# Patient Record
Sex: FEMALE | Race: WHITE | Employment: UNEMPLOYED | URBAN - METROPOLITAN AREA
[De-identification: names, ages, dates, MRNs, and addresses within clinical notes are randomized per-mention and may not be internally consistent; named-entity substitution may affect disease eponyms.]

---

## 2018-05-24 ENCOUNTER — APPOINTMENT (OUTPATIENT)
Dept: ULTRASOUND IMAGING | Age: 48
End: 2018-05-24
Payer: COMMERCIAL

## 2018-05-24 ENCOUNTER — HOSPITAL ENCOUNTER (EMERGENCY)
Age: 48
Discharge: HOME | End: 2018-05-24
Attending: FAMILY MEDICINE
Payer: COMMERCIAL

## 2018-05-24 VITALS
OXYGEN SATURATION: 96 % | TEMPERATURE: 98.4 F | RESPIRATION RATE: 16 BRPM | DIASTOLIC BLOOD PRESSURE: 78 MMHG | SYSTOLIC BLOOD PRESSURE: 124 MMHG | WEIGHT: 144 LBS | BODY MASS INDEX: 28.27 KG/M2 | HEIGHT: 60 IN | HEART RATE: 94 BPM

## 2018-05-24 DIAGNOSIS — M71.21 BAKER'S CYST OF KNEE, RIGHT: Primary | ICD-10-CM

## 2018-05-24 PROCEDURE — 99283 EMERGENCY DEPT VISIT LOW MDM: CPT

## 2018-05-24 PROCEDURE — 76882 US LMTD JT/FCL EVL NVASC XTR: CPT

## 2018-08-07 ENCOUNTER — HOSPITAL ENCOUNTER (OUTPATIENT)
Age: 48
Discharge: HOME | End: 2018-08-09
Payer: COMMERCIAL

## 2018-08-07 ENCOUNTER — HOSPITAL ENCOUNTER (OUTPATIENT)
Dept: MRI IMAGING | Age: 48
Discharge: HOME | End: 2018-08-09
Payer: COMMERCIAL

## 2018-08-07 DIAGNOSIS — M25.561 PAIN AND SWELLING OF RIGHT KNEE: ICD-10-CM

## 2018-08-07 DIAGNOSIS — M25.461 PAIN AND SWELLING OF RIGHT KNEE: ICD-10-CM

## 2018-08-07 PROCEDURE — 73721 MRI JNT OF LWR EXTRE W/O DYE: CPT

## 2019-04-09 ENCOUNTER — HOSPITAL ENCOUNTER (OUTPATIENT)
Dept: MRI IMAGING | Age: 49
Discharge: HOME | End: 2019-04-11
Payer: COMMERCIAL

## 2019-04-09 DIAGNOSIS — M54.40 CHRONIC LOW BACK PAIN WITH SCIATICA, SCIATICA LATERALITY UNSPECIFIED, UNSPECIFIED BACK PAIN LATERALITY: ICD-10-CM

## 2019-04-09 DIAGNOSIS — G89.29 CHRONIC LOW BACK PAIN WITH SCIATICA, SCIATICA LATERALITY UNSPECIFIED, UNSPECIFIED BACK PAIN LATERALITY: ICD-10-CM

## 2019-04-09 PROCEDURE — 72148 MRI LUMBAR SPINE W/O DYE: CPT

## 2019-05-04 ENCOUNTER — APPOINTMENT (OUTPATIENT)
Dept: GENERAL RADIOLOGY | Age: 49
End: 2019-05-04
Payer: COMMERCIAL

## 2019-05-04 ENCOUNTER — HOSPITAL ENCOUNTER (EMERGENCY)
Age: 49
Discharge: HOME | End: 2019-05-04
Attending: FAMILY MEDICINE
Payer: COMMERCIAL

## 2019-05-04 VITALS
SYSTOLIC BLOOD PRESSURE: 137 MMHG | DIASTOLIC BLOOD PRESSURE: 80 MMHG | RESPIRATION RATE: 20 BRPM | WEIGHT: 142 LBS | OXYGEN SATURATION: 94 % | HEIGHT: 60 IN | HEART RATE: 93 BPM | BODY MASS INDEX: 27.88 KG/M2 | TEMPERATURE: 98.6 F

## 2019-05-04 DIAGNOSIS — J44.1 COPD EXACERBATION (HCC): Primary | ICD-10-CM

## 2019-05-04 PROCEDURE — 71045 X-RAY EXAM CHEST 1 VIEW: CPT

## 2019-05-04 PROCEDURE — 6370000000 HC RX 637 (ALT 250 FOR IP): Performed by: FAMILY MEDICINE

## 2019-05-04 PROCEDURE — 99284 EMERGENCY DEPT VISIT MOD MDM: CPT

## 2019-05-04 RX ORDER — PREDNISONE 20 MG/1
TABLET ORAL
Qty: 10 TABLET | Refills: 0 | Status: SHIPPED | OUTPATIENT
Start: 2019-05-04 | End: 2019-05-14

## 2019-05-04 RX ORDER — PREDNISONE 20 MG/1
60 TABLET ORAL ONCE
Status: COMPLETED | OUTPATIENT
Start: 2019-05-04 | End: 2019-05-04

## 2019-05-04 RX ORDER — IPRATROPIUM BROMIDE AND ALBUTEROL SULFATE 2.5; .5 MG/3ML; MG/3ML
1 SOLUTION RESPIRATORY (INHALATION) ONCE
Status: COMPLETED | OUTPATIENT
Start: 2019-05-04 | End: 2019-05-04

## 2019-05-04 RX ORDER — AZITHROMYCIN 250 MG/1
TABLET, FILM COATED ORAL
Qty: 1 PACKET | Refills: 0 | Status: SHIPPED | OUTPATIENT
Start: 2019-05-04 | End: 2019-12-26

## 2019-05-04 RX ADMIN — IPRATROPIUM BROMIDE AND ALBUTEROL SULFATE 1 AMPULE: .5; 3 SOLUTION RESPIRATORY (INHALATION) at 09:22

## 2019-05-04 RX ADMIN — PREDNISONE 60 MG: 20 TABLET ORAL at 09:22

## 2019-05-04 RX ADMIN — IPRATROPIUM BROMIDE AND ALBUTEROL SULFATE 1 AMPULE: .5; 3 SOLUTION RESPIRATORY (INHALATION) at 09:45

## 2019-05-04 NOTE — ED NOTES
Patient states \"I already took 10 mg prednisone this morning, is it okay if I take 60 mg now? \" Dr Lonnie Horowitz notified and ok for patient to take 60 mg     Jasper Lozano RN  05/04/19 1015

## 2019-05-04 NOTE — ED NOTES
Patient's pulse ox 94%- ok with Dr Dina Steiner for patient to be discharged      Dk Gresham RN  05/04/19 1929

## 2019-05-04 NOTE — ED NOTES
Patient given discharge paperwork at this time. Patient also given scripts. Patient has no further questions at this time. Nurse provided education to patient. Patient is alert and oriented and VS are stable at this time.         Link TESSIE Dorantes  05/04/19 4450

## 2019-05-04 NOTE — ED PROVIDER NOTES
Department of Emergency Medicine   ED  Provider Note  Admit Date/RoomTime: 5/4/2019  9:10 AM  ED Room: 05/05  Chief Complaint:   Shortness of Breath (pt presents with SOB for the past week- patient has a history of COPD- has been taking breathing treatments and inhalers at home, also started on prednisone 6 days ago- patient states \"this morning i coughed up a lot of green phlegm\")    History of Present Illness   Source of history provided by:  patient. History/Exam Limitations: none. Clifford Delgadillo is a 51 y.o. old female who  has a past medical history of Anxiety, Asthma, Bipolar disorder ([facility] [address] Chronic back pain, COPD, Fibrocystic breast changes, GERD (gastroesophageal reflux disease), Herpes zoster ophthalmicus, Irritable bowel syndrome, Obesity, Psoriasis, Psoriatic arthropathy ([address] and Tinea versicolor. presents to the emergency department by private vehicle and ambulatory, with complaints of gradual onset, still present shortness of breath which began 1 week(s) prior to arrival.  She has PMH significant for COPD. The symptoms are associated with productive green sputum today and denies any symptoms of: fever, chills, night sweats, nasal congestion, chest pain or hemoptysis. Since onset the symptoms have been worsening. The symptoms are aggravated by exertion and relieved by rest and sitting up. ROS    Pertinent positives and negatives are stated within HPI, all other systems reviewed and are negative. Past Surgical History:   Procedure Laterality Date    CHOLECYSTECTOMY  11/2003    HERNIA REPAIR  11/2003    LAPAROSCOPY      LEEP      LYMPH NODE BIOPSY     Social History:  reports that she has been smoking cigarettes. She has a 13.00 pack-year smoking history. She has never used smokeless tobacco. She reports that she does not drink alcohol or use drugs.   Family History: family history includes Arthritis in her father and sister; Cancer (age of onset: 40) in her mother; Diabetes in ipratropium-albuterol (DUONEB) nebulizer solution 1 ampule (1 ampule Inhalation Given 5/4/19 0945)        Re-examination:  5/4/19       Time: 10:15   Improving feels better     Consult(s):   None    Procedure(s):   none    MDM:   Patient has significant COPD however she is not hypoxic at this point she is not tachypneic no retractions feels better after treatment does have nebulizer at home will start on Zithromax increase prednisone advise follow-up with PMD return when necessary or to the hospital if symptoms worsen. Counseling: The emergency provider has spoken with the patient and discussed todays results, in addition to providing specific details for the plan of care and counseling regarding the diagnosis and prognosis. Questions are answered at this time and they are agreeable with the plan. Assessment      1. COPD exacerbation ([address]      Plan   Discharge to home  Patient condition is good    New Medications     New Prescriptions    AZITHROMYCIN (ZITHROMAX Z-RIRI) 250 MG TABLET    TAKE 500MG PO DAY ONE. .. 250MG PO DAY TWO THROUGH FIVE   DISPENSE 6 TABS  NO REFILLS    PREDNISONE (DELTASONE) 20 MG TABLET    Start Sunday AM Two tablets qd     Electronically signed by Bill Mott MD   DD: 5/4/19  **This report was transcribed using voice recognition software. Every effort was made to ensure accuracy; however, inadvertent computerized transcription errors may be present.   END OF ED PROVIDER NOTE        Bill Mott MD  05/04/19 0337

## 2019-08-22 ENCOUNTER — HOSPITAL ENCOUNTER (OUTPATIENT)
Dept: GENERAL RADIOLOGY | Age: 49
Discharge: HOME | End: 2019-08-24
Payer: COMMERCIAL

## 2019-08-22 ENCOUNTER — HOSPITAL ENCOUNTER (OUTPATIENT)
Age: 49
Discharge: HOME | End: 2019-08-24
Payer: COMMERCIAL

## 2019-08-22 DIAGNOSIS — R52 PAIN: ICD-10-CM

## 2019-08-22 PROCEDURE — 73564 X-RAY EXAM KNEE 4 OR MORE: CPT

## 2019-12-26 ENCOUNTER — APPOINTMENT (OUTPATIENT)
Dept: GENERAL RADIOLOGY | Age: 49
DRG: 190 | End: 2019-12-26
Attending: INTERNAL MEDICINE
Payer: COMMERCIAL

## 2019-12-26 ENCOUNTER — HOSPITAL ENCOUNTER (EMERGENCY)
Age: 49
Discharge: ANOTHER HOSPITAL | End: 2019-12-26
Attending: FAMILY MEDICINE
Payer: COMMERCIAL

## 2019-12-26 ENCOUNTER — APPOINTMENT (OUTPATIENT)
Dept: GENERAL RADIOLOGY | Age: 49
End: 2019-12-26
Payer: COMMERCIAL

## 2019-12-26 ENCOUNTER — HOSPITAL ENCOUNTER (INPATIENT)
Age: 49
LOS: 4 days | Discharge: HOME | DRG: 190 | End: 2019-12-31
Attending: INTERNAL MEDICINE | Admitting: INTERNAL MEDICINE
Payer: COMMERCIAL

## 2019-12-26 VITALS
DIASTOLIC BLOOD PRESSURE: 76 MMHG | WEIGHT: 140 LBS | OXYGEN SATURATION: 96 % | TEMPERATURE: 98.6 F | RESPIRATION RATE: 26 BRPM | SYSTOLIC BLOOD PRESSURE: 115 MMHG | HEIGHT: 60 IN | HEART RATE: 118 BPM | BODY MASS INDEX: 27.48 KG/M2

## 2019-12-26 DIAGNOSIS — J96.01 ACUTE RESPIRATORY FAILURE WITH HYPOXIA (HCC): Primary | ICD-10-CM

## 2019-12-26 PROBLEM — J44.1 COPD EXACERBATION (HCC): Status: ACTIVE | Noted: 2019-12-26

## 2019-12-26 LAB
ALBUMIN SERPL-MCNC: 4.1 G/DL (ref 3.5–5.2)
ALP BLD-CCNC: 64 U/L (ref 35–104)
ALT SERPL-CCNC: 17 U/L (ref 0–32)
ANION GAP SERPL CALCULATED.3IONS-SCNC: 16 MMOL/L (ref 7–16)
ARTERIAL PH AT TEMPERATURE: 7.42 (ref 7.35–7.45)
AST SERPL-CCNC: 18 U/L (ref 0–31)
BASE EXCESS: -0.7 MMOL/L (ref -3–3)
BASOPHILS ABSOLUTE: 0.03 E9/L (ref 0–0.2)
BASOPHILS RELATIVE PERCENT: 0.2 % (ref 0–2)
BILIRUB SERPL-MCNC: 0.4 MG/DL (ref 0–1.2)
BUN BLDV-MCNC: 11 MG/DL (ref 6–20)
CALCIUM SERPL-MCNC: 9.4 MG/DL (ref 8.6–10.2)
CHLORIDE BLD-SCNC: 94 MMOL/L (ref 98–107)
CO2: 25 MMOL/L (ref 22–29)
CREAT SERPL-MCNC: 0.6 MG/DL (ref 0.5–1)
D DIMER: 322 NG/ML DDU
DRAWN BY: NORMAL
EOSINOPHILS ABSOLUTE: 0 E9/L (ref 0.05–0.5)
EOSINOPHILS RELATIVE PERCENT: 0 % (ref 0–6)
GFR AFRICAN AMERICAN: >60
GFR NON-AFRICAN AMERICAN: >60 ML/MIN/1.73
GLUCOSE BLD-MCNC: 138 MG/DL (ref 74–99)
HCO3: 23.5 MMOL/L (ref 22–26)
HCT VFR BLD CALC: 41.4 % (ref 34–48)
HEMOGLOBIN: 13.6 G/DL (ref 11.5–15.5)
IMMATURE GRANULOCYTES #: 0.04 E9/L
IMMATURE GRANULOCYTES %: 0.3 % (ref 0–5)
INFLUENZA A BY PCR: NOT DETECTED
INFLUENZA B BY PCR: NOT DETECTED
INSPIRED O2: 2 %
LYMPHOCYTES ABSOLUTE: 1.05 E9/L (ref 1.5–4)
LYMPHOCYTES RELATIVE PERCENT: 8.4 % (ref 20–42)
MCH RBC QN AUTO: 31.7 PG (ref 26–35)
MCHC RBC AUTO-ENTMCNC: 32.9 % (ref 32–34.5)
MCV RBC AUTO: 96.5 FL (ref 80–99.9)
MODE: NORMAL
MONOCYTES ABSOLUTE: 0.78 E9/L (ref 0.1–0.95)
MONOCYTES RELATIVE PERCENT: 6.2 % (ref 2–12)
NEUTROPHILS ABSOLUTE: 10.66 E9/L (ref 1.8–7.3)
NEUTROPHILS RELATIVE PERCENT: 84.9 % (ref 43–80)
O2 SATURATION: 95.7 % (ref 95–100)
PCO2: 36.5 MMHG (ref 35–45)
PDW BLD-RTO: 13.1 FL (ref 11.5–15)
PLATELET # BLD: 215 E9/L (ref 130–450)
PMV BLD AUTO: 9.3 FL (ref 7–12)
PO2: 78.1 MMHG (ref 60–80)
POTASSIUM SERPL-SCNC: 3.9 MMOL/L (ref 3.5–5)
RBC # BLD: 4.29 E12/L (ref 3.5–5.5)
SAMPLE SITE: NORMAL
SODIUM BLD-SCNC: 135 MMOL/L (ref 132–146)
SPECIMEN SOURCE: NORMAL
TOTAL PROTEIN: 7.7 G/DL (ref 6.4–8.3)
WBC # BLD: 12.6 E9/L (ref 4.5–11.5)

## 2019-12-26 PROCEDURE — 99285 EMERGENCY DEPT VISIT HI MDM: CPT

## 2019-12-26 PROCEDURE — 0100U HC RESPIRPTHGN MULT REV TRANS & AMP PRB TECH 21 TRGT: CPT

## 2019-12-26 PROCEDURE — 2700000000 HC OXYGEN THERAPY PER DAY

## 2019-12-26 PROCEDURE — 93005 ELECTROCARDIOGRAM TRACING: CPT | Performed by: FAMILY MEDICINE

## 2019-12-26 PROCEDURE — G0378 HOSPITAL OBSERVATION PER HR: HCPCS

## 2019-12-26 PROCEDURE — 2580000003 HC RX 258: Performed by: INTERNAL MEDICINE

## 2019-12-26 PROCEDURE — 6370000000 HC RX 637 (ALT 250 FOR IP)

## 2019-12-26 PROCEDURE — 71045 X-RAY EXAM CHEST 1 VIEW: CPT

## 2019-12-26 PROCEDURE — 84145 PROCALCITONIN (PCT): CPT

## 2019-12-26 PROCEDURE — 85025 COMPLETE CBC W/AUTO DIFF WBC: CPT

## 2019-12-26 PROCEDURE — 87040 BLOOD CULTURE FOR BACTERIA: CPT

## 2019-12-26 PROCEDURE — 96374 THER/PROPH/DIAG INJ IV PUSH: CPT

## 2019-12-26 PROCEDURE — 99221 1ST HOSP IP/OBS SF/LOW 40: CPT | Performed by: INTERNAL MEDICINE

## 2019-12-26 PROCEDURE — 86140 C-REACTIVE PROTEIN: CPT

## 2019-12-26 PROCEDURE — 6360000002 HC RX W HCPCS: Performed by: FAMILY MEDICINE

## 2019-12-26 PROCEDURE — 87502 INFLUENZA DNA AMP PROBE: CPT

## 2019-12-26 PROCEDURE — G0379 DIRECT REFER HOSPITAL OBSERV: HCPCS

## 2019-12-26 PROCEDURE — 36415 COLL VENOUS BLD VENIPUNCTURE: CPT

## 2019-12-26 PROCEDURE — 6360000002 HC RX W HCPCS

## 2019-12-26 PROCEDURE — 6360000002 HC RX W HCPCS: Performed by: INTERNAL MEDICINE

## 2019-12-26 PROCEDURE — 80053 COMPREHEN METABOLIC PANEL: CPT

## 2019-12-26 PROCEDURE — 6370000000 HC RX 637 (ALT 250 FOR IP): Performed by: INTERNAL MEDICINE

## 2019-12-26 PROCEDURE — 94640 AIRWAY INHALATION TREATMENT: CPT

## 2019-12-26 PROCEDURE — 84484 ASSAY OF TROPONIN QUANT: CPT

## 2019-12-26 PROCEDURE — 2580000003 HC RX 258: Performed by: FAMILY MEDICINE

## 2019-12-26 PROCEDURE — 83605 ASSAY OF LACTIC ACID: CPT

## 2019-12-26 PROCEDURE — 82803 BLOOD GASES ANY COMBINATION: CPT

## 2019-12-26 PROCEDURE — 85378 FIBRIN DEGRADE SEMIQUANT: CPT

## 2019-12-26 RX ORDER — IPRATROPIUM BROMIDE AND ALBUTEROL SULFATE 2.5; .5 MG/3ML; MG/3ML
1 SOLUTION RESPIRATORY (INHALATION)
Status: DISCONTINUED | OUTPATIENT
Start: 2019-12-27 | End: 2020-01-01 | Stop reason: HOSPADM

## 2019-12-26 RX ORDER — MAGNESIUM SULFATE IN WATER 40 MG/ML
INJECTION, SOLUTION INTRAVENOUS
Status: COMPLETED
Start: 2019-12-26 | End: 2019-12-26

## 2019-12-26 RX ORDER — ONDANSETRON 2 MG/ML
4 INJECTION INTRAMUSCULAR; INTRAVENOUS EVERY 6 HOURS PRN
Status: DISCONTINUED | OUTPATIENT
Start: 2019-12-26 | End: 2020-01-01 | Stop reason: HOSPADM

## 2019-12-26 RX ORDER — METHYLPREDNISOLONE SODIUM SUCCINATE 40 MG/ML
40 INJECTION, POWDER, LYOPHILIZED, FOR SOLUTION INTRAMUSCULAR; INTRAVENOUS EVERY 8 HOURS
Status: DISCONTINUED | OUTPATIENT
Start: 2019-12-26 | End: 2019-12-30

## 2019-12-26 RX ORDER — POTASSIUM CHLORIDE 20 MEQ/1
40 TABLET, EXTENDED RELEASE ORAL PRN
Status: DISCONTINUED | OUTPATIENT
Start: 2019-12-26 | End: 2020-01-01 | Stop reason: HOSPADM

## 2019-12-26 RX ORDER — SODIUM CHLORIDE 0.9 % (FLUSH) 0.9 %
10 SYRINGE (ML) INJECTION EVERY 12 HOURS SCHEDULED
Status: DISCONTINUED | OUTPATIENT
Start: 2019-12-26 | End: 2020-01-01 | Stop reason: HOSPADM

## 2019-12-26 RX ORDER — MAGNESIUM SULFATE 1 G/100ML
1 INJECTION INTRAVENOUS PRN
Status: DISCONTINUED | OUTPATIENT
Start: 2019-12-26 | End: 2020-01-01 | Stop reason: HOSPADM

## 2019-12-26 RX ORDER — AZITHROMYCIN 250 MG/1
250 TABLET, FILM COATED ORAL DAILY
Status: COMPLETED | OUTPATIENT
Start: 2019-12-28 | End: 2019-12-31

## 2019-12-26 RX ORDER — METHYLPREDNISOLONE SODIUM SUCCINATE 125 MG/2ML
125 INJECTION, POWDER, LYOPHILIZED, FOR SOLUTION INTRAMUSCULAR; INTRAVENOUS ONCE
Status: COMPLETED | OUTPATIENT
Start: 2019-12-26 | End: 2019-12-26

## 2019-12-26 RX ORDER — GUAIFENESIN AND DEXTROMETHORPHAN HYDROBROMIDE 1200; 60 MG/1; MG/1
TABLET, EXTENDED RELEASE ORAL
COMMUNITY
End: 2021-04-21

## 2019-12-26 RX ORDER — POTASSIUM CHLORIDE 7.45 MG/ML
10 INJECTION INTRAVENOUS PRN
Status: DISCONTINUED | OUTPATIENT
Start: 2019-12-26 | End: 2020-01-01 | Stop reason: HOSPADM

## 2019-12-26 RX ORDER — SODIUM CHLORIDE 0.9 % (FLUSH) 0.9 %
10 SYRINGE (ML) INJECTION PRN
Status: DISCONTINUED | OUTPATIENT
Start: 2019-12-26 | End: 2020-01-01 | Stop reason: HOSPADM

## 2019-12-26 RX ORDER — IPRATROPIUM BROMIDE AND ALBUTEROL SULFATE 2.5; .5 MG/3ML; MG/3ML
SOLUTION RESPIRATORY (INHALATION)
Status: COMPLETED
Start: 2019-12-26 | End: 2019-12-26

## 2019-12-26 RX ORDER — AZITHROMYCIN 250 MG/1
500 TABLET, FILM COATED ORAL DAILY
Status: COMPLETED | OUTPATIENT
Start: 2019-12-27 | End: 2019-12-27

## 2019-12-26 RX ORDER — 0.9 % SODIUM CHLORIDE 0.9 %
1000 INTRAVENOUS SOLUTION INTRAVENOUS ONCE
Status: COMPLETED | OUTPATIENT
Start: 2019-12-26 | End: 2019-12-26

## 2019-12-26 RX ORDER — IPRATROPIUM BROMIDE AND ALBUTEROL SULFATE 2.5; .5 MG/3ML; MG/3ML
1 SOLUTION RESPIRATORY (INHALATION) ONCE
Status: DISCONTINUED | OUTPATIENT
Start: 2019-12-26 | End: 2019-12-26

## 2019-12-26 RX ORDER — ACETAMINOPHEN 325 MG/1
650 TABLET ORAL EVERY 4 HOURS PRN
Status: DISCONTINUED | OUTPATIENT
Start: 2019-12-26 | End: 2020-01-01 | Stop reason: HOSPADM

## 2019-12-26 RX ORDER — MAGNESIUM SULFATE HEPTAHYDRATE 500 MG/ML
2 INJECTION, SOLUTION INTRAMUSCULAR; INTRAVENOUS ONCE
Status: DISCONTINUED | OUTPATIENT
Start: 2019-12-26 | End: 2019-12-26 | Stop reason: HOSPADM

## 2019-12-26 RX ADMIN — SODIUM CHLORIDE, PRESERVATIVE FREE 10 ML: 5 INJECTION INTRAVENOUS at 23:07

## 2019-12-26 RX ADMIN — IPRATROPIUM BROMIDE AND ALBUTEROL SULFATE 3 ML: 2.5; .5 SOLUTION RESPIRATORY (INHALATION) at 16:10

## 2019-12-26 RX ADMIN — SODIUM CHLORIDE 1000 ML: 9 INJECTION, SOLUTION INTRAVENOUS at 16:58

## 2019-12-26 RX ADMIN — MOMETASONE FUROATE AND FORMOTEROL FUMARATE DIHYDRATE 2 PUFF: 200; 5 AEROSOL RESPIRATORY (INHALATION) at 22:44

## 2019-12-26 RX ADMIN — METHYLPREDNISOLONE SODIUM SUCCINATE 125 MG: 125 INJECTION, POWDER, FOR SOLUTION INTRAMUSCULAR; INTRAVENOUS at 16:55

## 2019-12-26 RX ADMIN — MAGNESIUM SULFATE HEPTAHYDRATE 2 G: 40 INJECTION, SOLUTION INTRAVENOUS at 16:58

## 2019-12-26 RX ADMIN — METHYLPREDNISOLONE SODIUM SUCCINATE 40 MG: 40 INJECTION, POWDER, LYOPHILIZED, FOR SOLUTION INTRAMUSCULAR; INTRAVENOUS at 23:07

## 2019-12-26 ASSESSMENT — PAIN SCALES - GENERAL: PAINLEVEL_OUTOF10: 4

## 2019-12-26 ASSESSMENT — PAIN DESCRIPTION - ORIENTATION: ORIENTATION: LOWER

## 2019-12-26 ASSESSMENT — PAIN DESCRIPTION - FREQUENCY: FREQUENCY: INTERMITTENT

## 2019-12-26 ASSESSMENT — PAIN DESCRIPTION - LOCATION: LOCATION: BACK

## 2019-12-26 ASSESSMENT — PAIN DESCRIPTION - PROGRESSION: CLINICAL_PROGRESSION: NOT CHANGED

## 2019-12-26 ASSESSMENT — PAIN DESCRIPTION - ONSET: ONSET: ON-GOING

## 2019-12-26 ASSESSMENT — PAIN - FUNCTIONAL ASSESSMENT: PAIN_FUNCTIONAL_ASSESSMENT: ACTIVITIES ARE NOT PREVENTED

## 2019-12-26 ASSESSMENT — PAIN DESCRIPTION - PAIN TYPE: TYPE: CHRONIC PAIN

## 2019-12-26 ASSESSMENT — PAIN DESCRIPTION - DESCRIPTORS: DESCRIPTORS: ACHING

## 2019-12-27 LAB
ADENOVIRUS BY PCR: NOT DETECTED
ALBUMIN SERPL-MCNC: 4.1 G/DL (ref 3.5–5.2)
ALBUMIN SERPL-MCNC: 4.2 G/DL (ref 3.5–5.2)
ALP BLD-CCNC: 61 U/L (ref 35–104)
ALP BLD-CCNC: 61 U/L (ref 35–104)
ALT SERPL-CCNC: 18 U/L (ref 0–32)
ALT SERPL-CCNC: 18 U/L (ref 0–32)
ANION GAP SERPL CALCULATED.3IONS-SCNC: 11 MMOL/L (ref 7–16)
ANION GAP SERPL CALCULATED.3IONS-SCNC: 14 MMOL/L (ref 7–16)
AST SERPL-CCNC: 19 U/L (ref 0–31)
AST SERPL-CCNC: 22 U/L (ref 0–31)
BASOPHILS ABSOLUTE: 0.01 E9/L (ref 0–0.2)
BASOPHILS ABSOLUTE: 0.02 E9/L (ref 0–0.2)
BASOPHILS RELATIVE PERCENT: 0.1 % (ref 0–2)
BASOPHILS RELATIVE PERCENT: 0.2 % (ref 0–2)
BILIRUB SERPL-MCNC: 0.3 MG/DL (ref 0–1.2)
BILIRUB SERPL-MCNC: 0.3 MG/DL (ref 0–1.2)
BILIRUBIN DIRECT: <0.2 MG/DL (ref 0–0.3)
BILIRUBIN, INDIRECT: NORMAL MG/DL (ref 0–1)
BORDETELLA PARAPERTUSSIS BY PCR: NOT DETECTED
BORDETELLA PERTUSSIS BY PCR: NOT DETECTED
BUN BLDV-MCNC: 11 MG/DL (ref 6–20)
BUN BLDV-MCNC: 9 MG/DL (ref 6–20)
C-REACTIVE PROTEIN: 12.1 MG/DL (ref 0–0.4)
CALCIUM SERPL-MCNC: 8.9 MG/DL (ref 8.6–10.2)
CALCIUM SERPL-MCNC: 9.5 MG/DL (ref 8.6–10.2)
CHLAMYDOPHILIA PNEUMONIAE BY PCR: NOT DETECTED
CHLORIDE BLD-SCNC: 101 MMOL/L (ref 98–107)
CHLORIDE BLD-SCNC: 98 MMOL/L (ref 98–107)
CO2: 23 MMOL/L (ref 22–29)
CO2: 26 MMOL/L (ref 22–29)
CORONAVIRUS 229E BY PCR: NOT DETECTED
CORONAVIRUS HKU1 BY PCR: NOT DETECTED
CORONAVIRUS NL63 BY PCR: NOT DETECTED
CORONAVIRUS OC43 BY PCR: NOT DETECTED
CREAT SERPL-MCNC: 0.4 MG/DL (ref 0.5–1)
CREAT SERPL-MCNC: 0.5 MG/DL (ref 0.5–1)
EKG ATRIAL RATE: 139 BPM
EKG P AXIS: 77 DEGREES
EKG P-R INTERVAL: 126 MS
EKG Q-T INTERVAL: 288 MS
EKG QRS DURATION: 66 MS
EKG QTC CALCULATION (BAZETT): 438 MS
EKG R AXIS: 26 DEGREES
EKG T AXIS: 62 DEGREES
EKG VENTRICULAR RATE: 139 BPM
EOSINOPHILS ABSOLUTE: 0 E9/L (ref 0.05–0.5)
EOSINOPHILS ABSOLUTE: 0 E9/L (ref 0.05–0.5)
EOSINOPHILS RELATIVE PERCENT: 0 % (ref 0–6)
EOSINOPHILS RELATIVE PERCENT: 0 % (ref 0–6)
GFR AFRICAN AMERICAN: >60
GFR AFRICAN AMERICAN: >60
GFR NON-AFRICAN AMERICAN: >60 ML/MIN/1.73
GFR NON-AFRICAN AMERICAN: >60 ML/MIN/1.73
GLUCOSE BLD-MCNC: 137 MG/DL (ref 74–99)
GLUCOSE BLD-MCNC: 161 MG/DL (ref 74–99)
HCT VFR BLD CALC: 40 % (ref 34–48)
HCT VFR BLD CALC: 40.3 % (ref 34–48)
HEMOGLOBIN: 13.2 G/DL (ref 11.5–15.5)
HEMOGLOBIN: 13.4 G/DL (ref 11.5–15.5)
HUMAN METAPNEUMOVIRUS BY PCR: NOT DETECTED
HUMAN RHINOVIRUS/ENTEROVIRUS BY PCR: NOT DETECTED
IMMATURE GRANULOCYTES #: 0.03 E9/L
IMMATURE GRANULOCYTES #: 0.05 E9/L
IMMATURE GRANULOCYTES %: 0.3 % (ref 0–5)
IMMATURE GRANULOCYTES %: 0.5 % (ref 0–5)
INFLUENZA A BY PCR: NOT DETECTED
INFLUENZA B BY PCR: NOT DETECTED
LACTIC ACID: 1 MMOL/L (ref 0.5–2.2)
LYMPHOCYTES ABSOLUTE: 0.32 E9/L (ref 1.5–4)
LYMPHOCYTES ABSOLUTE: 0.42 E9/L (ref 1.5–4)
LYMPHOCYTES RELATIVE PERCENT: 3 % (ref 20–42)
LYMPHOCYTES RELATIVE PERCENT: 4.6 % (ref 20–42)
MAGNESIUM: 2.3 MG/DL (ref 1.6–2.6)
MCH RBC QN AUTO: 31.2 PG (ref 26–35)
MCH RBC QN AUTO: 31.6 PG (ref 26–35)
MCHC RBC AUTO-ENTMCNC: 32.8 % (ref 32–34.5)
MCHC RBC AUTO-ENTMCNC: 33.5 % (ref 32–34.5)
MCV RBC AUTO: 94.3 FL (ref 80–99.9)
MCV RBC AUTO: 95.3 FL (ref 80–99.9)
MONOCYTES ABSOLUTE: 0.08 E9/L (ref 0.1–0.95)
MONOCYTES ABSOLUTE: 0.12 E9/L (ref 0.1–0.95)
MONOCYTES RELATIVE PERCENT: 0.8 % (ref 2–12)
MONOCYTES RELATIVE PERCENT: 1.3 % (ref 2–12)
MYCOPLASMA PNEUMONIAE BY PCR: NOT DETECTED
NEUTROPHILS ABSOLUTE: 10.13 E9/L (ref 1.8–7.3)
NEUTROPHILS ABSOLUTE: 8.59 E9/L (ref 1.8–7.3)
NEUTROPHILS RELATIVE PERCENT: 93.5 % (ref 43–80)
NEUTROPHILS RELATIVE PERCENT: 95.7 % (ref 43–80)
PARAINFLUENZA VIRUS 1 BY PCR: NOT DETECTED
PARAINFLUENZA VIRUS 2 BY PCR: NOT DETECTED
PARAINFLUENZA VIRUS 3 BY PCR: NOT DETECTED
PARAINFLUENZA VIRUS 4 BY PCR: NOT DETECTED
PDW BLD-RTO: 13.2 FL (ref 11.5–15)
PDW BLD-RTO: 13.2 FL (ref 11.5–15)
PLATELET # BLD: 218 E9/L (ref 130–450)
PLATELET # BLD: 229 E9/L (ref 130–450)
PMV BLD AUTO: 9.4 FL (ref 7–12)
PMV BLD AUTO: 9.7 FL (ref 7–12)
POIKILOCYTES: ABNORMAL
POLYCHROMASIA: ABNORMAL
POTASSIUM REFLEX MAGNESIUM: 4.2 MMOL/L (ref 3.5–5)
POTASSIUM SERPL-SCNC: 4 MMOL/L (ref 3.5–5)
PROCALCITONIN: 0.07 NG/ML (ref 0–0.08)
RBC # BLD: 4.23 E12/L (ref 3.5–5.5)
RBC # BLD: 4.24 E12/L (ref 3.5–5.5)
RESPIRATORY SYNCYTIAL VIRUS BY PCR: DETECTED
SODIUM BLD-SCNC: 135 MMOL/L (ref 132–146)
SODIUM BLD-SCNC: 138 MMOL/L (ref 132–146)
TEAR DROP CELLS: ABNORMAL
TOTAL PROTEIN: 7.6 G/DL (ref 6.4–8.3)
TOTAL PROTEIN: 7.6 G/DL (ref 6.4–8.3)
TROPONIN: <0.01 NG/ML (ref 0–0.03)
WBC # BLD: 10.6 E9/L (ref 4.5–11.5)
WBC # BLD: 9.2 E9/L (ref 4.5–11.5)

## 2019-12-27 PROCEDURE — 96372 THER/PROPH/DIAG INJ SC/IM: CPT

## 2019-12-27 PROCEDURE — 2700000000 HC OXYGEN THERAPY PER DAY

## 2019-12-27 PROCEDURE — 85025 COMPLETE CBC W/AUTO DIFF WBC: CPT

## 2019-12-27 PROCEDURE — 1200000000 HC SEMI PRIVATE

## 2019-12-27 PROCEDURE — 2580000003 HC RX 258: Performed by: INTERNAL MEDICINE

## 2019-12-27 PROCEDURE — 82103 ALPHA-1-ANTITRYPSIN TOTAL: CPT

## 2019-12-27 PROCEDURE — 80076 HEPATIC FUNCTION PANEL: CPT

## 2019-12-27 PROCEDURE — 36415 COLL VENOUS BLD VENIPUNCTURE: CPT

## 2019-12-27 PROCEDURE — 6370000000 HC RX 637 (ALT 250 FOR IP): Performed by: INTERNAL MEDICINE

## 2019-12-27 PROCEDURE — 83735 ASSAY OF MAGNESIUM: CPT

## 2019-12-27 PROCEDURE — 87206 SMEAR FLUORESCENT/ACID STAI: CPT

## 2019-12-27 PROCEDURE — 94640 AIRWAY INHALATION TREATMENT: CPT

## 2019-12-27 PROCEDURE — 82785 ASSAY OF IGE: CPT

## 2019-12-27 PROCEDURE — 96376 TX/PRO/DX INJ SAME DRUG ADON: CPT

## 2019-12-27 PROCEDURE — 87070 CULTURE OTHR SPECIMN AEROBIC: CPT

## 2019-12-27 PROCEDURE — 93010 ELECTROCARDIOGRAM REPORT: CPT | Performed by: INTERNAL MEDICINE

## 2019-12-27 PROCEDURE — 82104 ALPHA-1-ANTITRYPSIN PHENO: CPT

## 2019-12-27 PROCEDURE — 6360000002 HC RX W HCPCS: Performed by: INTERNAL MEDICINE

## 2019-12-27 PROCEDURE — 80048 BASIC METABOLIC PNL TOTAL CA: CPT

## 2019-12-27 RX ORDER — METOPROLOL SUCCINATE 25 MG/1
12.5 TABLET, EXTENDED RELEASE ORAL 2 TIMES DAILY
Status: DISCONTINUED | OUTPATIENT
Start: 2019-12-27 | End: 2020-01-01 | Stop reason: HOSPADM

## 2019-12-27 RX ORDER — PANTOPRAZOLE SODIUM 40 MG/1
40 TABLET, DELAYED RELEASE ORAL
Status: DISCONTINUED | OUTPATIENT
Start: 2019-12-27 | End: 2020-01-01 | Stop reason: HOSPADM

## 2019-12-27 RX ORDER — GUAIFENESIN/DEXTROMETHORPHAN 100-10MG/5
10 SYRUP ORAL EVERY 4 HOURS PRN
Status: DISCONTINUED | OUTPATIENT
Start: 2019-12-27 | End: 2020-01-01 | Stop reason: HOSPADM

## 2019-12-27 RX ADMIN — METHYLPREDNISOLONE SODIUM SUCCINATE 40 MG: 40 INJECTION, POWDER, LYOPHILIZED, FOR SOLUTION INTRAMUSCULAR; INTRAVENOUS at 14:16

## 2019-12-27 RX ADMIN — MOMETASONE FUROATE AND FORMOTEROL FUMARATE DIHYDRATE 2 PUFF: 200; 5 AEROSOL RESPIRATORY (INHALATION) at 07:52

## 2019-12-27 RX ADMIN — METOPROLOL SUCCINATE 12.5 MG: 25 TABLET, EXTENDED RELEASE ORAL at 08:29

## 2019-12-27 RX ADMIN — MOMETASONE FUROATE AND FORMOTEROL FUMARATE DIHYDRATE 2 PUFF: 200; 5 AEROSOL RESPIRATORY (INHALATION) at 20:40

## 2019-12-27 RX ADMIN — GUAIFENESIN AND DEXTROMETHORPHAN 10 ML: 100; 10 SYRUP ORAL at 08:28

## 2019-12-27 RX ADMIN — METOPROLOL SUCCINATE 12.5 MG: 25 TABLET, EXTENDED RELEASE ORAL at 01:38

## 2019-12-27 RX ADMIN — METOPROLOL SUCCINATE 12.5 MG: 25 TABLET, EXTENDED RELEASE ORAL at 22:46

## 2019-12-27 RX ADMIN — METHYLPREDNISOLONE SODIUM SUCCINATE 40 MG: 40 INJECTION, POWDER, LYOPHILIZED, FOR SOLUTION INTRAMUSCULAR; INTRAVENOUS at 22:45

## 2019-12-27 RX ADMIN — IPRATROPIUM BROMIDE AND ALBUTEROL SULFATE 1 AMPULE: .5; 3 SOLUTION RESPIRATORY (INHALATION) at 17:54

## 2019-12-27 RX ADMIN — PANTOPRAZOLE SODIUM 40 MG: 40 TABLET, DELAYED RELEASE ORAL at 06:35

## 2019-12-27 RX ADMIN — METHYLPREDNISOLONE SODIUM SUCCINATE 40 MG: 40 INJECTION, POWDER, LYOPHILIZED, FOR SOLUTION INTRAMUSCULAR; INTRAVENOUS at 06:35

## 2019-12-27 RX ADMIN — SODIUM CHLORIDE, PRESERVATIVE FREE 10 ML: 5 INJECTION INTRAVENOUS at 08:29

## 2019-12-27 RX ADMIN — AZITHROMYCIN 500 MG: 250 TABLET, FILM COATED ORAL at 08:29

## 2019-12-27 RX ADMIN — IPRATROPIUM BROMIDE AND ALBUTEROL SULFATE 1 AMPULE: .5; 3 SOLUTION RESPIRATORY (INHALATION) at 07:50

## 2019-12-27 RX ADMIN — IPRATROPIUM BROMIDE AND ALBUTEROL SULFATE 1 AMPULE: .5; 3 SOLUTION RESPIRATORY (INHALATION) at 20:40

## 2019-12-27 RX ADMIN — SODIUM CHLORIDE, PRESERVATIVE FREE 10 ML: 5 INJECTION INTRAVENOUS at 22:46

## 2019-12-27 RX ADMIN — IPRATROPIUM BROMIDE AND ALBUTEROL SULFATE 1 AMPULE: .5; 3 SOLUTION RESPIRATORY (INHALATION) at 13:44

## 2019-12-27 RX ADMIN — ENOXAPARIN SODIUM 40 MG: 40 INJECTION SUBCUTANEOUS at 08:28

## 2019-12-27 ASSESSMENT — PAIN SCALES - GENERAL
PAINLEVEL_OUTOF10: 0

## 2019-12-27 NOTE — CARE COORDINATION
Met with pt about diagnosis and discharge plan of care. Pt lives in [address] [place] [address] with , 2 sons, and grandson. She has a hx of COPD and has nebulizer at home. Has no other equipment and utilized no home care. Pt is currently on 6l/nc but we will try to wean before she would be discharged. She has no preference to [facility] but she does have [facility]. Will continue to follow-O     The Plan for Transition of Care is related to the following treatment goals: home     The Patient and/or patient representative pt was provided with a choice of provider and agrees   with the discharge plan. [x] Yes [] No    Freedom of choice list was provided with basic dialogue that supports the patient's individualized plan of care/goals, treatment preferences and shares the quality data associated with the providers.  [x] Yes [] No

## 2019-12-27 NOTE — CONSULTS
[address] NOTE    Patient: Germain Quinonez  MRN: [id number]  : 1970    Encounter Date: 2019  Encounter Time: 1:51 PM     Date of Admission: .2019  9:59 PM    Consulting Physician:  Primary Care Physician:      Claribel Dupont [address] [place] [address]     [phone]     [phone]    PROBLEM LIST:  Patient Active Problem List   Diagnosis    Inflammation and stiffening of spine ([facility] [address]    Chronic obstructive pulmonary disease ([address]    Anxiety    Chronic back pain    Psoriasis    GERD (gastroesophageal reflux disease)    Asthma    Bipolar disorder ([facility] [address]    Obesity    COPD (chronic obstructive pulmonary disease) ([facility] [address]    Hyperlipidemia    Impaired fasting glucose    Plantar wart    Plantar fasciitis    COPD exacerbation ([facility] [address]     Reason for Consultation: Acute Exacerbation of COPD    HPI:   Ms. John Steele is a 46 y/o female with past medical history noted that is known to Dr. Melissa Ramirez and [facility] that presented to [facility] with increased work of breathing found to have COPD exacerabation in context of RSV tracheobronchitis. Patient on supplemental oxygen and has active wheezes and decreased breath sounds. The cough was gradual onset from about 2 days prior to admission aggravated by smoking and were about 7/10 in severity. She admits to sick contacts from few relatives at home with similar issue.      PAST MEDICAL HISTORY:   Past Medical History:   Diagnosis Date    Anxiety     Asthma     Bipolar disorder ([facility] [address]     Chronic back pain     COPD     Fibrocystic breast changes     GERD (gastroesophageal reflux disease)     Hiatal hernia    Herpes zoster ophthalmicus     Irritable bowel syndrome     Obesity     Psoriasis     Psoriatic arthropathy ([facility] [address]     Tinea versicolor        PAST SURGICAL HISTORY:   Past Surgical History:   Procedure Laterality Date    CHOLECYSTECTOMY  2003    HERNIA REPAIR  2003    LAPAROSCOPY      LEEP      LYMPH NODE BIOPSY         FAMILY HISTORY:   Family History   Problem Relation Age of Onset   Bedelia Fruits Cancer Mother 40        Non-Hodgkin Lymphoma    Diabetes Sister     High Blood Pressure Sister     Arthritis Sister         Gout    Arthritis Father     Psoriasis Father     Diabetes Father     High Blood Pressure Father     Kidney Disease Father     Heart Disease Father        SOCIAL HISTORY:   Social History     Socioeconomic History    Marital status:      Spouse name: Not on file    Number of children: Not on file    Years of education: Not on file    Highest education level: Not on file   Occupational History    Not on file   Social Needs    Financial resource strain: Not on file    Food insecurity:     Worry: Not on file     Inability: Not on file    Transportation needs:     Medical: Not on file     Non-medical: Not on file   Tobacco Use    Smoking status: Current Every Day Smoker     Packs/day: 1.00     Years: 26.00     Pack years: 26.00     Types: Cigarettes    Smokeless tobacco: Never Used    Tobacco comment: down from 2 packs   Substance and Sexual Activity    Alcohol use: No    Drug use: No    Sexual activity: Yes   Lifestyle    Physical activity:     Days per week: Not on file     Minutes per session: Not on file    Stress: Not on file   Relationships    Social connections:     Talks on phone: Not on file     Gets together: Not on file     Attends [employer] service: Not on file     Active member of club or organization: Not on file     Attends meetings of clubs or organizations: Not on file     Relationship status: Not on file    Intimate partner violence:     Fear of current or ex partner: Not on file     Emotionally abused: Not on file     Physically abused: Not on file     Forced sexual activity: Not on file   Other Topics Concern    Not on file   Social History Narrative    Not on file     Social History     Tobacco Use   Smoking Status Current Every Day Smoker    Packs/day: 1.00    Years: 26.00    Pack years: 26.00    Types: Cigarettes   Smokeless Tobacco Never Used   Tobacco Comment    down from 2 packs     Social History     Substance and Sexual Activity   Alcohol Use No     Social History     Substance and Sexual Activity   Drug Use No     HOME MEDICATIONS:  Prior to Admission medications    Medication Sig Start Date End Date Taking? Authorizing Provider   Dextromethorphan-guaiFENesin ([address] DM MAXIMUM STRENGTH)  MG TB12 Take by mouth   Yes Historical Provider, MD   Fluticasone-Umeclidin-Vilant (TRELEGY ELLIPTA IN) Inhale 1 puff into the lungs daily   Yes Historical Provider, MD   HYDROcodone-acetaminophen (NORCO)  MG per tablet Take 1 tablet by mouth 3 times daily  . Yes Historical Provider, MD   metoprolol succinate (TOPROL XL) 25 MG extended release tablet Take 12.5 mg by mouth 2 times daily    Yes Historical Provider, MD   omeprazole (PRILOSEC) 20 MG delayed release capsule Take 40 mg by mouth daily   Yes Historical Provider, MD   albuterol (PROVENTIL HFA;VENTOLIN HFA) 108 (90 BASE) MCG/ACT inhaler Inhale 2 puffs into the lungs every 6 hours as needed. Yes Historical Provider, MD   acetaminophen (TYLENOL) 500 MG tablet Take 1,000 mg by mouth every 6 hours as needed. Indications: Do not exceed 4000 mg in one day.    Yes Historical Provider, MD       CURRENT MEDICATIONS:  Current Facility-Administered Medications: guaiFENesin-dextromethorphan (ROBITUSSIN DM) 100-10 MG/5ML syrup 10 mL, 10 mL, Oral, Q4H PRN  metoprolol succinate (TOPROL XL) extended release tablet 12.5 mg, 12.5 mg, Oral, BID  pantoprazole (PROTONIX) tablet 40 mg, 40 mg, Oral, QAM AC  sodium chloride flush 0.9 % injection 10 mL, 10 mL, Intravenous, 2 times per day  sodium chloride flush 0.9 % injection 10 mL, 10 mL, Intravenous, PRN  potassium chloride (KLOR-CON M) extended release tablet 40 mEq, 40 mEq, Oral, PRN **OR** potassium bicarb-citric acid (EFFER-K) effervescent tablet 40 mEq, 40 mEq, Oral, PRN **OR** potassium chloride 10 mEq/100 mL IVPB (Peripheral Line), 10 mEq, Intravenous, PRN  magnesium sulfate 1 g in dextrose 5% 100 mL IVPB, 1 g, Intravenous, PRN  magnesium hydroxide (MILK OF MAGNESIA) 400 MG/5ML suspension 30 mL, 30 mL, Oral, Daily PRN  ondansetron (ZOFRAN) injection 4 mg, 4 mg, Intravenous, Q6H PRN  enoxaparin (LOVENOX) injection 40 mg, 40 mg, Subcutaneous, Daily  acetaminophen (TYLENOL) tablet 650 mg, 650 mg, Oral, Q4H PRN  ipratropium-albuterol (DUONEB) nebulizer solution 1 ampule, 1 ampule, Inhalation, Q4H WA  mometasone-formoterol (DULERA) 200-5 MCG/ACT inhaler 2 puff, 2 puff, Inhalation, BID **AND** MDI Treatment, , , BID  [COMPLETED] azithromycin (ZITHROMAX) tablet 500 mg, 500 mg, Oral, Daily **FOLLOWED BY** [START ON 12/28/2019] azithromycin (ZITHROMAX) tablet 250 mg, 250 mg, Oral, Daily  methylPREDNISolone sodium (SOLU-MEDROL) injection 40 mg, 40 mg, Intravenous, Q8H    IV MEDICATIONS:      ALLERGIES:  Allergies   Allergen Reactions    Codeine Hives    Sulfa Antibiotics Anaphylaxis and Hives    Demerol Hcl [Meperidine] Nausea And Vomiting       REVIEW OF SYSTEMS:  General ROS:     - Positive For:     - Negative For: chills, fatigue, fever, malaise, night sweats or sleep disturbance   ENT ROS:      - Positive For:     - Negative For: epistaxis, headaches, sinus pain, sneezing or sore throat   Allergy and Immunology ROS:     - Negative For: nasal congestion, postnasal drip or seasonal allergies   Hematological and Lymphatic ROS:      - Negative For: bleeding problems, bruising, fatigue, night sweats or pallor   Respiratory ROS:      - Positive For:       - Negative For: hemoptysis, pleuritic type chest pains  Cardiovascular ROS:      - Positive For:      - Negative For: chest pain, dyspnea on exertion, irregular heartbeat, loss of consciousness, murmur, orthopnea or palpitations   Gastrointestinal ROS:      - Positive For:     - Negative For: abdominal pain, Date    MG 2.3 12/27/2019     Ca/Phos:   Lab Results   Component Value Date    CALCIUM 9.5 12/27/2019     Amylase: No results found for: AMYLASE  Lipase: No results found for: LIPASE  LIVER PROFILE:   Recent Labs     12/26/19  1705 12/26/19  2340 12/27/19  0513   AST 18 19 22   ALT 17 18 18   BILIDIR  --   --  <0.2   BILITOT 0.4 0.3 0.3   ALKPHOS 64 61 61       PT/INR: No results for input(s): PROTIME, INR in the last 72 hours. APTT: No results for input(s): APTT in the last 72 hours. Cardiac Enzymes:  Lab Results   Component Value Date    CKTOTAL 93 04/02/2013    CKMB 1.2 04/02/2013    TROPONINI <0.01 12/26/2019       Hgb A1C:   Lab Results   Component Value Date    LABA1C 5.4 04/23/2011     No results found for: EAG  SONNY: No results found for: SONNY  ESR:   Lab Results   Component Value Date    SEDRATE 2 04/15/2013     CRP:   Lab Results   Component Value Date    CRP 12.1 (H) 12/26/2019     D Dimer:   Lab Results   Component Value Date    DDIMER 322 12/26/2019     Folate and B12: No results found for: Donald Sue, No results found for: FOLATE    Lactic Acid:   Lab Results   Component Value Date    LACTA 1.0 12/26/2019     Ammonia:   Cortisol:  Thyroid Studies:  Lab Results   Component Value Date    TSH 1.008 02/08/2011     XR CHEST PORTABLE   Final Result      Slight atelectasis of left lung base. ASSESSMENT:  1.) Acute RSV Tracheobronchitis   2.) Acute Exacerbation of COPD  3.) Acute Hypoxic Respiratory Failure - patient was 88% on room air 12/26/2019 at 1551  4.) Left Basal Atelectasis     PLAN:  1.) IV steroids, O2, azithromycin   2.) duoneb, pulmicort, perforomist   3.) outpatient PFT  4.) A1AT with phenotype, IgE  5.) DVT Prophylaxis     Thank you Rusty Mccallum, DO very much for allowing me to see this patient in consultation and follow up. Care reviewed with nursing staff, medical and surgical specialty care, primary care and the patient's family as available.  Restraints are ordered when the patient can do harm to him/herself by pulling out devices.     Damián Raygoza M.D.

## 2019-12-27 NOTE — PLAN OF CARE
Problem: Pain:  Goal: Pain level will decrease  Description  Pain level will decrease  12/27/2019 0924 by Kelly Wharton RN  Outcome: Met This Shift  12/27/2019 0211 by Lexus Sommers RN  Outcome: Met This Shift  Goal: Control of acute pain  Description  Control of acute pain  12/27/2019 0924 by Kelly Wharton RN  Outcome: Met This Shift  12/27/2019 0211 by Lexus Sommers RN  Outcome: Met This Shift  Goal: Control of chronic pain  Description  Control of chronic pain  12/27/2019 0924 by Kelly Wharton RN  Outcome: Met This Shift  12/27/2019 0211 by Lexus Sommers RN  Outcome: Met This Shift     Problem: Breathing Pattern - Ineffective:  Goal: Ability to achieve and maintain a regular respiratory rate will improve  Description  Ability to achieve and maintain a regular respiratory rate will improve  Outcome: Met This Shift

## 2019-12-27 NOTE — H&P
Procedure Laterality Date    CHOLECYSTECTOMY  11/2003    HERNIA REPAIR  11/2003    LAPAROSCOPY      LEEP      LYMPH NODE BIOPSY         Medications Prior to Admission:    Prior to Admission medications    Medication Sig Start Date End Date Taking? Authorizing Provider   Dextromethorphan-guaiFENesin ([address] DM MAXIMUM STRENGTH)  MG TB12 Take by mouth   Yes Historical Provider, MD   Fluticasone-Umeclidin-Vilant (TRELEGY ELLIPTA IN) Inhale 1 puff into the lungs daily   Yes Historical Provider, MD   HYDROcodone-acetaminophen (NORCO)  MG per tablet Take 1 tablet by mouth 3 times daily  . Yes Historical Provider, MD   metoprolol succinate (TOPROL XL) 25 MG extended release tablet Take 12.5 mg by mouth 2 times daily    Yes Historical Provider, MD   omeprazole (PRILOSEC) 20 MG delayed release capsule Take 40 mg by mouth daily   Yes Historical Provider, MD   albuterol (PROVENTIL HFA;VENTOLIN HFA) 108 (90 BASE) MCG/ACT inhaler Inhale 2 puffs into the lungs every 6 hours as needed. Yes Historical Provider, MD   acetaminophen (TYLENOL) 500 MG tablet Take 1,000 mg by mouth every 6 hours as needed. Indications: Do not exceed 4000 mg in one day. Yes Historical Provider, MD       Allergies:    Codeine; Sulfa antibiotics; and Demerol hcl [meperidine]    Social History:    reports that she has been smoking cigarettes. She has a 26.00 pack-year smoking history. She has never used smokeless tobacco. She reports that she does not drink alcohol or use drugs. Family History:   family history includes Arthritis in her father and sister; Cancer (age of onset: 40) in her mother; Diabetes in her father and sister; Heart Disease in her father; High Blood Pressure in her father and sister; Kidney Disease in her father; Psoriasis in her father.        PHYSICAL EXAM:  Vitals:  /68   Pulse 112   Temp 98.4 °F (36.9 °C) (Oral)   Resp 18   Ht 5' (1.524 m)   Wt 140 lb (63.5 kg)   SpO2 95%   BMI 27.34 kg/m² General Appearance: AOx3 and NAD  Skin: Warm and dry  Head: Normocephalic and atraumatic, mucous membranes well hydrated   Eyes: Pupils equal, round, and reactive to light  Neck: Supple and non tender without mass   Pulmonary/Chest: + scattered wheezes, no crackle, in mild respiratory distress  Cardiovascular: sinus tachy, normal S1 and S2 and no carotid bruits  Abdomen: Soft, non-tender, non-distended, no rebound, no rigidity, + bowel sounds  Extremities: No cyanosis, no clubbing and no edema  Neurologic: No neurologic focal deficits, speech is normal, coherent     LABS:  CBC  Recent Labs     12/26/19  1705 12/26/19  2340   WBC 12.6* 10.6   HGB 13.6 13.4   HCT 41.4 40.0   MCV 96.5 94.3    218     BMP  Recent Labs     12/26/19  1705 12/26/19  2340    135   K 3.9 4.0   CL 94* 98   CO2 25 23   BUN 11 9   CREATININE 0.6 0.5   LABGLOM >60 >60   GLUCOSE 138* 161*   CALCIUM 9.4 8.9     No results found for: MG, PHOS  LFT  Recent Labs     12/26/19  1705 12/26/19  2340   ALT 17 18   AST 18 19   ALKPHOS 64 61   BILITOT 0.4 0.3     Albumin  Lab Results   Component Value Date    LABALBU 4.2 12/26/2019    LABALBU 4.1 12/26/2019    LABALBU 4.0 04/03/2013     Lactic acid   No results for input(s): LACTSEPSIS in the last 72 hours. Cardiac  Troponin   Lab Results   Component Value Date    TROPONINI 0.06 (H) 04/02/2013    TROPONINI 0.13 (H) 04/02/2013    TROPONINI 0.10 (H) 04/02/2013     Pro-BNP No results found for: PROBNP  Iron studies  No results found for: FERRITIN, IRON, TIBC    Radiology:   XR CHEST PORTABLE   Final Result      Slight atelectasis of left lung base.               ASSESSMENT and PLAN:      Problem   Copd Exacerbation ([facility])   Anxiety     Acute COPD exacerbation   · Check respiratory panel and culture   · Duoneb   · DULERA  · Solumedrol   · Azithromycin     Code Status: Full   DVT prophylaxis: Lovenox   Diet: general       PLEASE NOTE:    This report was transcribed using typing and voice recognition software. Every effort was made to ensure accuracy; however, inadvertent computerized transcription errors may be present.  More than 50 minutes have been spent in evaluating the patient, reviewing records and results, discussing with staff / family and other treating physicians.     Tereso Babinski, MD, MSc   [facility]

## 2019-12-27 NOTE — PROGRESS NOTES
General Progress Note  12/27/2019 8:42 AM  Subjective:   Admit Date: 12/26/2019  PCP: Demetris Pal DO  Interval History: assuming care of patient. She was mistakenly placed under hospitalist service. Remains SOB and anxious. Denies any chest pain. Diet: DIET GENERAL;  Pain is:None  Nausea:None  Bowel Movement/Flatus yes    Data:   Scheduled Meds:   metoprolol succinate  12.5 mg Oral BID    pantoprazole  40 mg Oral QAM AC    sodium chloride flush  10 mL Intravenous 2 times per day    enoxaparin  40 mg Subcutaneous Daily    ipratropium-albuterol  1 ampule Inhalation Q4H WA    mometasone-formoterol  2 puff Inhalation BID    [START ON 12/28/2019] azithromycin  250 mg Oral Daily    methylPREDNISolone  40 mg Intravenous Q8H     Continuous Infusions:  PRN Meds:guaiFENesin-dextromethorphan, sodium chloride flush, potassium chloride **OR** potassium alternative oral replacement **OR** potassium chloride, magnesium sulfate, magnesium hydroxide, ondansetron, acetaminophen  No intake/output data recorded. No intake/output data recorded.   No intake or output data in the 24 hours ending 12/27/19 0842    CBC with Differential:    Lab Results   Component Value Date    WBC 9.2 12/27/2019    RBC 4.23 12/27/2019    HGB 13.2 12/27/2019    HCT 40.3 12/27/2019     12/27/2019    MCV 95.3 12/27/2019    MCH 31.2 12/27/2019    MCHC 32.8 12/27/2019    RDW 13.2 12/27/2019    SEGSPCT 60 02/08/2011    LYMPHOPCT 4.6 12/27/2019    MONOPCT 1.3 12/27/2019    BASOPCT 0.1 12/27/2019    MONOSABS 0.12 12/27/2019    LYMPHSABS 0.42 12/27/2019    EOSABS 0.00 12/27/2019    BASOSABS 0.01 12/27/2019     CMP:    Lab Results   Component Value Date     12/27/2019    K 4.2 12/27/2019     12/27/2019    CO2 26 12/27/2019    BUN 11 12/27/2019    CREATININE 0.4 12/27/2019    GFRAA >60 12/27/2019    LABGLOM >60 12/27/2019    GLUCOSE 137 12/27/2019    GLUCOSE 113 02/08/2011    PROT 7.6 12/27/2019    LABALBU 4.1 12/27/2019 LABALBU 4.6 02/08/2011    CALCIUM 9.5 12/27/2019    BILITOT 0.3 12/27/2019    ALKPHOS 61 12/27/2019    AST 22 12/27/2019    ALT 18 12/27/2019     Magnesium:    Lab Results   Component Value Date    MG 2.3 12/27/2019     Phosphorus:  No results found for: PHOS  PT/INR:    Lab Results   Component Value Date    PROTIME 11.8 04/16/2013    INR 1.1 04/16/2013     Last 3 Troponin:    Lab Results   Component Value Date    TROPONINI <0.01 12/26/2019    TROPONINI 0.06 04/02/2013    TROPONINI 0.13 04/02/2013    TROPONINI 0.10 04/02/2013     U/A:    Lab Results   Component Value Date    SPECGRAV >=1.030 04/16/2013     HgBA1c:  No components found for: HGBA1C  TSH:    Lab Results   Component Value Date    TSH 1.008 02/08/2011     -----------------------------------------------------------------    Objective:   Vitals: /60   Pulse 111   Temp 97.8 °F (36.6 °C) (Oral)   Resp 16   Ht 5' (1.524 m)   Wt 139 lb 8 oz (63.3 kg)   SpO2 96%   BMI 27.24 kg/m²   General appearance: alert, appears stated age and cooperative  Skin: Skin color, texture, turgor normal. No rashes or lesions  HEENT: Head: Normal, normocephalic, atraumatic. Neck: no adenopathy, no carotid bruit, no JVD, supple, symmetrical, trachea midline and thyroid not enlarged, symmetric, no tenderness/mass/nodules  Lungs: diminished breath sounds LLL, ANDRY, RLL and RML  Heart: regular rate and rhythm, S1, S2 normal, no murmur, click, rub or gallop  Abdomen: soft, non-tender; bowel sounds normal; no masses,  no organomegaly  Extremities: extremities normal, atraumatic, no cyanosis or edema  Neurologic: Mental status: Alert, oriented, thought content appropriate    Assessment:   Principal Problem:    COPD exacerbation (HCC)  Active Problems:    Anxiety    Chronic back pain    GERD (gastroesophageal reflux disease)    Hyperlipidemia  Resolved Problems:    * No resolved hospital problems. *    Plan:   1. Will obtain Pulmonary consult with Dr. Melissa Ramirez.   2. Will

## 2019-12-28 LAB
ALBUMIN SERPL-MCNC: 4.3 G/DL (ref 3.5–5.2)
ALP BLD-CCNC: 67 U/L (ref 35–104)
ALT SERPL-CCNC: 24 U/L (ref 0–32)
ANION GAP SERPL CALCULATED.3IONS-SCNC: 13 MMOL/L (ref 7–16)
AST SERPL-CCNC: 25 U/L (ref 0–31)
BASOPHILS ABSOLUTE: 0.02 E9/L (ref 0–0.2)
BASOPHILS RELATIVE PERCENT: 0.1 % (ref 0–2)
BILIRUB SERPL-MCNC: <0.2 MG/DL (ref 0–1.2)
BILIRUBIN DIRECT: <0.2 MG/DL (ref 0–0.3)
BILIRUBIN, INDIRECT: ABNORMAL MG/DL (ref 0–1)
BUN BLDV-MCNC: 23 MG/DL (ref 6–20)
CALCIUM SERPL-MCNC: 10.2 MG/DL (ref 8.6–10.2)
CHLORIDE BLD-SCNC: 99 MMOL/L (ref 98–107)
CO2: 30 MMOL/L (ref 22–29)
CREAT SERPL-MCNC: 0.6 MG/DL (ref 0.5–1)
EOSINOPHILS ABSOLUTE: 0 E9/L (ref 0.05–0.5)
EOSINOPHILS RELATIVE PERCENT: 0 % (ref 0–6)
GFR AFRICAN AMERICAN: >60
GFR NON-AFRICAN AMERICAN: >60 ML/MIN/1.73
GLUCOSE BLD-MCNC: 179 MG/DL (ref 74–99)
HCT VFR BLD CALC: 44.1 % (ref 34–48)
HEMOGLOBIN: 14.2 G/DL (ref 11.5–15.5)
IMMATURE GRANULOCYTES #: 0.07 E9/L
IMMATURE GRANULOCYTES %: 0.5 % (ref 0–5)
LYMPHOCYTES ABSOLUTE: 0.77 E9/L (ref 1.5–4)
LYMPHOCYTES RELATIVE PERCENT: 5.4 % (ref 20–42)
MAGNESIUM: 2.1 MG/DL (ref 1.6–2.6)
MCH RBC QN AUTO: 31.2 PG (ref 26–35)
MCHC RBC AUTO-ENTMCNC: 32.2 % (ref 32–34.5)
MCV RBC AUTO: 96.9 FL (ref 80–99.9)
MONOCYTES ABSOLUTE: 0.48 E9/L (ref 0.1–0.95)
MONOCYTES RELATIVE PERCENT: 3.3 % (ref 2–12)
NEUTROPHILS ABSOLUTE: 13.04 E9/L (ref 1.8–7.3)
NEUTROPHILS RELATIVE PERCENT: 90.7 % (ref 43–80)
PDW BLD-RTO: 13.1 FL (ref 11.5–15)
PLATELET # BLD: 261 E9/L (ref 130–450)
PMV BLD AUTO: 9.7 FL (ref 7–12)
POTASSIUM REFLEX MAGNESIUM: 4.1 MMOL/L (ref 3.5–5)
RBC # BLD: 4.55 E12/L (ref 3.5–5.5)
SODIUM BLD-SCNC: 142 MMOL/L (ref 132–146)
TOTAL PROTEIN: 8.4 G/DL (ref 6.4–8.3)
WBC # BLD: 14.4 E9/L (ref 4.5–11.5)

## 2019-12-28 PROCEDURE — 2700000000 HC OXYGEN THERAPY PER DAY

## 2019-12-28 PROCEDURE — 85025 COMPLETE CBC W/AUTO DIFF WBC: CPT

## 2019-12-28 PROCEDURE — 94640 AIRWAY INHALATION TREATMENT: CPT

## 2019-12-28 PROCEDURE — 80076 HEPATIC FUNCTION PANEL: CPT

## 2019-12-28 PROCEDURE — 6370000000 HC RX 637 (ALT 250 FOR IP): Performed by: INTERNAL MEDICINE

## 2019-12-28 PROCEDURE — 1200000000 HC SEMI PRIVATE

## 2019-12-28 PROCEDURE — 36415 COLL VENOUS BLD VENIPUNCTURE: CPT

## 2019-12-28 PROCEDURE — 6360000002 HC RX W HCPCS: Performed by: INTERNAL MEDICINE

## 2019-12-28 PROCEDURE — 2580000003 HC RX 258: Performed by: INTERNAL MEDICINE

## 2019-12-28 PROCEDURE — 80048 BASIC METABOLIC PNL TOTAL CA: CPT

## 2019-12-28 PROCEDURE — 83735 ASSAY OF MAGNESIUM: CPT

## 2019-12-28 RX ADMIN — METOPROLOL SUCCINATE 12.5 MG: 25 TABLET, EXTENDED RELEASE ORAL at 09:56

## 2019-12-28 RX ADMIN — METHYLPREDNISOLONE SODIUM SUCCINATE 40 MG: 40 INJECTION, POWDER, LYOPHILIZED, FOR SOLUTION INTRAMUSCULAR; INTRAVENOUS at 15:19

## 2019-12-28 RX ADMIN — SODIUM CHLORIDE, PRESERVATIVE FREE 10 ML: 5 INJECTION INTRAVENOUS at 20:12

## 2019-12-28 RX ADMIN — MOMETASONE FUROATE AND FORMOTEROL FUMARATE DIHYDRATE 2 PUFF: 200; 5 AEROSOL RESPIRATORY (INHALATION) at 20:54

## 2019-12-28 RX ADMIN — METHYLPREDNISOLONE SODIUM SUCCINATE 40 MG: 40 INJECTION, POWDER, LYOPHILIZED, FOR SOLUTION INTRAMUSCULAR; INTRAVENOUS at 23:12

## 2019-12-28 RX ADMIN — METHYLPREDNISOLONE SODIUM SUCCINATE 40 MG: 40 INJECTION, POWDER, LYOPHILIZED, FOR SOLUTION INTRAMUSCULAR; INTRAVENOUS at 06:14

## 2019-12-28 RX ADMIN — MOMETASONE FUROATE AND FORMOTEROL FUMARATE DIHYDRATE 2 PUFF: 200; 5 AEROSOL RESPIRATORY (INHALATION) at 08:28

## 2019-12-28 RX ADMIN — METOPROLOL SUCCINATE 12.5 MG: 25 TABLET, EXTENDED RELEASE ORAL at 20:11

## 2019-12-28 RX ADMIN — AZITHROMYCIN 250 MG: 250 TABLET, FILM COATED ORAL at 09:56

## 2019-12-28 RX ADMIN — SODIUM CHLORIDE, PRESERVATIVE FREE 10 ML: 5 INJECTION INTRAVENOUS at 15:20

## 2019-12-28 RX ADMIN — IPRATROPIUM BROMIDE AND ALBUTEROL SULFATE 1 AMPULE: .5; 3 SOLUTION RESPIRATORY (INHALATION) at 16:35

## 2019-12-28 RX ADMIN — SODIUM CHLORIDE, PRESERVATIVE FREE 10 ML: 5 INJECTION INTRAVENOUS at 09:57

## 2019-12-28 RX ADMIN — IPRATROPIUM BROMIDE AND ALBUTEROL SULFATE 1 AMPULE: .5; 3 SOLUTION RESPIRATORY (INHALATION) at 08:28

## 2019-12-28 RX ADMIN — IPRATROPIUM BROMIDE AND ALBUTEROL SULFATE 1 AMPULE: .5; 3 SOLUTION RESPIRATORY (INHALATION) at 20:42

## 2019-12-28 RX ADMIN — GUAIFENESIN AND DEXTROMETHORPHAN 10 ML: 100; 10 SYRUP ORAL at 15:20

## 2019-12-28 RX ADMIN — PANTOPRAZOLE SODIUM 40 MG: 40 TABLET, DELAYED RELEASE ORAL at 06:14

## 2019-12-28 RX ADMIN — IPRATROPIUM BROMIDE AND ALBUTEROL SULFATE 1 AMPULE: .5; 3 SOLUTION RESPIRATORY (INHALATION) at 11:48

## 2019-12-28 RX ADMIN — ENOXAPARIN SODIUM 40 MG: 40 INJECTION SUBCUTANEOUS at 09:56

## 2019-12-28 ASSESSMENT — PAIN SCALES - GENERAL
PAINLEVEL_OUTOF10: 0
PAINLEVEL_OUTOF10: 0

## 2019-12-28 NOTE — PROGRESS NOTES
sneezing or sore throat   Respiratory ROS: Negative For: hemoptysis, pleuritic type chest pains  Cardiovascular ROS: Negative For: chest pain, dyspnea on exertion, irregular heartbeat, loss of consciousness, murmur, orthopnea or palpitations   Gastrointestinal ROS: Negative For: abdominal pain, appetite loss, blood in stools, change in bowel habits, change in stools, constipation, diarrhea, hematemesis, melena, nausea/vomiting or stool incontinence     OBJECTIVE:  PHYSICAL EXAMINATION:     VITAL SIGNS:  /74   Pulse 99   Temp 98.4 °F (36.9 °C) (Oral)   Resp 18   Ht 5' (1.524 m)   Wt 139 lb 8 oz (63.3 kg)   SpO2 95%   BMI 27.24 kg/m²   Wt Readings from Last 3 Encounters:   19 139 lb 8 oz (63.3 kg)   19 140 lb (63.5 kg)   19 142 lb (64.4 kg)     Temp Readings from Last 3 Encounters:   19 98.4 °F (36.9 °C) (Oral)   19 98.6 °F (37 °C) (Oral)   19 98.6 °F (37 °C) (Oral)     TMAX:  BP Readings from Last 3 Encounters:   19 125/74   19 115/76   19 137/80     Pulse Readings from Last 3 Encounters:   19 99   19 118   19 93       CURRENT PULSE OXIMETRY: SpO2: 95 %  24HR PULSE OXIMETRY RANGE: SpO2  Av.5 %  Min: 93 %  Max: 96 %  CVP:      ________________________________________________________________________    VENTILATOR SETTINGS (if applicable): Additional Respiratory  Assessments  Pulse: 99  Resp: 18  SpO2: 95 %  ETCO2:  Peak Inspiratory Pressure:  End-Inspiratory Plateau Pressure:    ABG:  Recent Labs     19  1731   PO2 78.1   PCO2 36.5   HCO3 23.5   BE -0.7   O2SAT 95.7           ________________________________________________________________________    IV ACCESS:    NUTRITION: DIET GENERAL;    INTAKE/OUTPUTS:  I/O last 3 completed shifts:   In: 480 [P.O.:480]  Out: -     Intake/Output Summary (Last 24 hours) at 2019 1354  Last data filed at 2019 1038  Gross per 24 hour   Intake 770 ml   Output 800 ml Lab Results   Component Value Date    MG 2.1 12/28/2019     Ca/Phos:   Lab Results   Component Value Date    CALCIUM 10.2 12/28/2019     Amylase: No results found for: AMYLASE  Lipase: No results found for: LIPASE  LIVER PROFILE:   Recent Labs     12/26/19  2340 12/27/19  0513 12/28/19  1045   AST 19 22 25   ALT 18 18 24   BILIDIR  --  <0.2 <0.2   BILITOT 0.3 0.3 <0.2   ALKPHOS 61 61 67       PT/INR: No results for input(s): PROTIME, INR in the last 72 hours. APTT: No results for input(s): APTT in the last 72 hours. Cardiac Enzymes:  Lab Results   Component Value Date    CKTOTAL 93 04/02/2013    CKMB 1.2 04/02/2013    TROPONINI <0.01 12/26/2019       Hgb A1C:   Lab Results   Component Value Date    LABA1C 5.4 04/23/2011     No results found for: EAG  SONNY: No results found for: SONNY  ESR:   Lab Results   Component Value Date    SEDRATE 2 04/15/2013     CRP:   Lab Results   Component Value Date    CRP 12.1 (H) 12/26/2019     D Dimer:   Lab Results   Component Value Date    DDIMER 322 12/26/2019     Folate and B12: No results found for: Kvng Ornelas, No results found for: FOLATE    Lactic Acid:   Lab Results   Component Value Date    LACTA 1.0 12/26/2019     Ammonia:   Cortisol:  Thyroid Studies:  Lab Results   Component Value Date    TSH 1.008 02/08/2011     XR CHEST PORTABLE   Final Result      Slight atelectasis of left lung base.               ASSESSMENT:  1.) Acute RSV Tracheobronchitis   2.) Acute Exacerbation of COPD  3.) Acute Hypoxic Respiratory Failure - patient was 88% on room air 12/26/2019 at 1551  4.) Left Basal Atelectasis      PLAN:  1.) IV steroids to remain at every 8 hours at this time, O2, azithromycin   2.) duoneb, pulmicort, perforomist   3.) outpatient PFT  4.) await A1AT with phenotype, IgE  5.) DVT Prophylaxis      Thank you Estefanía Koenig, DO very much for allowing me to see this patient in consultation and follow up.     Care reviewed with nursing staff, medical and surgical specialty care, primary care and the patient's family as available. Restraints are ordered when the patient can do harm to him/herself by pulling out devices.     Arely Moore M.D.

## 2019-12-28 NOTE — PROGRESS NOTES
Report called to 5W. Patient notified of inpatient bed assignment 502. Patient will notify family. Transport arranged.

## 2019-12-28 NOTE — PROGRESS NOTES
Admit Date: 12/26/2019      Subjective:     Patient: no acute issues reported overnight  Medication side effects: none    Scheduled Meds:   metoprolol succinate  12.5 mg Oral BID    pantoprazole  40 mg Oral QAM AC    sodium chloride flush  10 mL Intravenous 2 times per day    enoxaparin  40 mg Subcutaneous Daily    ipratropium-albuterol  1 ampule Inhalation Q4H WA    mometasone-formoterol  2 puff Inhalation BID    azithromycin  250 mg Oral Daily    methylPREDNISolone  40 mg Intravenous Q8H     Continuous Infusions:  PRN Meds:guaiFENesin-dextromethorphan, sodium chloride flush, potassium chloride **OR** potassium alternative oral replacement **OR** potassium chloride, magnesium sulfate, magnesium hydroxide, ondansetron, acetaminophen    Objective:   I/O last 3 completed shifts: In: 480 [P.O.:480]  Out: -   No intake/output data recorded.     BP (!) 148/86   Pulse 116   Temp 97.7 °F (36.5 °C) (Oral)   Resp 18   Ht 5' (1.524 m)   Wt 139 lb 8 oz (63.3 kg)   SpO2 96%   BMI 27.24 kg/m²   General appearance: alert, appears stated age and cooperative  Skin:negative  Heent:negative  Lungs: diminished breath sounds bibasilar  Heart: regular rate and rhythm, S1, S2 normal, no murmur, click, rub or gallop  Abdomen: soft, non-tender; bowel sounds normal; no masses,  no organomegaly  Extremities:no edema  Neurologic: Grossly normal    Labs:  CBC with Differential:    Lab Results   Component Value Date    WBC 9.2 12/27/2019    RBC 4.23 12/27/2019    HGB 13.2 12/27/2019    HCT 40.3 12/27/2019     12/27/2019    MCV 95.3 12/27/2019    MCH 31.2 12/27/2019    MCHC 32.8 12/27/2019    RDW 13.2 12/27/2019    SEGSPCT 60 02/08/2011    LYMPHOPCT 4.6 12/27/2019    MONOPCT 1.3 12/27/2019    BASOPCT 0.1 12/27/2019    MONOSABS 0.12 12/27/2019    LYMPHSABS 0.42 12/27/2019    EOSABS 0.00 12/27/2019    BASOSABS 0.01 12/27/2019     BMP:    Lab Results   Component Value Date     12/27/2019    K 4.2 12/27/2019

## 2019-12-29 LAB
ALBUMIN SERPL-MCNC: 3.9 G/DL (ref 3.5–5.2)
ALP BLD-CCNC: 59 U/L (ref 35–104)
ALT SERPL-CCNC: 42 U/L (ref 0–32)
ANION GAP SERPL CALCULATED.3IONS-SCNC: 10 MMOL/L (ref 7–16)
AST SERPL-CCNC: 36 U/L (ref 0–31)
BASOPHILS ABSOLUTE: 0 E9/L (ref 0–0.2)
BASOPHILS RELATIVE PERCENT: 0 % (ref 0–2)
BILIRUB SERPL-MCNC: <0.2 MG/DL (ref 0–1.2)
BILIRUBIN DIRECT: <0.2 MG/DL (ref 0–0.3)
BILIRUBIN, INDIRECT: ABNORMAL MG/DL (ref 0–1)
BUN BLDV-MCNC: 21 MG/DL (ref 6–20)
CALCIUM SERPL-MCNC: 9.9 MG/DL (ref 8.6–10.2)
CHLORIDE BLD-SCNC: 100 MMOL/L (ref 98–107)
CO2: 32 MMOL/L (ref 22–29)
CREAT SERPL-MCNC: 0.5 MG/DL (ref 0.5–1)
CULTURE, RESPIRATORY: NORMAL
EOSINOPHILS ABSOLUTE: 0 E9/L (ref 0.05–0.5)
EOSINOPHILS RELATIVE PERCENT: 0 % (ref 0–6)
GFR AFRICAN AMERICAN: >60
GFR NON-AFRICAN AMERICAN: >60 ML/MIN/1.73
GLUCOSE BLD-MCNC: 139 MG/DL (ref 74–99)
HCT VFR BLD CALC: 42.6 % (ref 34–48)
HEMOGLOBIN: 13.4 G/DL (ref 11.5–15.5)
LYMPHOCYTES ABSOLUTE: 0.7 E9/L (ref 1.5–4)
LYMPHOCYTES RELATIVE PERCENT: 9.6 % (ref 20–42)
MAGNESIUM: 2.1 MG/DL (ref 1.6–2.6)
MCH RBC QN AUTO: 30.9 PG (ref 26–35)
MCHC RBC AUTO-ENTMCNC: 31.5 % (ref 32–34.5)
MCV RBC AUTO: 98.2 FL (ref 80–99.9)
MONOCYTES ABSOLUTE: 0.28 E9/L (ref 0.1–0.95)
MONOCYTES RELATIVE PERCENT: 3.5 % (ref 2–12)
NEUTROPHILS ABSOLUTE: 6.09 E9/L (ref 1.8–7.3)
NEUTROPHILS RELATIVE PERCENT: 86.9 % (ref 43–80)
NUCLEATED RED BLOOD CELLS: 0 /100 WBC
PDW BLD-RTO: 13.2 FL (ref 11.5–15)
PLATELET # BLD: 258 E9/L (ref 130–450)
PMV BLD AUTO: 10 FL (ref 7–12)
POLYCHROMASIA: ABNORMAL
POTASSIUM REFLEX MAGNESIUM: 4.9 MMOL/L (ref 3.5–5)
RBC # BLD: 4.34 E12/L (ref 3.5–5.5)
SMEAR, RESPIRATORY: NORMAL
SODIUM BLD-SCNC: 142 MMOL/L (ref 132–146)
TOTAL PROTEIN: 7.3 G/DL (ref 6.4–8.3)
WBC # BLD: 7 E9/L (ref 4.5–11.5)

## 2019-12-29 PROCEDURE — 2580000003 HC RX 258: Performed by: INTERNAL MEDICINE

## 2019-12-29 PROCEDURE — 85025 COMPLETE CBC W/AUTO DIFF WBC: CPT

## 2019-12-29 PROCEDURE — 6360000002 HC RX W HCPCS: Performed by: INTERNAL MEDICINE

## 2019-12-29 PROCEDURE — 6370000000 HC RX 637 (ALT 250 FOR IP): Performed by: INTERNAL MEDICINE

## 2019-12-29 PROCEDURE — 80048 BASIC METABOLIC PNL TOTAL CA: CPT

## 2019-12-29 PROCEDURE — 94640 AIRWAY INHALATION TREATMENT: CPT

## 2019-12-29 PROCEDURE — 80076 HEPATIC FUNCTION PANEL: CPT

## 2019-12-29 PROCEDURE — 83735 ASSAY OF MAGNESIUM: CPT

## 2019-12-29 PROCEDURE — 2700000000 HC OXYGEN THERAPY PER DAY

## 2019-12-29 PROCEDURE — 36415 COLL VENOUS BLD VENIPUNCTURE: CPT

## 2019-12-29 PROCEDURE — 1200000000 HC SEMI PRIVATE

## 2019-12-29 RX ADMIN — IPRATROPIUM BROMIDE AND ALBUTEROL SULFATE 1 AMPULE: .5; 3 SOLUTION RESPIRATORY (INHALATION) at 09:00

## 2019-12-29 RX ADMIN — METOPROLOL SUCCINATE 12.5 MG: 25 TABLET, EXTENDED RELEASE ORAL at 09:18

## 2019-12-29 RX ADMIN — METHYLPREDNISOLONE SODIUM SUCCINATE 40 MG: 40 INJECTION, POWDER, LYOPHILIZED, FOR SOLUTION INTRAMUSCULAR; INTRAVENOUS at 06:45

## 2019-12-29 RX ADMIN — METHYLPREDNISOLONE SODIUM SUCCINATE 40 MG: 40 INJECTION, POWDER, LYOPHILIZED, FOR SOLUTION INTRAMUSCULAR; INTRAVENOUS at 13:47

## 2019-12-29 RX ADMIN — METOPROLOL SUCCINATE 12.5 MG: 25 TABLET, EXTENDED RELEASE ORAL at 20:58

## 2019-12-29 RX ADMIN — METHYLPREDNISOLONE SODIUM SUCCINATE 40 MG: 40 INJECTION, POWDER, LYOPHILIZED, FOR SOLUTION INTRAMUSCULAR; INTRAVENOUS at 23:15

## 2019-12-29 RX ADMIN — ENOXAPARIN SODIUM 40 MG: 40 INJECTION SUBCUTANEOUS at 09:18

## 2019-12-29 RX ADMIN — SODIUM CHLORIDE, PRESERVATIVE FREE 10 ML: 5 INJECTION INTRAVENOUS at 20:58

## 2019-12-29 RX ADMIN — PANTOPRAZOLE SODIUM 40 MG: 40 TABLET, DELAYED RELEASE ORAL at 06:45

## 2019-12-29 RX ADMIN — SODIUM CHLORIDE, PRESERVATIVE FREE 10 ML: 5 INJECTION INTRAVENOUS at 13:47

## 2019-12-29 RX ADMIN — MOMETASONE FUROATE AND FORMOTEROL FUMARATE DIHYDRATE 2 PUFF: 200; 5 AEROSOL RESPIRATORY (INHALATION) at 09:00

## 2019-12-29 RX ADMIN — IPRATROPIUM BROMIDE AND ALBUTEROL SULFATE 1 AMPULE: .5; 3 SOLUTION RESPIRATORY (INHALATION) at 19:35

## 2019-12-29 RX ADMIN — SODIUM CHLORIDE, PRESERVATIVE FREE 10 ML: 5 INJECTION INTRAVENOUS at 09:17

## 2019-12-29 RX ADMIN — MOMETASONE FUROATE AND FORMOTEROL FUMARATE DIHYDRATE 2 PUFF: 200; 5 AEROSOL RESPIRATORY (INHALATION) at 19:44

## 2019-12-29 RX ADMIN — IPRATROPIUM BROMIDE AND ALBUTEROL SULFATE 1 AMPULE: .5; 3 SOLUTION RESPIRATORY (INHALATION) at 16:40

## 2019-12-29 RX ADMIN — AZITHROMYCIN 250 MG: 250 TABLET, FILM COATED ORAL at 09:18

## 2019-12-29 RX ADMIN — IPRATROPIUM BROMIDE AND ALBUTEROL SULFATE 1 AMPULE: .5; 3 SOLUTION RESPIRATORY (INHALATION) at 12:18

## 2019-12-29 ASSESSMENT — PAIN SCALES - GENERAL: PAINLEVEL_OUTOF10: 0

## 2019-12-29 NOTE — PROGRESS NOTES
Admit Date: 12/26/2019      Subjective:     Patient: no acute issues reported overnight  Medication side effects: none    Scheduled Meds:   metoprolol succinate  12.5 mg Oral BID    pantoprazole  40 mg Oral QAM AC    sodium chloride flush  10 mL Intravenous 2 times per day    enoxaparin  40 mg Subcutaneous Daily    ipratropium-albuterol  1 ampule Inhalation Q4H WA    mometasone-formoterol  2 puff Inhalation BID    azithromycin  250 mg Oral Daily    methylPREDNISolone  40 mg Intravenous Q8H     Continuous Infusions:  PRN Meds:guaiFENesin-dextromethorphan, sodium chloride flush, potassium chloride **OR** potassium alternative oral replacement **OR** potassium chloride, magnesium sulfate, magnesium hydroxide, ondansetron, acetaminophen    Objective:   I/O last 3 completed shifts: In: 480 [P.O.:460; I.V.:20]  Out: 800 [Urine:800]  No intake/output data recorded.     BP (!) 141/72   Pulse 88   Temp 98.9 °F (37.2 °C) (Oral)   Resp 18   Ht 5' (1.524 m)   Wt 135 lb 6.4 oz (61.4 kg)   SpO2 95%   BMI 26.44 kg/m²   General appearance: alert, appears stated age and cooperative  Skin:negative  Heent:negative  Lungs: diminished breath sounds bibasilar  Heart: regular rate and rhythm, S1, S2 normal, no murmur, click, rub or gallop  Abdomen: soft, non-tender; bowel sounds normal; no masses,  no organomegaly  Extremities:no edema  Neurologic: Grossly normal    Labs:  CBC with Differential:    Lab Results   Component Value Date    WBC 7.0 12/29/2019    RBC 4.34 12/29/2019    HGB 13.4 12/29/2019    HCT 42.6 12/29/2019     12/29/2019    MCV 98.2 12/29/2019    MCH 30.9 12/29/2019    MCHC 31.5 12/29/2019    RDW 13.2 12/29/2019    NRBC 0.0 12/29/2019    SEGSPCT 60 02/08/2011    LYMPHOPCT 9.6 12/29/2019    MONOPCT 3.5 12/29/2019    BASOPCT 0.0 12/29/2019    MONOSABS 0.28 12/29/2019    LYMPHSABS 0.70 12/29/2019    EOSABS 0.00 12/29/2019    BASOSABS 0.00 12/29/2019     BMP:    Lab Results   Component Value Date    NA

## 2019-12-30 PROBLEM — J20.5 RSV BRONCHITIS: Status: ACTIVE | Noted: 2019-12-30

## 2019-12-30 LAB
ALPHA-1 ANTITRYPSIN PHENOTYPE: ABNORMAL
ALPHA-1 ANTITRYPSIN: 252 MG/DL (ref 90–200)
IGE: 151 KU/L

## 2019-12-30 PROCEDURE — 6370000000 HC RX 637 (ALT 250 FOR IP): Performed by: INTERNAL MEDICINE

## 2019-12-30 PROCEDURE — 6360000002 HC RX W HCPCS: Performed by: CLINICAL NURSE SPECIALIST

## 2019-12-30 PROCEDURE — 2700000000 HC OXYGEN THERAPY PER DAY

## 2019-12-30 PROCEDURE — 6360000002 HC RX W HCPCS: Performed by: INTERNAL MEDICINE

## 2019-12-30 PROCEDURE — 94640 AIRWAY INHALATION TREATMENT: CPT

## 2019-12-30 PROCEDURE — 1200000000 HC SEMI PRIVATE

## 2019-12-30 PROCEDURE — 2580000003 HC RX 258: Performed by: INTERNAL MEDICINE

## 2019-12-30 RX ORDER — METHYLPREDNISOLONE SODIUM SUCCINATE 40 MG/ML
40 INJECTION, POWDER, LYOPHILIZED, FOR SOLUTION INTRAMUSCULAR; INTRAVENOUS EVERY 12 HOURS
Status: DISCONTINUED | OUTPATIENT
Start: 2019-12-30 | End: 2019-12-31

## 2019-12-30 RX ADMIN — SODIUM CHLORIDE, PRESERVATIVE FREE 10 ML: 5 INJECTION INTRAVENOUS at 09:00

## 2019-12-30 RX ADMIN — AZITHROMYCIN 250 MG: 250 TABLET, FILM COATED ORAL at 09:52

## 2019-12-30 RX ADMIN — METOPROLOL SUCCINATE 12.5 MG: 25 TABLET, EXTENDED RELEASE ORAL at 21:21

## 2019-12-30 RX ADMIN — MOMETASONE FUROATE AND FORMOTEROL FUMARATE DIHYDRATE 2 PUFF: 200; 5 AEROSOL RESPIRATORY (INHALATION) at 07:32

## 2019-12-30 RX ADMIN — SODIUM CHLORIDE, PRESERVATIVE FREE 10 ML: 5 INJECTION INTRAVENOUS at 21:19

## 2019-12-30 RX ADMIN — IPRATROPIUM BROMIDE AND ALBUTEROL SULFATE 1 AMPULE: .5; 3 SOLUTION RESPIRATORY (INHALATION) at 15:35

## 2019-12-30 RX ADMIN — ENOXAPARIN SODIUM 40 MG: 40 INJECTION SUBCUTANEOUS at 09:52

## 2019-12-30 RX ADMIN — METOPROLOL SUCCINATE 12.5 MG: 25 TABLET, EXTENDED RELEASE ORAL at 09:52

## 2019-12-30 RX ADMIN — IPRATROPIUM BROMIDE AND ALBUTEROL SULFATE 1 AMPULE: .5; 3 SOLUTION RESPIRATORY (INHALATION) at 19:29

## 2019-12-30 RX ADMIN — IPRATROPIUM BROMIDE AND ALBUTEROL SULFATE 1 AMPULE: .5; 3 SOLUTION RESPIRATORY (INHALATION) at 07:32

## 2019-12-30 RX ADMIN — METHYLPREDNISOLONE SODIUM SUCCINATE 40 MG: 40 INJECTION, POWDER, FOR SOLUTION INTRAMUSCULAR; INTRAVENOUS at 19:27

## 2019-12-30 RX ADMIN — PANTOPRAZOLE SODIUM 40 MG: 40 TABLET, DELAYED RELEASE ORAL at 06:41

## 2019-12-30 RX ADMIN — METHYLPREDNISOLONE SODIUM SUCCINATE 40 MG: 40 INJECTION, POWDER, LYOPHILIZED, FOR SOLUTION INTRAMUSCULAR; INTRAVENOUS at 06:41

## 2019-12-30 RX ADMIN — IPRATROPIUM BROMIDE AND ALBUTEROL SULFATE 1 AMPULE: .5; 3 SOLUTION RESPIRATORY (INHALATION) at 11:17

## 2019-12-30 RX ADMIN — MOMETASONE FUROATE AND FORMOTEROL FUMARATE DIHYDRATE 2 PUFF: 200; 5 AEROSOL RESPIRATORY (INHALATION) at 19:29

## 2019-12-30 ASSESSMENT — PAIN SCALES - GENERAL: PAINLEVEL_OUTOF10: 0

## 2019-12-30 NOTE — PROGRESS NOTES
General Progress Note  12/30/2019 7:20 AM  Subjective:   Admit Date: 12/26/2019  PCP: Sebastian Pal DO  Interval History: no acute issues overnight. Events of weekend noted. Appreciate Pulmonary input on patient. Diet: DIET GENERAL;  Pain is:None  Nausea:None  Bowel Movement/Flatus yes    Data:   Scheduled Meds:   metoprolol succinate  12.5 mg Oral BID    pantoprazole  40 mg Oral QAM AC    sodium chloride flush  10 mL Intravenous 2 times per day    enoxaparin  40 mg Subcutaneous Daily    ipratropium-albuterol  1 ampule Inhalation Q4H WA    mometasone-formoterol  2 puff Inhalation BID    azithromycin  250 mg Oral Daily    methylPREDNISolone  40 mg Intravenous Q8H     Continuous Infusions:  PRN Meds:guaiFENesin-dextromethorphan, sodium chloride flush, potassium chloride **OR** potassium alternative oral replacement **OR** potassium chloride, magnesium sulfate, magnesium hydroxide, ondansetron, acetaminophen  I/O last 3 completed shifts: In: 1080 [P.O.:1080]  Out: -   No intake/output data recorded.     Intake/Output Summary (Last 24 hours) at 12/30/2019 0720  Last data filed at 12/29/2019 1730  Gross per 24 hour   Intake 1080 ml   Output --   Net 1080 ml       CBC with Differential:    Lab Results   Component Value Date    WBC 7.0 12/29/2019    RBC 4.34 12/29/2019    HGB 13.4 12/29/2019    HCT 42.6 12/29/2019     12/29/2019    MCV 98.2 12/29/2019    MCH 30.9 12/29/2019    MCHC 31.5 12/29/2019    RDW 13.2 12/29/2019    NRBC 0.0 12/29/2019    SEGSPCT 60 02/08/2011    LYMPHOPCT 9.6 12/29/2019    MONOPCT 3.5 12/29/2019    BASOPCT 0.0 12/29/2019    MONOSABS 0.28 12/29/2019    LYMPHSABS 0.70 12/29/2019    EOSABS 0.00 12/29/2019    BASOSABS 0.00 12/29/2019     CMP:    Lab Results   Component Value Date     12/29/2019    K 4.9 12/29/2019     12/29/2019    CO2 32 12/29/2019    BUN 21 12/29/2019    CREATININE 0.5 12/29/2019    GFRAA >60 12/29/2019    LABGLOM >60 12/29/2019    GLUCOSE 139 12/29/2019    GLUCOSE 113 02/08/2011    PROT 7.3 12/29/2019    LABALBU 3.9 12/29/2019    LABALBU 4.6 02/08/2011    CALCIUM 9.9 12/29/2019    BILITOT <0.2 12/29/2019    ALKPHOS 59 12/29/2019    AST 36 12/29/2019    ALT 42 12/29/2019     Magnesium:    Lab Results   Component Value Date    MG 2.1 12/29/2019     Phosphorus:  No results found for: PHOS  PT/INR:    Lab Results   Component Value Date    PROTIME 11.8 04/16/2013    INR 1.1 04/16/2013     Last 3 Troponin:    Lab Results   Component Value Date    TROPONINI <0.01 12/26/2019    TROPONINI 0.06 04/02/2013    TROPONINI 0.13 04/02/2013    TROPONINI 0.10 04/02/2013     U/A:    Lab Results   Component Value Date    SPECGRAV >=1.030 04/16/2013     HgBA1c:  No components found for: HGBA1C  TSH:    Lab Results   Component Value Date    TSH 1.008 02/08/2011     -----------------------------------------------------------------    Objective:   Vitals: BP (!) 140/76   Pulse 95   Temp 97.6 °F (36.4 °C) (Oral)   Resp 18   Ht 5' (1.524 m)   Wt 137 lb 4.8 oz (62.3 kg)   SpO2 95%   BMI 26.81 kg/m²   General appearance: alert, appears stated age and cooperative  Skin: Skin color, texture, turgor normal. No rashes or lesions  HEENT: Head: Normal, normocephalic, atraumatic.   Neck: no adenopathy, no carotid bruit, no JVD, supple, symmetrical, trachea midline and thyroid not enlarged, symmetric, no tenderness/mass/nodules  Lungs: diminished breath sounds bibasilar and bilaterally  Heart: regular rate and rhythm, S1, S2 normal, no murmur, click, rub or gallop  Abdomen: soft, non-tender; bowel sounds normal; no masses,  no organomegaly  Extremities: extremities normal, atraumatic, no cyanosis or edema  Neurologic: Mental status: Alert, oriented, thought content appropriate    Assessment:   Principal Problem:    COPD exacerbation (HCC)  Active Problems:    Anxiety    Chronic back pain    GERD (gastroesophageal reflux disease)    Hyperlipidemia    RSV bronchitis  Resolved Problems:    * No resolved hospital problems. *    Plan:   1. Continue IV steroids, and aerosols per Pulmonary. 2. Will follow.     Lyle Dhaliwal D.O.  7:20 AM  12/30/2019

## 2019-12-30 NOTE — PROGRESS NOTES
Pulse ox was 91% on room air at rest.  Ambulated patient on room air. Oxygen saturation was 85% on room air while ambulating. Oxygen applied. Recovery pulse ox was 90% on 1 liters of oxygen while ambulating.

## 2019-12-30 NOTE — PROGRESS NOTES
O2 Sat Latest Ref Range: 95.0 - 100.0 % 95.7   Drawn By Anne Rivera   Sample Site Unknown Brachial  Punct   Mode Unknown Nasal Cannula       Films:  Xr Chest Portable    Result Date: 12/26/2019  Clinical indications: Shortness of breath. TECHNIQUE: Single frontal projection of the chest (1 view) 2210 hours. COMPARISON: December 26, 2019 1649 hours. FINDINGS: Slight atelectasis of the left lung base. Acromioclavicular arthropathy. The heart, lungs, mediastinum and regional skeleton are otherwise unremarkable. Slight atelectasis of left lung base.        Assessment:  1.) Acute RSV Tracheobronchitis   2.) Acute Exacerbation of COPD secondary to #1  3.) Acute Hypoxic Respiratory Failure - patient was 88% on room air 12/26/2019 at 1551  4.) Left Basal Atelectasis       Plan:  · Keep POX 90-95%, Currently on 1L O2, baseline is room air  · Continue nebs and duoneb  · Continue dulera   · Patient takes trelegy at home, return to trelegy at discharge  · Continue azithromycin to completion  · Decrease IV steroids to q12h  · Will need outpatient P[facility]  · Awaiting A1AT, IgE was negative  · Need ambulatory POX today to assess for home O2 prior to discharge         Electronically signed by YANI Monk on 12/30/2019 at 10:38 AM

## 2019-12-31 VITALS
HEART RATE: 98 BPM | WEIGHT: 136 LBS | RESPIRATION RATE: 18 BRPM | HEIGHT: 60 IN | TEMPERATURE: 98.3 F | DIASTOLIC BLOOD PRESSURE: 67 MMHG | SYSTOLIC BLOOD PRESSURE: 140 MMHG | OXYGEN SATURATION: 92 % | BODY MASS INDEX: 26.7 KG/M2

## 2019-12-31 PROCEDURE — 6370000000 HC RX 637 (ALT 250 FOR IP): Performed by: INTERNAL MEDICINE

## 2019-12-31 PROCEDURE — 2700000000 HC OXYGEN THERAPY PER DAY

## 2019-12-31 PROCEDURE — 94640 AIRWAY INHALATION TREATMENT: CPT

## 2019-12-31 PROCEDURE — 6360000002 HC RX W HCPCS: Performed by: INTERNAL MEDICINE

## 2019-12-31 PROCEDURE — 2580000003 HC RX 258: Performed by: INTERNAL MEDICINE

## 2019-12-31 PROCEDURE — 6360000002 HC RX W HCPCS: Performed by: CLINICAL NURSE SPECIALIST

## 2019-12-31 RX ORDER — PREDNISONE 20 MG/1
40 TABLET ORAL DAILY
Qty: 6 TABLET | Refills: 0 | Status: SHIPPED | OUTPATIENT
Start: 2020-01-01 | End: 2020-01-04

## 2019-12-31 RX ORDER — PREDNISONE 20 MG/1
40 TABLET ORAL DAILY
Status: DISCONTINUED | OUTPATIENT
Start: 2019-12-31 | End: 2020-01-01 | Stop reason: HOSPADM

## 2019-12-31 RX ADMIN — MOMETASONE FUROATE AND FORMOTEROL FUMARATE DIHYDRATE 2 PUFF: 200; 5 AEROSOL RESPIRATORY (INHALATION) at 08:35

## 2019-12-31 RX ADMIN — SODIUM CHLORIDE, PRESERVATIVE FREE 10 ML: 5 INJECTION INTRAVENOUS at 08:49

## 2019-12-31 RX ADMIN — IPRATROPIUM BROMIDE AND ALBUTEROL SULFATE 1 AMPULE: .5; 3 SOLUTION RESPIRATORY (INHALATION) at 08:35

## 2019-12-31 RX ADMIN — AZITHROMYCIN 250 MG: 250 TABLET, FILM COATED ORAL at 08:49

## 2019-12-31 RX ADMIN — PANTOPRAZOLE SODIUM 40 MG: 40 TABLET, DELAYED RELEASE ORAL at 06:42

## 2019-12-31 RX ADMIN — IPRATROPIUM BROMIDE AND ALBUTEROL SULFATE 1 AMPULE: .5; 3 SOLUTION RESPIRATORY (INHALATION) at 12:03

## 2019-12-31 RX ADMIN — METHYLPREDNISOLONE SODIUM SUCCINATE 40 MG: 40 INJECTION, POWDER, FOR SOLUTION INTRAMUSCULAR; INTRAVENOUS at 06:42

## 2019-12-31 RX ADMIN — METOPROLOL SUCCINATE 12.5 MG: 25 TABLET, EXTENDED RELEASE ORAL at 08:49

## 2019-12-31 RX ADMIN — IPRATROPIUM BROMIDE AND ALBUTEROL SULFATE 1 AMPULE: .5; 3 SOLUTION RESPIRATORY (INHALATION) at 16:12

## 2019-12-31 RX ADMIN — ENOXAPARIN SODIUM 40 MG: 40 INJECTION SUBCUTANEOUS at 08:49

## 2019-12-31 ASSESSMENT — PAIN SCALES - GENERAL: PAINLEVEL_OUTOF10: 0

## 2019-12-31 NOTE — CARE COORDINATION
Demo, home O2 order faxed to Nathan Bhardwaj @ [facility] [phone]. Charge nurse Leo Perera notified of need for face to face by physician.  Tracy instructed to deliver portable O2 tank to room when accepted Bradley Pike

## 2019-12-31 NOTE — PROGRESS NOTES
12/29/2019    GLUCOSE 113 02/08/2011    PROT 7.3 12/29/2019    LABALBU 3.9 12/29/2019    LABALBU 4.6 02/08/2011    CALCIUM 9.9 12/29/2019    BILITOT <0.2 12/29/2019    ALKPHOS 59 12/29/2019    AST 36 12/29/2019    ALT 42 12/29/2019     Magnesium:    Lab Results   Component Value Date    MG 2.1 12/29/2019     Phosphorus:  No results found for: PHOS  PT/INR:    Lab Results   Component Value Date    PROTIME 11.8 04/16/2013    INR 1.1 04/16/2013     Last 3 Troponin:    Lab Results   Component Value Date    TROPONINI <0.01 12/26/2019    TROPONINI 0.06 04/02/2013    TROPONINI 0.13 04/02/2013    TROPONINI 0.10 04/02/2013     U/A:    Lab Results   Component Value Date    SPECGRAV >=1.030 04/16/2013     HgBA1c:  No components found for: HGBA1C  TSH:    Lab Results   Component Value Date    TSH 1.008 02/08/2011     -----------------------------------------------------------------    Objective:   Vitals: /80   Pulse 81   Temp 97.8 °F (36.6 °C) (Oral)   Resp 16   Ht 5' (1.524 m)   Wt 136 lb (61.7 kg)   SpO2 93%   BMI 26.56 kg/m²   General appearance: alert, appears stated age and cooperative  Skin: Skin color, texture, turgor normal. No rashes or lesions  HEENT: Head: Normal, normocephalic, atraumatic.   Neck: no adenopathy, no carotid bruit, no JVD, supple, symmetrical, trachea midline and thyroid not enlarged, symmetric, no tenderness/mass/nodules  Lungs: clear to auscultation bilaterally  Heart: regular rate and rhythm, S1, S2 normal, no murmur, click, rub or gallop  Abdomen: soft, non-tender; bowel sounds normal; no masses,  no organomegaly  Extremities: extremities normal, atraumatic, no cyanosis or edema  Neurologic: Mental status: Alert, oriented, thought content appropriate        Contains abnormal data Respiratory Panel, Molecular   Status:  Final result    Ref Range & Units 12/26/19 9379   Adenovirus by PCR Not Detected Not Detected    Bordetella parapertussis by PCR Not Detected Not Detected    Bordetella pertussis by PCR Not Detected Not Detected    Chlamydophilia pneumoniae by PCR Not Detected Not Detected    Coronavirus 229E by PCR Not Detected Not Detected    Coronavirus HKU1 by PCR Not Detected Not Detected    Coronavirus NL63 by PCR Not Detected Not Detected    Coronavirus OC43 by PCR Not Detected Not Detected    Human Metapneumovirus by PCR Not Detected Not Detected    Human Rhinovirus/Enterovirus by PCR Not Detected Not Detected    Influenza A by PCR Not Detected Not Detected    Influenza B by PCR Not Detected Not Detected    Mycoplasma pneumoniae by PCR Not Detected Not Detected    Parainfluenza Virus 1 by PCR Not Detected Not Detected    Parainfluenza Virus 2 by PCR Not Detected Not Detected    Parainfluenza Virus 3 by PCR Not Detected Not Detected    Parainfluenza Virus 4 by PCR Not Detected Not Detected    Respiratory Syncytial Virus by PCR Not Detected DETECTEDAbnormal     Resulting Agency  [address] [place] [address] Lab        Specimen Collected: 12/26/19 2318 Last Resulted: 12/27/19 0910             Alpha-1-antitrypsin w/ phenotype   Status:  Final result    Ref Range & Units 12/27/19 1700   A-1 Antitrypsin 90 - 200 mg/dL 252High     Comment: To convert to umol/L, multiply mg/dL by 0.185   A-1 Antitrypsin Pheno  M1M2    Comment:  The patient appears to have a normal phenotype. All M alleles   (including   subtypes M1, M2, and M3) produce normal serum concentrations of   alpha-1-protease inhibitor and are not associated with clinical   disease. Caution in interpretation is advised if the patient has been transfused   within the previous 21 days. Performed by Mo Chun , [address] [facility] [phone]   www. Abimbola Doherty MD, Lab.  Director    Resulting Agency  [facility]        Specimen Collected: 12/27/19 1700 Last Resulted: 12/30/19 1648               IgE   Status:  Final result    Ref Range & Units 12/27/19 1700   IgE <=214 kU/L 151    Comment: REFERENCE INTERVAL: Immunoglobulin E, Serum   Access complete set of age- and/or gender-specific reference intervals   for   this test in the [facility] Laboratory Test Directory ([email or web address]). Performed by [facility] [address], [address] [facility] [phone]   www. Xiomara Le MD, Lab. Director    Resulting Agency  [facility]        Specimen Collected: 12/27/19 1700 Last Resulted: 12/30/19 0935             Assessment:   Principal Problem:    COPD exacerbation ([address]  Active Problems:    Anxiety    Chronic back pain    GERD (gastroesophageal reflux disease)    Hyperlipidemia    RSV bronchitis  Resolved Problems:    * No resolved hospital problems. *    Plan:   1. Continue IV steroids and aerosols. 2. Will need ambulatory pulse ox checked for possible home oxygen. 3. Will follow.     Don Hemphill D.O.  8:14 AM  12/31/2019

## 2019-12-31 NOTE — PATIENT CARE CONFERENCE
[facility] Quality Flow/Interdisciplinary Rounds Progress Note        Quality Flow Rounds held on December 31, 2019    Disciplines Attending:  Bedside Nurse, ,  and Nursing Unit Leadership    Scott Silva was admitted on 12/26/2019  9:59 PM    Anticipated Discharge Date:  Expected Discharge Date: 12/27/19    Disposition:    Daljit Score:  Adljit Scale Score: 20    Readmission Risk              Risk of Unplanned Readmission:        12           Discussed patient goal for the day, patient clinical progression, and barriers to discharge.   The following Goal(s) of the Day/Commitment(s) have been identified:  Diagnostics - Report Results and Labs - Report Results      Akilah Monroe  December 31, 2019

## 2019-12-31 NOTE — PROGRESS NOTES
Pulmonary Progress Note  12/31/2019 2:48 PM  Subjective:   Admit Date: 12/26/2019  PCP: Toya Pal, DO  Interval History: Feels her breathing is much improved  Wants to go home    Diet: DIET GENERAL;  SOB is: Mild  Cough: Mild  Wheezing: None  chest pain: None    Data:   Scheduled Meds:    methylPREDNISolone  40 mg Intravenous Q12H    metoprolol succinate  12.5 mg Oral BID    pantoprazole  40 mg Oral QAM AC    sodium chloride flush  10 mL Intravenous 2 times per day    enoxaparin  40 mg Subcutaneous Daily    ipratropium-albuterol  1 ampule Inhalation Q4H WA    mometasone-formoterol  2 puff Inhalation BID       Continuous Infusions:     PRN Meds: guaiFENesin-dextromethorphan, sodium chloride flush, potassium chloride **OR** potassium alternative oral replacement **OR** potassium chloride, magnesium sulfate, magnesium hydroxide, ondansetron, acetaminophen    I/O last 3 completed shifts: In: 1080 [P.O.:1080]  Out: -     I/O this shift: In: 540 [P.O.:540]  Out: -       Intake/Output Summary (Last 24 hours) at 12/31/2019 1448  Last data filed at 12/31/2019 1418  Gross per 24 hour   Intake 1140 ml   Output --   Net 1140 ml       Patient Vitals for the past 96 hrs (Last 3 readings):   Weight   12/31/19 0014 136 lb (61.7 kg)   12/30/19 0241 137 lb 4.8 oz (62.3 kg)   12/29/19 0114 135 lb 6.4 oz (61.4 kg)         Recent Labs     12/29/19  0340   WBC 7.0   HGB 13.4   HCT 42.6   MCV 98.2        Recent Labs     12/29/19  0340      K 4.9      CO2 32*   BUN 21*   CREATININE 0.5     Recent Labs     12/29/19  0340   PROT 7.3   ALKPHOS 59   ALT 42*   AST 36*   BILITOT <0.2     CPK:  Lab Results   Component Value Date    CKTOTAL 93 04/02/2013        -----------------------------------------------------------------  RAD:   Results for orders placed during the hospital encounter of 12/26/19   XR CHEST PORTABLE    Narrative Clinical indications:    Shortness of breath.     TECHNIQUE:    Single frontal Hyperlipidemia     Impaired fasting glucose     Plantar wart     Plantar fasciitis     COPD exacerbation (HCC)     RSV bronchitis    Plan:   1. Yamil need oxygen when she moves around saturations go down to 83%. Oxygen has been set up and she has portable oxygen in the room   2. needs to stop smoking  3. Patient finished 5 days of azithromycin  4. Currently on 40 mg of Solu-Medrol every 12   5. change to p.o. prednisone 40 mg for 3 more days  6.  Follow-up with [facility] next week    BETITO Jerome

## 2020-01-01 LAB — BLOOD CULTURE, ROUTINE: NORMAL

## 2020-01-02 NOTE — DISCHARGE SUMMARY
Family Practice Discharge Summary    Rosa Mcginnis  :  1970  MRN:  [id number]    Admit date:  2019  Discharge date:  2019    Admitting Physician:  Rabia Randhawa MD    Discharge Diagnoses:    Patient Active Problem List   Diagnosis    [facility]ammation and stiffening of spine ([facility]    Chronic obstructive pulmonary disease ([facility]    Anxiety    Chronic back pain    Psoriasis    GERD (gastroesophageal reflux disease)    Asthma    Bipolar disorder ([facility]    Obesity    COPD (chronic obstructive pulmonary disease) ([facility]    Hyperlipidemia    Impaired fasting glucose    Plantar wart    Plantar fasciitis    COPD exacerbation ([facility]    RSV bronchitis       Admission Condition:  fair    Discharged Condition:  fair    Hospital Course:   Rosa Mcginnis is a 47 y.o. female with history of CODP ( not on O2) presented to the ER in [place]  with 2 day history of SOB, cough with feeling that she is unable to take deep breath, easily getting SMYTH. + nasal congestion, starting having dry cough and changed to productive clear sputum without blood the day of presentation. She was seen earlier at urgent care where she was found to by hypoxic low 80s. She reports 3 house hold members having cold symptoms. She is on Albuterol, and triology   Not on oxyegen   At [facility] care was 80, + cough productive   · Denied any fever, nausea, vomiting, diarrhea, abdominal pain, blood in stool or black stool. · Denied any chest pain, no recent lightheadedness or syncope        Discharge Medications:    See medication reconciliation under discharge insructions. Consults:  pulmonary/intensive care    Significant Diagnostic Studies:  Pulse oximetry in the mid to low 80's on RA.     Treatments:   steroids: prednisone and respiratory therapy: O2 and albuterol/atropine nebulizer    Discharge Exam:  BP (!) 140/67   Pulse 98   Temp 98.3 °F (36.8 °C) (Oral)   Resp 18   Ht 5' (1.524 m)   Wt 136 lb (61.7 kg)   SpO2 92%   BMI 26.56 kg/m²     General Appearance:    Alert, cooperative, no distress, appears stated age   Head:    Normocephalic, without obvious abnormality, atraumatic   Eyes:    PERRL, conjunctiva/corneas clear, EOM's intact, fundi     benign, both eyes   Ears:    Normal TM's and external ear canals, both ears   Nose:   Nares normal, septum midline, mucosa normal, no drainage    or sinus tenderness   Throat:   Lips, mucosa, and tongue normal; teeth and gums normal   Neck:   Supple, symmetrical, trachea midline, no adenopathy;     thyroid:  no enlargement/tenderness/nodules; no carotid    bruit or JVD   Back:     Symmetric, no curvature, ROM normal, no CVA tenderness   Lungs:     Clear to auscultation bilaterally, respirations unlabored   Chest Wall:    No tenderness or deformity    Heart:    Regular rate and rhythm, S1 and S2 normal, no murmur, rub   or gallop   Breast Exam:    No tenderness, masses, or nipple abnormality   Abdomen:     Soft, non-tender, bowel sounds active all four quadrants,     no masses, no organomegaly   Genitalia:    Normal female without lesion, discharge or tenderness   Rectal:    Normal tone ;guaiac negative stool   Extremities:   Extremities normal, atraumatic, no cyanosis or edema   Pulses:   2+ and symmetric all extremities   Skin:   Skin color, texture, turgor normal, no rashes or lesions   Lymph nodes:   Cervical, supraclavicular, and axillary nodes normal   Neurologic:   CNII-XII intact, normal strength, sensation and reflexes     throughout       Disposition:   home       Medication List      START taking these medications    predniSONE 20 MG tablet  Commonly known as:  DELTASONE  Take 2 tablets by mouth daily for 3 days        CONTINUE taking these medications    albuterol sulfate  (90 Base) MCG/ACT inhaler     HYDROcodone-acetaminophen  MG per tablet  Commonly known as:  NORCO     metoprolol succinate 25 MG extended release tablet  Commonly known as:  TOPROL XL     [facility] DM MAXIMUM STRENGTH  MG Tb12     omeprazole 20 MG delayed release capsule  Commonly known as:  PRILOSEC     TRELEGY ELLIPTA IN        STOP taking these medications    acetaminophen 500 MG tablet  Commonly known as:  TYLENOL           Where to Get Your Medications      These medications were sent to [facility], [address] [facility], [address] [place] [address]    Phone:  [phone]   · predniSONE 20 MG tablet              Signed:  Kami Osuna D.O.  1/2/2020, 9:25 AM

## 2021-01-11 ENCOUNTER — HOSPITAL ENCOUNTER (OUTPATIENT)
Dept: CT IMAGING | Age: 51
Discharge: HOME | End: 2021-01-13
Payer: COMMERCIAL

## 2021-01-11 DIAGNOSIS — J44.9 CHRONIC OBSTRUCTIVE ASTHMA (HCC): ICD-10-CM

## 2021-01-11 PROCEDURE — 71250 CT THORAX DX C-: CPT

## 2021-03-25 ENCOUNTER — APPOINTMENT (OUTPATIENT)
Dept: GENERAL RADIOLOGY | Age: 51
End: 2021-03-25
Payer: COMMERCIAL

## 2021-03-25 ENCOUNTER — HOSPITAL ENCOUNTER (EMERGENCY)
Age: 51
Discharge: HOME | End: 2021-03-25
Payer: COMMERCIAL

## 2021-03-25 VITALS
BODY MASS INDEX: 29.64 KG/M2 | SYSTOLIC BLOOD PRESSURE: 166 MMHG | HEART RATE: 99 BPM | TEMPERATURE: 97.4 F | RESPIRATION RATE: 18 BRPM | HEIGHT: 60 IN | OXYGEN SATURATION: 99 % | WEIGHT: 151 LBS | DIASTOLIC BLOOD PRESSURE: 81 MMHG

## 2021-03-25 DIAGNOSIS — S80.01XA CONTUSION OF RIGHT KNEE, INITIAL ENCOUNTER: Primary | ICD-10-CM

## 2021-03-25 PROCEDURE — 6370000000 HC RX 637 (ALT 250 FOR IP): Performed by: NURSE PRACTITIONER

## 2021-03-25 PROCEDURE — 73560 X-RAY EXAM OF KNEE 1 OR 2: CPT

## 2021-03-25 PROCEDURE — 99284 EMERGENCY DEPT VISIT MOD MDM: CPT

## 2021-03-25 RX ORDER — PREDNISONE 10 MG/1
10 TABLET ORAL DAILY
Status: ON HOLD | COMMUNITY
End: 2021-12-03 | Stop reason: HOSPADM

## 2021-03-25 RX ORDER — IPRATROPIUM BROMIDE AND ALBUTEROL SULFATE 2.5; .5 MG/3ML; MG/3ML
1 SOLUTION RESPIRATORY (INHALATION) ONCE
Status: COMPLETED | OUTPATIENT
Start: 2021-03-25 | End: 2021-03-25

## 2021-03-25 RX ORDER — LORATADINE 10 MG/1
10 TABLET, ORALLY DISINTEGRATING ORAL DAILY
COMMUNITY
End: 2021-07-07

## 2021-03-25 RX ADMIN — IPRATROPIUM BROMIDE AND ALBUTEROL SULFATE 1 AMPULE: .5; 3 SOLUTION RESPIRATORY (INHALATION) at 12:17

## 2021-03-25 ASSESSMENT — PAIN DESCRIPTION - LOCATION: LOCATION: KNEE

## 2021-03-25 NOTE — ED PROVIDER NOTES
[facility]    HPI:  3/25/21,   Time: 12:14 PM EDT         Chaka Polanco is a 45 y.o. female presenting to the ED for right knee pain, beginning earlier today ago. The complaint has been constant, moderate in severity, and worsened by nothing. Presents for complaints of right knee pain which she states began earlier today states that she was crawling on the floor cleaning and is now having pain to the right knee. Denies any known fall or direct injury. She has no visible swelling or deformity. She has had prior surgery by Dr. Adria Paige as a meniscus repair few years ago. Does have a prescription for Norco and did take a pain pill earlier today. ROS:   Pertinent positives and negatives are stated within HPI, all other systems reviewed and are negative.  --------------------------------------------- PAST HISTORY ---------------------------------------------  Past Medical History:  has a past medical history of Anxiety, Asthma, Bipolar disorder ([facility] Chronic back pain, COPD, Fibrocystic breast changes, GERD (gastroesophageal reflux disease), Herpes zoster ophthalmicus, Irritable bowel syndrome, Obesity, Psoriasis, Psoriatic arthropathy ([facility] and Tinea versicolor. Past Surgical History:  has a past surgical history that includes laparoscopy; Cholecystectomy (11/2003); hernia repair (11/2003); LEEP; and lymph node biopsy. Social History:  reports that she has been smoking cigarettes. She has a 26.00 pack-year smoking history. She has never used smokeless tobacco. She reports that she does not drink alcohol or use drugs. Family History: family history includes Arthritis in her father and sister; Cancer (age of onset: 40) in her mother; Diabetes in her father and sister; Heart Disease in her father; High Blood Pressure in her father and sister; Kidney Disease in her father; Psoriasis in her father. The patients home medications have been reviewed.     Allergies: Codeine, Sulfa antibiotics, and Demerol hcl [meperidine]    -------------------------------------------------- RESULTS -------------------------------------------------  All laboratory and radiology results have been personally reviewed by myself   LABS:  No results found for this visit on 03/25/21. RADIOLOGY:  Interpreted by Radiologist.  XR KNEE RIGHT (1-2 VIEWS)   Final Result   Severe osteoarthrosis in the medial compartment. Bony infarct in proximal tibia is unchanged. ------------------------- NURSING NOTES AND VITALS REVIEWED ---------------------------   The nursing notes within the ED encounter and vital signs as below have been reviewed. BP (!) 166/81   Pulse 99   Temp 97.4 °F (36.3 °C)   Resp 18   Ht 5' (1.524 m)   Wt 151 lb (68.5 kg)   SpO2 99%   BMI 29.49 kg/m²   Oxygen Saturation Interpretation: Normal      ---------------------------------------------------PHYSICAL EXAM--------------------------------------      Constitutional/General: Alert and oriented x3, well appearing, non toxic in NAD  Head: NC/AT  Eyes: PERRL, EOMI  Mouth: Oropharynx clear, handling secretions, no trismus  Neck: Supple, full ROM, no meningeal signs  Pulmonary: Lungs diminished to auscultation bilaterally, expiratory wheezes, no rales, or rhonchi. Not in respiratory distress  Cardiovascular:  Regular rate and rhythm, no murmurs, gallops, or rubs. 2+ distal pulses  Abdomen: Soft, non tender, non distended,   Extremities: Moves all extremities x 4. Warm and well perfused. Has full range of motion of the right knee with flexion and extension. She has tenderness on palpation to the suprapatellar region on the lateral aspect. No visible abrasion, contusion, abnormality noted. Pedal pulses are palpable 2+.     Skin: warm and dry without rash  Neurologic: GCS 15,  Psych: Normal Affect      ------------------------------ ED COURSE/MEDICAL DECISION MAKING----------------------  Medications   ipratropium-albuterol (DUONEB) nebulizer solution 1 ampule (1 ampule Inhalation Given 3/25/21 9605)         Medical Decision Making:    She was informed of negative x-ray result no evidence of acute pathology. There is evidence of some chronic osteoarthritis. Ace bandage applied she does have pain medication at home advised to follow-up with PCP or Ortho as needed. Counseling: The emergency provider has spoken with the patient and discussed todays results, in addition to providing specific details for the plan of care and counseling regarding the diagnosis and prognosis. Questions are answered at this time and they are agreeable with the plan.      --------------------------------- IMPRESSION AND DISPOSITION ---------------------------------    IMPRESSION  1.  Contusion of right knee, initial encounter        DISPOSITION  Disposition: Discharge to home  Patient condition is good                 Tawnya Sandifer, APRN - [place]OR[place]  03/25/21 4490

## 2021-04-06 ENCOUNTER — HOSPITAL ENCOUNTER (OUTPATIENT)
Dept: MRI IMAGING | Age: 51
Discharge: HOME | End: 2021-04-08
Payer: COMMERCIAL

## 2021-04-06 DIAGNOSIS — M48.02 CERVICAL STENOSIS OF SPINE: ICD-10-CM

## 2021-04-06 PROCEDURE — 72148 MRI LUMBAR SPINE W/O DYE: CPT

## 2021-04-21 ENCOUNTER — OFFICE VISIT (OUTPATIENT)
Dept: ORTHOPEDIC SURGERY | Age: 51
End: 2021-04-21
Payer: COMMERCIAL

## 2021-04-21 VITALS — HEIGHT: 60 IN | BODY MASS INDEX: 29.64 KG/M2 | WEIGHT: 151 LBS

## 2021-04-21 DIAGNOSIS — M25.561 RIGHT KNEE PAIN, UNSPECIFIED CHRONICITY: Primary | ICD-10-CM

## 2021-04-21 PROCEDURE — 99203 OFFICE O/P NEW LOW 30 MIN: CPT | Performed by: NURSE PRACTITIONER

## 2021-04-21 RX ORDER — ALBUTEROL SULFATE 2.5 MG/3ML
2.5 SOLUTION RESPIRATORY (INHALATION)
COMMUNITY
Start: 2021-02-02

## 2021-04-21 RX ORDER — GUAIFENESIN 1200 MG/1
1 TABLET, EXTENDED RELEASE ORAL 2 TIMES DAILY
COMMUNITY

## 2021-04-21 RX ORDER — ALBUTEROL SULFATE 90 UG/1
AEROSOL, METERED RESPIRATORY (INHALATION)
COMMUNITY
Start: 2017-08-03 | End: 2021-05-14

## 2021-04-21 RX ORDER — ALBUTEROL SULFATE 2.5 MG/3ML
3 SOLUTION RESPIRATORY (INHALATION)
COMMUNITY
Start: 2019-10-08 | End: 2021-05-14

## 2021-04-21 NOTE — PROGRESS NOTES
New Knee Patient     Referring Provider:   Ruthie Toro, DO  [address]  [address] . [facility],  [facility] [address]    CHIEF COMPLAINT:   Chief Complaint   Patient presents with    Knee Pain     (R) knee OA -- previous surgery done at [facility], had a torn meniscus, states she was told he removed her meniscus 1/2018 (no records). Had gel injections 3/2020 with minimal improvement. Did not wish to follow with Mick d/t billing. States she cannot kneel onto the (R) knee, squatting, walking etc all bothersome.  Other     Multiple herniated discs, h/o COPD, smoking. HPI:    Kenia Mclean is a 45y.o. year old female who is seen today  for evaluation of right knee pain. She reports the pain has been ongoing for the past month without known injury. She has been seen by another orthopedic surgeon who did a right meniscectomy in 2018. Patient has known osteoarthritis. Patient had concerns that she may have reinjured her knee as symptoms have gradually worsened. She reports the pain is worse with kneeling, better with rest.  The patient does not have mechanical symptoms. She denies a feeling of instability. Patient was seen and treated in the emergency department in [facility] imaging taken showed no acute fracture dislocation. The patient is working.     PAST MEDICAL HISTORY  Past Medical History:   Diagnosis Date    Anxiety     Asthma     Bipolar disorder ([address]     Chronic back pain     COPD     Fibrocystic breast changes     GERD (gastroesophageal reflux disease)     Hiatal hernia    Herpes zoster ophthalmicus     Irritable bowel syndrome     Obesity     Psoriasis     Psoriatic arthropathy ([address]     Tinea versicolor        PAST SURGICAL HISTORY  Past Surgical History:   Procedure Laterality Date    CHOLECYSTECTOMY  11/2003    HERNIA REPAIR  11/2003    KNEE ARTHROSCOPY      LAPAROSCOPY      LEEP      LYMPH NODE BIOPSY           FAMILY HISTORY   Family History Problem Relation Age of Onset    Cancer Mother 40        Non-Hodgkin Lymphoma    Diabetes Sister     High Blood Pressure Sister     Arthritis Sister         Gout    Arthritis Father     Psoriasis Father     Diabetes Father     High Blood Pressure Father     Kidney Disease Father     Heart Disease Father        SOCIAL HISTORY  Social History     Socioeconomic History    Marital status:      Spouse name: Not on file    Number of children: Not on file    Years of education: Not on file    Highest education level: Not on file   Occupational History    Not on file   Social Needs    Financial resource strain: Not on file    Food insecurity     Worry: Not on file     Inability: Not on file    Transportation needs     Medical: Not on file     Non-medical: Not on file   Tobacco Use    Smoking status: Current Every Day Smoker     Packs/day: 1.00     Years: 26.00     Pack years: 26.00     Types: Cigarettes    Smokeless tobacco: Never Used    Tobacco comment: down from 2 packs   Substance and Sexual Activity    Alcohol use: No    Drug use: No    Sexual activity: Yes   Lifestyle    Physical activity     Days per week: Not on file     Minutes per session: Not on file    Stress: Not on file   Relationships    Social connections     Talks on phone: Not on file     Gets together: Not on file     Attends Denominational service: Not on file     Active member of club or organization: Not on file     Attends meetings of clubs or organizations: Not on file     Relationship status: Not on file    Intimate partner violence     Fear of current or ex partner: Not on file     Emotionally abused: Not on file     Physically abused: Not on file     Forced sexual activity: Not on file   Other Topics Concern    Not on file   Social History Narrative    Not on file     Social History     Occupational History    Not on file   Tobacco Use    Smoking status: Current Every Day Smoker     Packs/day: 1.00     Years: 26.00     Pack years: 26.00     Types: Cigarettes    Smokeless tobacco: Never Used    Tobacco comment: down from 2 packs   Substance and Sexual Activity    Alcohol use: No    Drug use: No    Sexual activity: Yes       CURRENT MEDICATIONS     Current Outpatient Medications:     guaiFENesin (MUCINEX MAXIMUM STRENGTH) 1200 MG TB12, one tab daily, Disp: , Rfl:     albuterol (PROVENTIL) (2.5 MG/3ML) 0.083% nebulizer solution, , Disp: , Rfl:     albuterol (PROVENTIL) (2.5 MG/3ML) 0.083% nebulizer solution, Inhale 3 mLs into the lungs, Disp: , Rfl:     albuterol sulfate HFA (PROAIR HFA) 108 (90 Base) MCG/ACT inhaler, inhale 2 puff by inhalation route  every 4 - 6 hours as needed, Disp: , Rfl:     OXYGEN, apria 1 liter, Disp: , Rfl:     predniSONE (DELTASONE) 10 MG tablet, Take 10 mg by mouth daily, Disp: , Rfl:     loratadine (CLARITIN REDITABS) 10 MG dissolvable tablet, Take 10 mg by mouth daily, Disp: , Rfl:     Fluticasone-Umeclidin-Vilant (TRELEGY ELLIPTA IN), Inhale 1 puff into the lungs daily, Disp: , Rfl:     HYDROcodone-acetaminophen (NORCO)  MG per tablet, Take 1 tablet by mouth 3 times daily  . , Disp: , Rfl:     metoprolol succinate (TOPROL XL) 25 MG extended release tablet, Take 12.5 mg by mouth 2 times daily , Disp: , Rfl:     omeprazole (PRILOSEC) 20 MG delayed release capsule, Take 40 mg by mouth daily, Disp: , Rfl:     albuterol (PROVENTIL HFA;VENTOLIN HFA) 108 (90 BASE) MCG/ACT inhaler, Inhale 2 puffs into the lungs every 6 hours as needed. , Disp: , Rfl:     ALLERGIES  Allergies   Allergen Reactions    Codeine Hives    Sulfa Antibiotics Anaphylaxis and Hives    Demerol Hcl [Meperidine] Nausea And Vomiting       Controlled Substances Monitoring:          REVIEW OF SYSTEMS:     General: Negative Fever, chills, fatigue   Cardiovascular: Negative chest pain, palpitations  Respiratory: Equal chest expansion, negative shortness of breath   Skin: Negative rash, open wounds  Psych: Normal affect, mood stable  Neurologic: sensation grossly intact in extremities   Musculoskeletal: See HPI      PHYSICAL EXAM     Vitals:    04/21/21 1338   Weight: 151 lb (68.5 kg)   Height: 5' (1.524 m)       Height: 5' (1.524 m)  Weight: [unfilled]  BMI:  Body mass index is 29.49 kg/m². General: The patient is alert and oriented x 3, appears to be stated age and in no distress. HEENT: head is normocephalic, atraumatic. EOMI. Neck: supple, trachea midline, no thyromegaly   Cardiovascular: peripheral pulses palpable. Normal Capillary refill   Respiratory: breathing unlabored, chest expansion symmetric   Skin: no rash, no open wounds, no erythema  Psych: normal affect; mood stable  Neurologic: gait normal, sensation grossly intact in extremities  MSK:        Lower Extremity:   Ipsilateral hip exam shows normal range of motion without pain with impingement testing. Right knee exam range of motion 0-130, valgus and varus exams are stable at 0 and 30 degrees, no swelling or deformity visualized on inspection. Palpable Baker's cyst posterior knee. Medial joint line tenderness present. No palpable effusions. Stable patella tracked midline with crepitus. Stable knee on exam.      IMAGING:    XR: 4 views of the right knee show moderate to severe degenerative changes to the medial and patellofemoral compartments. Impression: Right knee osteoarthritis        ASSESSMENT  Right knee osteoarthritis    PLAN  Today we discussed her right knee. Patient reports recent onset of symptoms 1 month ago without injury. See injury emergency department imaging taken at that time showed no acute fracture dislocation. New imaging obtained today shows severe degenerative changes to the right knee consistent with osteoarthritis. She did have a history of a meniscectomy in 2018.   Patient has tried other conservative treatment including physical therapy activity modification cortisone injection in the past.  Patient wanted

## 2021-05-14 ENCOUNTER — OFFICE VISIT (OUTPATIENT)
Dept: ORTHOPEDIC SURGERY | Age: 51
End: 2021-05-14
Payer: COMMERCIAL

## 2021-05-14 VITALS — WEIGHT: 151 LBS | BODY MASS INDEX: 29.64 KG/M2 | HEIGHT: 60 IN

## 2021-05-14 DIAGNOSIS — M17.11 OSTEOARTHRITIS OF RIGHT KNEE, UNSPECIFIED OSTEOARTHRITIS TYPE: Primary | ICD-10-CM

## 2021-05-14 PROCEDURE — 99214 OFFICE O/P EST MOD 30 MIN: CPT | Performed by: ORTHOPAEDIC SURGERY

## 2021-05-14 NOTE — PROGRESS NOTES
Follow Up Visit     Luciana Carter returns today for follow up visit regarding Right knee osteoarthritis. Treatment thus far has included conservative treatment in the past including physical therapy activity modification cortisone injection. She reports symptoms are unchanged. She would like to discuss surgery / replacement. Physical Exam:     Height: 5' (1.524 m), Weight: 151 lb (68.5 kg)    General: Alert and oriented x3, no acute distress  Cardiovascular/pulmonary: No labored breathing, peripheral perfusion intact  Musculoskeletal:    Right knee exam range of motion 0-120, moderate swelling visualized on inspection, small palpable effusion present. Joint line tenderness present. Patella tracked midline. Valgus varus exams are intact. Stable knee on exam.    Controlled Substances Monitoring:      Imaging:  No new imaging obtained today. Previous x-rays of the right knee were reviewed which shows degenerative changes to the medial and patellofemoral compartments      Assessment: Right knee osteoarthritis      Plan: Today we discussed her right knee. Patient reports severe intermittent swelling with ambulation. Imaging reviewed today shows moderate to severe degenerative changes to the medial and patellofemoral compartments consistent with osteoarthritis. Patient has history of known osteoarthritis and has received cortisone and Visco supplement injections in the past with minimal relief. She has tried activity modification, physical therapy and over-the-counter anti-inflammatories for symptom relief. She has failed conservative treatment. On exam today she has swelling visualized with joint line tenderness present. With failure of conservative treatment patient wished to proceed with surgical intervention specifically a right total knee arthroplasty. Patient has a history of psoriatic arthritis which does not make her a candidate for partial knee replacement surgery.   Patient will call to schedule in the near future. Patient will require preoperative visit. Melody Hernandez CNP  Orthopedic Surgery   05/14/21  12:57 PM      Staff Addendum    I have seen and evaluated the patient and agree with the assessment and plan as documented by Melody Hernandez CNP. I have performed the key components of the history and physical examination and concur with the findings and plan, and have made changes where appropriate/necessary. Agree with above. Medial and patellofemoral changes, mainly medial but maintained joint space. We discussed potential partial knee replacement although she has history of psoriatic arthritis. I think she would do better with total knee replacement if she is leaning toward surgery. We also discussed continued conservative treatment, she is no longer interested due to progression of symptoms and pain.   Surgery will involve right knee Jake robot assisted total knee replacement she will call us regarding scheduling we will see her back for preoperative appointment      Asha Singh MD  [address]

## 2021-05-19 ENCOUNTER — HOSPITAL ENCOUNTER (OUTPATIENT)
Dept: MAMMOGRAPHY | Age: 51
Discharge: HOME | End: 2021-05-21
Payer: COMMERCIAL

## 2021-05-19 DIAGNOSIS — Z79.52 CURRENT CHRONIC USE OF SYSTEMIC STEROIDS: ICD-10-CM

## 2021-05-19 DIAGNOSIS — J44.9 ASTHMA WITH COPD (HCC): ICD-10-CM

## 2021-05-19 PROCEDURE — 77080 DXA BONE DENSITY AXIAL: CPT

## 2021-06-03 ENCOUNTER — OFFICE VISIT (OUTPATIENT)
Dept: NEUROLOGY | Age: 51
End: 2021-06-03
Payer: COMMERCIAL

## 2021-06-03 VITALS
WEIGHT: 151 LBS | TEMPERATURE: 97.9 F | OXYGEN SATURATION: 97 % | BODY MASS INDEX: 29.64 KG/M2 | RESPIRATION RATE: 16 BRPM | HEART RATE: 93 BPM | SYSTOLIC BLOOD PRESSURE: 127 MMHG | HEIGHT: 60 IN | DIASTOLIC BLOOD PRESSURE: 80 MMHG

## 2021-06-03 DIAGNOSIS — R25.2 LEG CRAMPS: ICD-10-CM

## 2021-06-03 DIAGNOSIS — R20.0 LEG NUMBNESS: ICD-10-CM

## 2021-06-03 PROCEDURE — 95910 NRV CNDJ TEST 7-8 STUDIES: CPT | Performed by: PSYCHIATRY & NEUROLOGY

## 2021-06-03 PROCEDURE — 95886 MUSC TEST DONE W/N TEST COMP: CPT | Performed by: PSYCHIATRY & NEUROLOGY

## 2021-06-03 NOTE — PROGRESS NOTES
[facility]  Electrodiagnostic Laboratory  *Accredited by the [facility] with exemplary status  [address] [facility]  Phone: [phone]  Fax: [phone]    Referring Provider: Yashira Burks DO  Primary Care Physician: Corey Springer DO  Patient Name: Allan Best  Patient YOB: 1970  Gender: female  BMI: Body mass index is 29.49 kg/m². Blood pressure 127/80, pulse 93, temperature 97.9 °F (36.6 °C), temperature source Temporal, resp. rate 16, height 5' (1.524 m), weight 151 lb (68.5 kg), SpO2 97 %, not currently breastfeeding. 6/3/2021    Description of clinical problem: The patient is coming with complaints of numbness and tingling in the left leg and cramps in bilateral legs. Chief Complaint   Patient presents with    Procedure     EMG BLE     Pain No   ; Numbness/tingling  Yes; Weakness  No       Brief physical exam:   Sensory deficit Yes; Weakness No; Atrophy  No; Reflex abnormality No  Motor NCS Lower      Nerve / Sites Onset Amp. 1-2 Dist Sergio Temp. Amp. 1-2 d Lat. ms mV cm m/s °C % ms   L COMM PERONEAL - EDB      Ankle 6.5 3.1 8  31.5 100 6.77      Knee 14.90 2.7 34 41.8 31.6 87.2 8.13   R COMM PERONEAL - EDB      Ankle 4.38 5.2 8  31.2 100 4.38      Knee 11.88 5.2 34 45.3 31.2 101 7.50   L TIBIAL (KNEE) - AH      Ankle 3.96 18.6 8  31.3 100 3.96      Knee 12.86 15.9 37 41.5 31.3 85.1 8.91     Sensory NCS Lower      Nerve / Sites Onset Peak PP Amp Dist Sergio Temp.    ms ms µV cm m/s °C   L SURAL - Lat Mall      Ach. Ten. 3.07 3.96 23.4 14 45.6 31.3   L SUP PERONEAL      Lat Leg 2.50 3.39 8.0 10 40.0 31.1   R SUP PERONEAL      Lat Leg 2.40 3.07 29.4 10 41.7 31.2     F  Wave      Nerve Fmin    ms   L COMM PERONEAL 48.49   R COMM PERONEAL 44.06       HReflex      Nerve H Lat.    ms   L TIBIAL (KNEE) - [place] 25.52   R TIBIAL (KNEE) - [place] 27.40       Needle EMG      EMG Summary Table     Spontaneous MUAP Recruitment    IA Fib PSW Fasc H.F.  Amp Dur. PPP Pattern   R. VAST LATERALIS N None None None None N N N N   R. VAST MEDIALIS N None None None None N N N N   R. TIB ANTERIOR N None None None None N N N N   R. PERON LONGUS N None None None None N N N N   R. GASTROCN (MED) N None None None None N N N N   L. VAST LATERALIS N None None None None N N N N   L. VAST MEDIALIS N None None None None N N N N   L. TIB ANTERIOR N None None None None N N N N   L. PERON LONGUS N None None None None N N N N   L. GASTROCN (MED) N None None None None N N N N       Study Limitations:  None    Summary of Findings:   Nerve conduction studies:   · All the nerve conduction studies listed in the table above were normal in latency, amplitude and conduction velocity. Needle EMG:   · Needle EMG was performed using a concentric needle.  All the muscles tested, as listed in the table above demonstrated normal amplitude, duration, phases and recruitment and no active denervation signs were seen. Diagnostic Interpretation: This study was normal.     Electrodiagnosis: There is no evidence of an underlying radiculopathy, entrapment neuropathy or large fiber peripheral neuropathy. Previous Study: NA      Follow up EMG is recommended if clinically indicated. Technologist: [address]    Physician: Yung Mendenhall MD    Nerve conduction studies and electromyography were performed according to our laboratory policies and procedures which can be provided upon request. All abnormal values are identified in the table.  Laboratory normal values can also be provided upon request.       Cc: DO Sapna Romero DO

## 2021-06-17 ENCOUNTER — TELEPHONE (OUTPATIENT)
Dept: ORTHOPEDIC SURGERY | Age: 51
End: 2021-06-17

## 2021-06-17 DIAGNOSIS — M17.11 PRIMARY OSTEOARTHRITIS OF RIGHT KNEE: Primary | ICD-10-CM

## 2021-06-17 NOTE — TELEPHONE ENCOUNTER
Spoke huong Calero at [facility], she advised that pre Eulalia Craft is required for STEVE MORRISON.([id number]) All clinical documentation faxed for pre auth.

## 2021-06-17 NOTE — TELEPHONE ENCOUNTER
Angelica w [facility] in [place] scheduling to schedule R JOHNNIE TKA on 07/12/21 in [address] [place] [address].

## 2021-06-22 ENCOUNTER — HOSPITAL ENCOUNTER (OUTPATIENT)
Dept: CT IMAGING | Age: 51
Discharge: HOME | End: 2021-06-24
Payer: COMMERCIAL

## 2021-06-22 DIAGNOSIS — M17.11 PRIMARY OSTEOARTHRITIS OF RIGHT KNEE: ICD-10-CM

## 2021-06-22 PROCEDURE — 73700 CT LOWER EXTREMITY W/O DYE: CPT

## 2021-07-07 ENCOUNTER — HOSPITAL ENCOUNTER (OUTPATIENT)
Dept: PREADMISSION TESTING | Age: 51
Discharge: HOME | End: 2021-07-07
Payer: COMMERCIAL

## 2021-07-07 ENCOUNTER — OFFICE VISIT (OUTPATIENT)
Dept: ORTHOPEDIC SURGERY | Age: 51
End: 2021-07-07

## 2021-07-07 ENCOUNTER — ANESTHESIA EVENT (OUTPATIENT)
Dept: OPERATING ROOM | Age: 51
End: 2021-07-07
Payer: COMMERCIAL

## 2021-07-07 VITALS
HEIGHT: 60 IN | WEIGHT: 146 LBS | BODY MASS INDEX: 28.66 KG/M2 | SYSTOLIC BLOOD PRESSURE: 137 MMHG | TEMPERATURE: 98.2 F | HEART RATE: 95 BPM | DIASTOLIC BLOOD PRESSURE: 70 MMHG | OXYGEN SATURATION: 96 % | RESPIRATION RATE: 20 BRPM

## 2021-07-07 VITALS — HEIGHT: 60 IN | WEIGHT: 146 LBS | BODY MASS INDEX: 28.66 KG/M2

## 2021-07-07 DIAGNOSIS — M17.11 OSTEOARTHRITIS OF RIGHT KNEE, UNSPECIFIED OSTEOARTHRITIS TYPE: Primary | ICD-10-CM

## 2021-07-07 DIAGNOSIS — Z01.818 PREOP TESTING: ICD-10-CM

## 2021-07-07 LAB
ANION GAP SERPL CALCULATED.3IONS-SCNC: 15 MMOL/L (ref 7–16)
ANISOCYTOSIS: ABNORMAL
BASOPHILS ABSOLUTE: 0.11 E9/L (ref 0–0.2)
BASOPHILS RELATIVE PERCENT: 0.9 % (ref 0–2)
BUN BLDV-MCNC: 10 MG/DL (ref 6–20)
CALCIUM SERPL-MCNC: 9.7 MG/DL (ref 8.6–10.2)
CHLORIDE BLD-SCNC: 100 MMOL/L (ref 98–107)
CO2: 23 MMOL/L (ref 22–29)
CREAT SERPL-MCNC: 0.6 MG/DL (ref 0.5–1)
EKG ATRIAL RATE: 91 BPM
EKG P AXIS: 77 DEGREES
EKG P-R INTERVAL: 124 MS
EKG Q-T INTERVAL: 372 MS
EKG QRS DURATION: 88 MS
EKG QTC CALCULATION (BAZETT): 457 MS
EKG R AXIS: 31 DEGREES
EKG T AXIS: 61 DEGREES
EKG VENTRICULAR RATE: 91 BPM
EOSINOPHILS ABSOLUTE: 0 E9/L (ref 0.05–0.5)
EOSINOPHILS RELATIVE PERCENT: 0 % (ref 0–6)
GFR AFRICAN AMERICAN: >60
GFR NON-AFRICAN AMERICAN: >60 ML/MIN/1.73
GLUCOSE BLD-MCNC: 135 MG/DL (ref 74–99)
HCT VFR BLD CALC: 47.9 % (ref 34–48)
HEMOGLOBIN: 16 G/DL (ref 11.5–15.5)
LYMPHOCYTES ABSOLUTE: 1.27 E9/L (ref 1.5–4)
LYMPHOCYTES RELATIVE PERCENT: 9.6 % (ref 20–42)
MCH RBC QN AUTO: 33.3 PG (ref 26–35)
MCHC RBC AUTO-ENTMCNC: 33.4 % (ref 32–34.5)
MCV RBC AUTO: 99.8 FL (ref 80–99.9)
MONOCYTES ABSOLUTE: 0.25 E9/L (ref 0.1–0.95)
MONOCYTES RELATIVE PERCENT: 1.7 % (ref 2–12)
NEUTROPHILS ABSOLUTE: 11.18 E9/L (ref 1.8–7.3)
NEUTROPHILS RELATIVE PERCENT: 87.8 % (ref 43–80)
NUCLEATED RED BLOOD CELLS: 0 /100 WBC
PDW BLD-RTO: 13.5 FL (ref 11.5–15)
PLATELET # BLD: 252 E9/L (ref 130–450)
PMV BLD AUTO: 9.6 FL (ref 7–12)
POLYCHROMASIA: ABNORMAL
POTASSIUM SERPL-SCNC: 4 MMOL/L (ref 3.5–5)
PREALBUMIN: 33 MG/DL (ref 20–40)
RBC # BLD: 4.8 E12/L (ref 3.5–5.5)
SODIUM BLD-SCNC: 138 MMOL/L (ref 132–146)
WBC # BLD: 12.7 E9/L (ref 4.5–11.5)

## 2021-07-07 PROCEDURE — 99024 POSTOP FOLLOW-UP VISIT: CPT | Performed by: ORTHOPAEDIC SURGERY

## 2021-07-07 PROCEDURE — 84134 ASSAY OF PREALBUMIN: CPT

## 2021-07-07 PROCEDURE — 80048 BASIC METABOLIC PNL TOTAL CA: CPT

## 2021-07-07 PROCEDURE — 36415 COLL VENOUS BLD VENIPUNCTURE: CPT

## 2021-07-07 PROCEDURE — 85025 COMPLETE CBC W/AUTO DIFF WBC: CPT

## 2021-07-07 PROCEDURE — 87081 CULTURE SCREEN ONLY: CPT

## 2021-07-07 PROCEDURE — 93005 ELECTROCARDIOGRAM TRACING: CPT

## 2021-07-07 RX ORDER — LIDOCAINE HYDROCHLORIDE 10 MG/ML
10 INJECTION, SOLUTION INFILTRATION; PERINEURAL
Status: CANCELLED | OUTPATIENT
Start: 2021-07-07 | End: 2021-07-07

## 2021-07-07 RX ORDER — FENTANYL CITRATE 50 UG/ML
100 INJECTION, SOLUTION INTRAMUSCULAR; INTRAVENOUS ONCE
Status: CANCELLED | OUTPATIENT
Start: 2021-07-07 | End: 2021-07-07

## 2021-07-07 RX ORDER — MIDAZOLAM HYDROCHLORIDE 2 MG/2ML
1 INJECTION, SOLUTION INTRAMUSCULAR; INTRAVENOUS EVERY 5 MIN PRN
Status: CANCELLED | OUTPATIENT
Start: 2021-07-07

## 2021-07-07 RX ORDER — DEXAMETHASONE SODIUM PHOSPHATE 10 MG/ML
4 INJECTION, SOLUTION INTRAMUSCULAR; INTRAVENOUS ONCE
Status: CANCELLED | OUTPATIENT
Start: 2021-07-07 | End: 2021-07-07

## 2021-07-07 RX ORDER — ROPIVACAINE HYDROCHLORIDE 5 MG/ML
30 INJECTION, SOLUTION EPIDURAL; INFILTRATION; PERINEURAL
Status: CANCELLED | OUTPATIENT
Start: 2021-07-07 | End: 2021-07-07

## 2021-07-07 RX ORDER — LORATADINE 10 MG/1
10 TABLET ORAL DAILY
COMMUNITY

## 2021-07-07 ASSESSMENT — PAIN DESCRIPTION - ORIENTATION
ORIENTATION_3: LOWER
ORIENTATION_2: POSTERIOR
ORIENTATION: RIGHT

## 2021-07-07 ASSESSMENT — KOOS JR
STANDING UPRIGHT: 3
STRAIGHTENING KNEE FULLY: 2
TWISING OR PIVOTING ON KNEE: 3
HOW SEVERE IS YOUR KNEE STIFFNESS AFTER FIRST WAKING IN MORNING: 2
GOING UP OR DOWN STAIRS: 3
BENDING TO THE FLOOR TO PICK UP OBJECT: 3
RISING FROM SITTING: 2

## 2021-07-07 ASSESSMENT — LIFESTYLE VARIABLES: SMOKING_STATUS: 1

## 2021-07-07 ASSESSMENT — PAIN DESCRIPTION - DESCRIPTORS
DESCRIPTORS: THROBBING;CONSTANT
DESCRIPTORS_2: PRESSURE;TINGLING
DESCRIPTORS_3: ACHING;STABBING

## 2021-07-07 ASSESSMENT — PAIN DESCRIPTION - PAIN TYPE
TYPE: CHRONIC PAIN
TYPE_2: CHRONIC PAIN
TYPE_3: CHRONIC PAIN

## 2021-07-07 ASSESSMENT — PAIN SCALES - GENERAL: PAINLEVEL_OUTOF10: 2

## 2021-07-07 ASSESSMENT — PAIN DESCRIPTION - FREQUENCY: FREQUENCY: CONTINUOUS

## 2021-07-07 ASSESSMENT — PAIN DESCRIPTION - DIRECTION
RADIATING_TOWARDS_2: NONE AT PRESENT
RADIATING_TOWARDS_3: NONE AT PRESENT

## 2021-07-07 ASSESSMENT — PAIN DESCRIPTION - PROGRESSION
CLINICAL_PROGRESSION_2: NOT CHANGED
CLINICAL_PROGRESSION: RAPIDLY WORSENING
CLINICAL_PROGRESSION_3: NOT CHANGED

## 2021-07-07 ASSESSMENT — PAIN DESCRIPTION - LOCATION
LOCATION: KNEE
LOCATION_3: BACK
LOCATION_2: NECK

## 2021-07-07 ASSESSMENT — PAIN DESCRIPTION - DURATION
DURATION_2: INTERMITTENT
DURATION_3: INTERMITTENT

## 2021-07-07 ASSESSMENT — PAIN DESCRIPTION - ONSET
ONSET_2: SUDDEN
ONSET: ON-GOING
ONSET_3: SUDDEN

## 2021-07-07 ASSESSMENT — PAIN DESCRIPTION - INTENSITY
RATING_3: 0
RATING_2: 0

## 2021-07-07 ASSESSMENT — COPD QUESTIONNAIRES: CAT_SEVERITY: SEVERE

## 2021-07-07 ASSESSMENT — PAIN - FUNCTIONAL ASSESSMENT: PAIN_FUNCTIONAL_ASSESSMENT: PREVENTS OR INTERFERES WITH MANY ACTIVE NOT PASSIVE ACTIVITIES

## 2021-07-07 NOTE — PROGRESS NOTES
Department of Orthopedic Surgery  Attending Pre-operative History and Physical        DIAGNOSIS:  Right knee osteoarthritis    INDICATION:  Failed conservative management for right knee arthritis    PROCEDURE:  Right knee JOHNNIE assisted total knee arthroplasty    CHIEF COMPLAINT:  Right Knee Pain    History Obtained From:  patient    HISTORY OF PRESENT ILLNESS:      The patient is a 44 YO femal with significant past medical history of COPD, hyperlipidemia, anxiety, asthma and back pain  who presents with right knee pain. Patient has been recalcitrant to multiple conservative treatments including PT, Injections, medications and activity modifications. She is here today for pre operative discussion for her right knee total arthroplasty.     Past Medical History:     Anxiety      Arthritis      Asthma      Chronic back pain      Chronic lower back pain      Chronic neck pain      COPD      Difficult intravenous access       and venipuncture    Fibrocystic breast changes      GERD (gastroesophageal reflux disease)       Hiatal hernia    Herpes zoster ophthalmicus      History of tachycardia       follows with PCP    Irritable bowel syndrome      Obesity      On supplemental oxygen therapy       1l/min/nc nightly and daily prn    Psoriasis      Psoriatic arthropathy (HCC)      Right knee pain 07/2021     for TJAK 07/2021    Tinea versicolor      Wears contact lenses      Wears dentures       upper    Wears glasses        Past Surgical History:     CHOLECYSTECTOMY   11/2003    HERNIA REPAIR   30/3503     umbilical    KNEE ARTHROSCOPY Right      LAPAROSCOPY        LEEP        LYMPH NODE BIOPSY       Medications Prior to Admission:   Current Outpatient Medications on File Prior to Visit   Medication Sig Dispense Refill    guaiFENesin (MUCINEX MAXIMUM STRENGTH) 1200 MG TB12 Take 1 tablet by mouth 2 times daily       albuterol (PROVENTIL) (2.5 MG/3ML) 0.083% nebulizer solution Take 2.5 mg by present. Joint line tenderness present. Patella tracked midline. Valgus varus exams are intact. Stable knee on exam.    DATA:  CBC:   Lab Results   Component Value Date     WBC 12.7 07/07/2021     RBC 4.80 07/07/2021     HGB 16.0 07/07/2021     HCT 47.9 07/07/2021     MCV 99.8 07/07/2021     MCH 33.3 07/07/2021     MCHC 33.4 07/07/2021     RDW 13.5 07/07/2021      07/07/2021     MPV 9.6 07/07/2021      BMP:          Lab Results   Component Value Date      07/07/2021     K 4.0 07/07/2021     K 4.9 12/29/2019      07/07/2021     CO2 23 07/07/2021     BUN 10 07/07/2021     LABALBU 3.9 12/29/2019     LABALBU 4.6 02/08/2011     CREATININE 0.6 07/07/2021     CALCIUM 9.7 07/07/2021     GFRAA >60 07/07/2021     LABGLOM >60 07/07/2021     GLUCOSE 135 07/07/2021     GLUCOSE 113 02/08/2011     Radiology Review: 2 views of the right knee show medial joint space narrowing with mild sclerosis of the subchondral regions. There is squaring of the femoral and tibial condyles. ASSESSMENT AND PLAN:    1. Patient is a 76-year-old female with above specified procedure planned right knee Jake robot assisted total knee arthroplasty, plan spinal anesthesia. 2.  Procedure options, risks and benefits reviewed with patient. Patient expresses understanding and has signed consent form to proceed. 3.  Patient and family were provided with pre-op and post-op instructions, prescription medications, and any other supplied needed post op (slings, braces, etc.)    Electronically signed by Subhash Lovell DO on 7/7/21 at 3:39 PM EDT      Staff Addendum    I have seen and evaluated the patient and agree with the assessment and plan as documented by the orthopaedic surgery resident. I have performed the key components of the history and physical examination and concur with the findings and plan, and have made changes where appropriate/necessary.         Yvon Butler MD  [address]

## 2021-07-07 NOTE — ANESTHESIA PRE PROCEDURE
Department of Anesthesiology  Preprocedure Note       Name:  Jacinto Barnes   Age:  45 y.o.  :  1970                                          MRN:  [id number]         Date:  2021      Surgeon: Nikolas Smith):  Lisandra Henson MD    Procedure: Procedure(s):  RIGHT KNEE JOHNNIE ROBOTIC ASSISTED TOTAL ARTHROPLASTY   ++DIANA++ +++PNB++    Medications prior to admission:   Prior to Admission medications    Medication Sig Start Date End Date Taking? Authorizing Provider   guaiFENesin (MUCINEX MAXIMUM STRENGTH) 1200 MG TB12 one tab daily    Historical Provider, MD   albuterol (PROVENTIL) (2.5 MG/3ML) 0.083% nebulizer solution  21   Historical Provider, MD   OXYGEN apria 1 liter    Historical Provider, MD   predniSONE (DELTASONE) 10 MG tablet Take 10 mg by mouth daily    Historical Provider, MD   loratadine (CLARITIN REDITABS) 10 MG dissolvable tablet Take 10 mg by mouth daily    Historical Provider, MD   Fluticasone-Umeclidin-Vilant (TRELEGY ELLIPTA IN) Inhale 1 puff into the lungs daily    Historical Provider, MD   HYDROcodone-acetaminophen (NORCO)  MG per tablet Take 1 tablet by mouth 3 times daily  . Historical Provider, MD   metoprolol succinate (TOPROL XL) 25 MG extended release tablet Take 12.5 mg by mouth 2 times daily     Historical Provider, MD   omeprazole (PRILOSEC) 20 MG delayed release capsule Take 40 mg by mouth daily    Historical Provider, MD   albuterol (PROVENTIL HFA;VENTOLIN HFA) 108 (90 BASE) MCG/ACT inhaler Inhale 2 puffs into the lungs every 6 hours as needed.     Historical Provider, MD       Current medications:    Current Outpatient Medications   Medication Sig Dispense Refill    guaiFENesin (MUCINEX MAXIMUM STRENGTH) 1200 MG TB12 one tab daily      albuterol (PROVENTIL) (2.5 MG/3ML) 0.083% nebulizer solution       OXYGEN apria 1 liter      predniSONE (DELTASONE) 10 MG tablet Take 10 mg by mouth daily      loratadine (CLARITIN REDITABS) 10 MG dissolvable tablet Take 10 mg by mouth daily      Fluticasone-Umeclidin-Vilant (TRELEGY ELLIPTA IN) Inhale 1 puff into the lungs daily      HYDROcodone-acetaminophen (NORCO)  MG per tablet Take 1 tablet by mouth 3 times daily  .  metoprolol succinate (TOPROL XL) 25 MG extended release tablet Take 12.5 mg by mouth 2 times daily       omeprazole (PRILOSEC) 20 MG delayed release capsule Take 40 mg by mouth daily      albuterol (PROVENTIL HFA;VENTOLIN HFA) 108 (90 BASE) MCG/ACT inhaler Inhale 2 puffs into the lungs every 6 hours as needed. No current facility-administered medications for this encounter. Allergies:     Allergies   Allergen Reactions    Codeine Hives    Sulfa Antibiotics Anaphylaxis and Hives    Demerol Hcl [Meperidine] Nausea And Vomiting       Problem List:    Patient Active Problem List   Diagnosis Code    Inflammation and stiffening of spine ([facility]) M46.90    Chronic obstructive pulmonary disease ([facility]) J44.9    Anxiety F41.9    Chronic back pain M54.9, G89.29    Psoriasis L40.9    GERD (gastroesophageal reflux disease) K21.9    Asthma J45.909    Bipolar disorder ([facility]) F31.9    Obesity E66.9    COPD (chronic obstructive pulmonary disease) ([facility]) J44.9    Hyperlipidemia E78.5    Impaired fasting glucose R73.01    Plantar wart B07.0    Plantar fasciitis M72.2    COPD exacerbation ([facility]) J44.1    RSV bronchitis J20.5       Past Medical History:        Diagnosis Date    Anxiety     Asthma     Bipolar disorder ([facility])     Chronic back pain     COPD     Fibrocystic breast changes     GERD (gastroesophageal reflux disease)     Hiatal hernia    Herpes zoster ophthalmicus     Irritable bowel syndrome     Obesity     Psoriasis     Psoriatic arthropathy ([facility])     Tinea versicolor        Past Surgical History:        Procedure Laterality Date    CHOLECYSTECTOMY  11/2003    HERNIA REPAIR  11/2003    KNEE ARTHROSCOPY      LAPAROSCOPY      LEEP      LYMPH NODE BIOPSY         Social History:    Social History     Tobacco Use    Smoking status: Current Every Day Smoker     Packs/day: 1.00     Years: 26.00     Pack years: 26.00     Types: Cigarettes    Smokeless tobacco: Never Used    Tobacco comment: down from 2 packs   Substance Use Topics    Alcohol use: No                                Ready to quit: Not Answered  Counseling given: Not Answered  Comment: down from 2 packs      Vital Signs (Current): There were no vitals filed for this visit. BP Readings from Last 3 Encounters:   06/03/21 127/80   03/25/21 (!) 166/81   12/31/19 (!) 140/67       NPO Status:                                                                                 BMI:   Wt Readings from Last 3 Encounters:   06/03/21 151 lb (68.5 kg)   05/14/21 151 lb (68.5 kg)   04/21/21 151 lb (68.5 kg)     There is no height or weight on file to calculate BMI.    CBC:   Lab Results   Component Value Date    WBC 7.0 12/29/2019    RBC 4.34 12/29/2019    HGB 13.4 12/29/2019    HCT 42.6 12/29/2019    MCV 98.2 12/29/2019    RDW 13.2 12/29/2019     12/29/2019       CMP:   Lab Results   Component Value Date     12/29/2019    K 4.9 12/29/2019     12/29/2019    CO2 32 12/29/2019    BUN 21 12/29/2019    CREATININE 0.5 12/29/2019    GFRAA >60 12/29/2019    LABGLOM >60 12/29/2019    GLUCOSE 139 12/29/2019    GLUCOSE 113 02/08/2011    PROT 7.3 12/29/2019    CALCIUM 9.9 12/29/2019    BILITOT <0.2 12/29/2019    ALKPHOS 59 12/29/2019    AST 36 12/29/2019    ALT 42 12/29/2019       POC Tests: No results for input(s): POCGLU, POCNA, POCK, POCCL, POCBUN, POCHEMO, POCHCT in the last 72 hours.     Coags:   Lab Results   Component Value Date    PROTIME 11.8 04/16/2013    INR 1.1 04/16/2013    APTT 32.3 04/16/2013       HCG (If Applicable):   Lab Results   Component Value Date    PREGTESTUR NEGATIVE 04/16/2013        ABGs: No results found for: PHART, PO2ART, LCG3IYM, IYU0TNT, BEART, S6FVVJAB Type & Screen (If Applicable):  No results found for: LABABO, LABRH    Drug/Infectious Status (If Applicable):  Lab Results   Component Value Date    HEPCAB NON REACT 04/23/2011       COVID-19 Screening (If Applicable): No results found for: COVID19        Anesthesia Evaluation  Patient summary reviewed no history of anesthetic complications:   Airway: Mallampati: II  TM distance: >3 FB   Neck ROM: full  Mouth opening: > = 3 FB Dental:    (+) upper dentures      Pulmonary: breath sounds clear to auscultation  (+) COPD: severe,  asthma: current smoker          Patient smoked on day of surgery. ROS comment: Chronic hypoxic resp failure  Chronic cough prod clear   Cardiovascular:Negative CV ROS          ECG reviewed  Rhythm: regular  Rate: normal           Beta Blocker:  Not on Beta Blocker         Neuro/Psych:   (+) neuromuscular disease:, psychiatric history (Bipolar disorder (HCC)):depression/anxiety             GI/Hepatic/Renal:   (+) GERD:,          ROS comment: Irritable bowel syndrome. Endo/Other:    (+) : arthritis (Psoriatic arthropathy ([facility] [address])):., .                  ROS comment: Impaired fasting glucose\  Steroid dependent Abdominal:             Vascular: negative vascular ROS. Other Findings:           Anesthesia Plan      regional and spinal     ASA 3     (Pt agrees to SAB, ACB, back up GA)  Induction: intravenous. MIPS: Postoperative opioids intended and Prophylactic antiemetics administered. Anesthetic plan and risks discussed with patient. Plan discussed with CRNA. Nuris Rodriguez MD   7/7/2021    DOS STAFF ADDENDUM:    Patient seen and examined, physical exam updated as needed, chart reviewed. NPO status confirmed. Anesthetic options and risks discussed with patient/legal guardian. Patient/legal guardian verbalized understanding and agrees to proceed.      Nuris Rodriguez MD  Staff Anesthesiologist  July 7, 2021  8:06 AM    DOS STAFF ADDENDUM:    Pt seen and examined, chart reviewed (including anesthesia, drug and allergy history). Patient also agreed to [facility] block    Anesthetic plan, risks, benefits, alternatives, and personnel involved discussed with patient. Patient verbalized an understanding and agrees to proceed. Plan discussed with care team members and agreed upon.     Shirley Mcgrath MD  Staff Anesthesiologist  6:42 AM

## 2021-07-07 NOTE — PROGRESS NOTES
CBC/diff of 07/07/21 PAT visit  results faxed to Dr Edel Mendoza review. Message left on VM for Marisol at Dr Shanell Echevarria's office RE: above CBC result abnormal and faxed.

## 2021-07-07 NOTE — PROGRESS NOTES
Department of Orthopedic Surgery  Attending Pre-operative History and Physical        DIAGNOSIS:  Right knee osteoarthritis    INDICATION:  Failed conservative management    PROCEDURE:  RIGHT KNEE JOHNNIE ROBOTIC ASSISTED TOTAL ARTHROPLASTY       CHIEF COMPLAINT:  Right knee pain    History Obtained From:  Patient    HISTORY OF PRESENT ILLNESS:      The patient is a 45 y.o. female with significant past medical history of right knee osteoarthritis who presents with ***    Past Medical History:        Diagnosis Date    Anxiety     Arthritis     Asthma     Chronic back pain     Chronic lower back pain     Chronic neck pain     COPD     Difficult intravenous access     and venipuncture    Fibrocystic breast changes     GERD (gastroesophageal reflux disease)     Hiatal hernia    Herpes zoster ophthalmicus     History of tachycardia     follows with PCP    Irritable bowel syndrome     Obesity     On supplemental oxygen therapy     1l/min/nc nightly and daily prn    Psoriasis     Psoriatic arthropathy ([address]     Right knee pain 07/2021    for TJAK 07/2021    Tinea versicolor     Wears contact lenses     Wears dentures     upper    Wears glasses        Past Surgical History:        Procedure Laterality Date    CHOLECYSTECTOMY  11/2003    HERNIA REPAIR  90/6755    umbilical    KNEE ARTHROSCOPY Right     LAPAROSCOPY      LEEP      LYMPH NODE BIOPSY         Medications Prior to Admission:   Not in a hospital admission. Allergies:  Codeine, Sulfa antibiotics, and Demerol hcl [meperidine]    History of allergic reaction to anesthesia:  {NO/YES:827031460}    Social History:   TOBACCO:   reports that she has been smoking cigarettes. She has a 20.00 pack-year smoking history. She has never used smokeless tobacco.  ETOH:   reports current alcohol use. DRUGS:   reports no history of drug use.     Family History:       Problem Relation Age of Onset    Cancer Mother 40        Non-Hodgkin Lymphoma    Diabetes Sister     High Blood Pressure Sister     Arthritis Sister         Gout    Arthritis Father     Psoriasis Father     Diabetes Father     High Blood Pressure Father     Kidney Disease Father     Heart Disease Father        REVIEW OF SYSTEMS:  CONSTITUTIONAL:  negative  RESPIRATORY:  negative  CARDIOVASCULAR:  negative  MUSCULOSKELETAL:  negative except for  HPI  NEUROLOGICAL:  negative    PHYSICAL EXAM:     VITALS:  There were no vitals taken for this visit. Gen: AOx3, NAD    Heart:  normal apical pulses, normal S1 and S2 and no edema    Lungs:  No increased work of breathing, good air exchange     Abdomen:  no scars, non-distended and non-tender    Extremities:  No clubbing, cyanosis, or edema    Musculoskeletal:          DATA:  CBC:   Lab Results   Component Value Date    WBC 12.7 07/07/2021    RBC 4.80 07/07/2021    HGB 16.0 07/07/2021    HCT 47.9 07/07/2021    MCV 99.8 07/07/2021    MCH 33.3 07/07/2021    MCHC 33.4 07/07/2021    RDW 13.5 07/07/2021     07/07/2021    MPV 9.6 07/07/2021     BMP:    Lab Results   Component Value Date     07/07/2021    K 4.0 07/07/2021    K 4.9 12/29/2019     07/07/2021    CO2 23 07/07/2021    BUN 10 07/07/2021    LABALBU 3.9 12/29/2019    LABALBU 4.6 02/08/2011    CREATININE 0.6 07/07/2021    CALCIUM 9.7 07/07/2021    GFRAA >60 07/07/2021    LABGLOM >60 07/07/2021    GLUCOSE 135 07/07/2021    GLUCOSE 113 02/08/2011       Radiology Review:  ***    ASSESSMENT AND PLAN:    1. Patient is a 45 y.o. female with above specified procedure planned *** {ANESTHESIA UITS:749402260}    2. Procedure options, risks and benefits reviewed with patient. Patient expresses understanding and has signed consent form to proceed.     3.  Patient and family were provided with pre-op and post-op instructions, prescription medications, and any other supplied needed post op (slings, braces, etc.)    Controlled Substances Monitoring:            Farhana Weaver MD  Orthopaedic Surgery   7/7/21  2:44 PM

## 2021-07-07 NOTE — PROGRESS NOTES
Have you been tested for COVID  No         vaccinated  Have you been told you were positive for COVID No  Have you had any known exposure to someone that is positive for COVID No  Do you have a cough                   No              Do you have shortness of breath No                 Do you have a sore throat            No                Are you having chills                    No                Are you having muscle aches. No                    Please come to the hospital wearing a mask and have your significant other wear a mask as well. Both of you should check your temperature before leaving to come here,  if it is 100 or higher please call [phone] for instruction.

## 2021-07-07 NOTE — PROGRESS NOTES
I met with Morgan Lawrence this am for an orthopaedic education class. We discussed information regarding pre, intra, post-op, discharge, and LOS expectations. I presented the Total Knee video, and provided ortho education materials. I encouraged the patient to call with any questions or concerns.

## 2021-07-07 NOTE — PROGRESS NOTES
[facility] PRE-ADMISSION TESTING INSTRUCTIONS    Please come to the hospital wearing a mask and have your significant other wear a mask as well. Both of you should check your temperature before leaving to come here,  if it is 100 or higher please call [phone], preop area opens at 0530 a.m.,  for instruction. The Preadmission Testing patient is instructed accordingly using the following criteria (check applicable):    ARRIVAL INSTRUCTIONS:  [x] Parking the day of Surgery is located in the Main Entrance lot. Upon entering the door, make an immediate right to the surgery reception desk    [] Bring photo ID and insurance card    [] Bring in a copy of Living will or Durable Power of  papers.     [x] Please be sure to arrange for responsible adult to provide transportation to and from the hospital    [] Please arrange for responsible adult to be with you for the 24 hour period post procedure due to having anesthesia      GENERAL INSTRUCTIONS:    [x] Nothing by mouth after midnight, including gum, candy, mints or water    [x] You may brush your teeth, but do not swallow any water    [x] Take medications as instructed with 1-2 oz of water    [x] Stop herbal supplements and vitamins 5 days prior to procedure    [x] Follow preop dosing of blood thinners per physician instructions    [] Take 1/2 dose of evening insulin, but no insulin after midnight    [] No oral diabetic medications after midnight    [] If diabetic and have low blood sugar or feel symptomatic, take 1-2oz apple juice only    [x] Bring inhalers day of surgery    [] Bring C-PAP/ Bi-Pap day of surgery    [x] Bring urine specimen day of surgery    [] Shower or bath with soap, lather and rinse well, AM of Surgery, no lotion, powders or creams to surgical site    [] Follow bowel prep as instructed per surgeon    [x] No tobacco products within 24 hours of surgery     [x] No alcohol or illegal drug use within 24 hours of surgery.     [x] Jewelry, body piercing's, eyeglasses, contact lenses and dentures are not permitted into surgery (bring cases)      [x] Please do not wear any nail polish, make up or hair products on the day of surgery    [x] You can expect a call the business day prior to procedure to notify you if your arrival time changes    [x] If you receive a survey after surgery we would greatly appreciate your comments    [] Parent/guardian of a minor must accompany their child and remain on the premises  the entire time they are under our care     [] Pediatric patients may bring favorite toy, blanket or comfort item with them    [] A caregiver or family member must remain with the patient during their stay if they are mentally handicapped, have dementia, disoriented or unable to use a call light or would be a safety concern if left unattended    [x] Please notify surgeon if you develop any illness between now and time of surgery (cold, cough, sore throat, fever, nausea, vomiting) or any signs of infections  including skin, wounds, and dental.    [x]  The Outpatient Pharmacy is available to fill your prescription here on your day of surgery, ask your preop nurse for details    [] Other instructions    EDUCATIONAL MATERIALS PROVIDED:    [x] PAT Preoperative Education Packet/Booklet     [x] Medication List    [] Transfusion bracelet applied with instructions    [x] Shower with soap, lather and rinse well, and use CHG wipes provided the evening before surgery as instructed    [x] Incentive spirometer with instructions

## 2021-07-08 LAB — MRSA CULTURE ONLY: NORMAL

## 2021-07-12 ENCOUNTER — HOSPITAL ENCOUNTER (OUTPATIENT)
Age: 51
Setting detail: OBSERVATION
Discharge: HOME | End: 2021-07-13
Attending: ORTHOPAEDIC SURGERY | Admitting: ORTHOPAEDIC SURGERY
Payer: COMMERCIAL

## 2021-07-12 ENCOUNTER — ANESTHESIA (OUTPATIENT)
Dept: OPERATING ROOM | Age: 51
End: 2021-07-12
Payer: COMMERCIAL

## 2021-07-12 ENCOUNTER — APPOINTMENT (OUTPATIENT)
Dept: GENERAL RADIOLOGY | Age: 51
End: 2021-07-12
Attending: ORTHOPAEDIC SURGERY
Payer: COMMERCIAL

## 2021-07-12 VITALS
DIASTOLIC BLOOD PRESSURE: 82 MMHG | TEMPERATURE: 66.7 F | RESPIRATION RATE: 24 BRPM | OXYGEN SATURATION: 99 % | SYSTOLIC BLOOD PRESSURE: 153 MMHG

## 2021-07-12 DIAGNOSIS — Z01.818 PREOP TESTING: ICD-10-CM

## 2021-07-12 DIAGNOSIS — Z96.651 STATUS POST TOTAL KNEE REPLACEMENT, RIGHT: Primary | ICD-10-CM

## 2021-07-12 DIAGNOSIS — Z96.651 STATUS POST TOTAL RIGHT KNEE REPLACEMENT USING CEMENT: ICD-10-CM

## 2021-07-12 LAB — METER GLUCOSE: 136 MG/DL (ref 74–99)

## 2021-07-12 PROCEDURE — 3700000000 HC ANESTHESIA ATTENDED CARE: Performed by: ORTHOPAEDIC SURGERY

## 2021-07-12 PROCEDURE — 2580000003 HC RX 258: Performed by: NURSE PRACTITIONER

## 2021-07-12 PROCEDURE — 2580000003 HC RX 258: Performed by: ORTHOPAEDIC SURGERY

## 2021-07-12 PROCEDURE — 7100000000 HC PACU RECOVERY - FIRST 15 MIN: Performed by: ORTHOPAEDIC SURGERY

## 2021-07-12 PROCEDURE — 94664 DEMO&/EVAL PT USE INHALER: CPT

## 2021-07-12 PROCEDURE — 6370000000 HC RX 637 (ALT 250 FOR IP): Performed by: ANESTHESIOLOGY

## 2021-07-12 PROCEDURE — 6360000002 HC RX W HCPCS: Performed by: INTERNAL MEDICINE

## 2021-07-12 PROCEDURE — 2500000003 HC RX 250 WO HCPCS

## 2021-07-12 PROCEDURE — G0378 HOSPITAL OBSERVATION PER HR: HCPCS

## 2021-07-12 PROCEDURE — 3600000015 HC SURGERY LEVEL 5 ADDTL 15MIN: Performed by: ORTHOPAEDIC SURGERY

## 2021-07-12 PROCEDURE — 6370000000 HC RX 637 (ALT 250 FOR IP)

## 2021-07-12 PROCEDURE — 2709999900 HC NON-CHARGEABLE SUPPLY: Performed by: ORTHOPAEDIC SURGERY

## 2021-07-12 PROCEDURE — 6360000002 HC RX W HCPCS: Performed by: NURSE PRACTITIONER

## 2021-07-12 PROCEDURE — 6370000000 HC RX 637 (ALT 250 FOR IP): Performed by: INTERNAL MEDICINE

## 2021-07-12 PROCEDURE — 7100000001 HC PACU RECOVERY - ADDTL 15 MIN: Performed by: ORTHOPAEDIC SURGERY

## 2021-07-12 PROCEDURE — 97161 PT EVAL LOW COMPLEX 20 MIN: CPT

## 2021-07-12 PROCEDURE — 3600000005 HC SURGERY LEVEL 5 BASE: Performed by: ORTHOPAEDIC SURGERY

## 2021-07-12 PROCEDURE — 2500000003 HC RX 250 WO HCPCS: Performed by: ORTHOPAEDIC SURGERY

## 2021-07-12 PROCEDURE — 99024 POSTOP FOLLOW-UP VISIT: CPT | Performed by: NURSE PRACTITIONER

## 2021-07-12 PROCEDURE — 3700000001 HC ADD 15 MINUTES (ANESTHESIA): Performed by: ORTHOPAEDIC SURGERY

## 2021-07-12 PROCEDURE — 76942 ECHO GUIDE FOR BIOPSY: CPT | Performed by: ANESTHESIOLOGY

## 2021-07-12 PROCEDURE — 97165 OT EVAL LOW COMPLEX 30 MIN: CPT

## 2021-07-12 PROCEDURE — 64447 NJX AA&/STRD FEMORAL NRV IMG: CPT | Performed by: ANESTHESIOLOGY

## 2021-07-12 PROCEDURE — 27447 TOTAL KNEE ARTHROPLASTY: CPT | Performed by: ORTHOPAEDIC SURGERY

## 2021-07-12 PROCEDURE — 2720000010 HC SURG SUPPLY STERILE: Performed by: ORTHOPAEDIC SURGERY

## 2021-07-12 PROCEDURE — 20985 CPTR-ASST DIR MS PX: CPT | Performed by: ORTHOPAEDIC SURGERY

## 2021-07-12 PROCEDURE — 6360000002 HC RX W HCPCS: Performed by: ORTHOPAEDIC SURGERY

## 2021-07-12 PROCEDURE — 2700000000 HC OXYGEN THERAPY PER DAY

## 2021-07-12 PROCEDURE — 6370000000 HC RX 637 (ALT 250 FOR IP): Performed by: NURSE PRACTITIONER

## 2021-07-12 PROCEDURE — C1713 ANCHOR/SCREW BN/BN,TIS/BN: HCPCS | Performed by: ORTHOPAEDIC SURGERY

## 2021-07-12 PROCEDURE — 6360000002 HC RX W HCPCS: Performed by: NURSE ANESTHETIST, CERTIFIED REGISTERED

## 2021-07-12 PROCEDURE — 88305 TISSUE EXAM BY PATHOLOGIST: CPT

## 2021-07-12 PROCEDURE — 6360000002 HC RX W HCPCS: Performed by: ANESTHESIOLOGY

## 2021-07-12 PROCEDURE — 6360000002 HC RX W HCPCS

## 2021-07-12 PROCEDURE — 88311 DECALCIFY TISSUE: CPT

## 2021-07-12 PROCEDURE — 82962 GLUCOSE BLOOD TEST: CPT

## 2021-07-12 PROCEDURE — 73560 X-RAY EXAM OF KNEE 1 OR 2: CPT

## 2021-07-12 PROCEDURE — 2500000003 HC RX 250 WO HCPCS: Performed by: NURSE ANESTHETIST, CERTIFIED REGISTERED

## 2021-07-12 PROCEDURE — 94640 AIRWAY INHALATION TREATMENT: CPT

## 2021-07-12 PROCEDURE — C1776 JOINT DEVICE (IMPLANTABLE): HCPCS | Performed by: ORTHOPAEDIC SURGERY

## 2021-07-12 DEVICE — IMPLANTABLE DEVICE: Type: IMPLANTABLE DEVICE | Site: KNEE | Status: FUNCTIONAL

## 2021-07-12 DEVICE — BASEPLATE TIB SZ 3 AP44MM ML67MM KNEE TRITANIUM 4 CRUCFRM: Type: IMPLANTABLE DEVICE | Site: KNEE | Status: FUNCTIONAL

## 2021-07-12 DEVICE — COMPONENT PAT DIA29MM THK9MM SUP INFERIOR KNEE TRITANIUM: Type: IMPLANTABLE DEVICE | Site: KNEE | Status: FUNCTIONAL

## 2021-07-12 RX ORDER — SODIUM CHLORIDE 0.9 % (FLUSH) 0.9 %
10 SYRINGE (ML) INJECTION PRN
Status: DISCONTINUED | OUTPATIENT
Start: 2021-07-12 | End: 2021-07-13 | Stop reason: HOSPADM

## 2021-07-12 RX ORDER — CETIRIZINE HYDROCHLORIDE 10 MG/1
10 TABLET ORAL DAILY
Status: DISCONTINUED | OUTPATIENT
Start: 2021-07-12 | End: 2021-07-13 | Stop reason: HOSPADM

## 2021-07-12 RX ORDER — SENNA AND DOCUSATE SODIUM 50; 8.6 MG/1; MG/1
1 TABLET, FILM COATED ORAL 2 TIMES DAILY
Status: DISCONTINUED | OUTPATIENT
Start: 2021-07-12 | End: 2021-07-13 | Stop reason: HOSPADM

## 2021-07-12 RX ORDER — BUDESONIDE 0.5 MG/2ML
0.5 INHALANT ORAL 2 TIMES DAILY
Status: DISCONTINUED | OUTPATIENT
Start: 2021-07-12 | End: 2021-07-13 | Stop reason: HOSPADM

## 2021-07-12 RX ORDER — KETOROLAC TROMETHAMINE 30 MG/ML
15 INJECTION, SOLUTION INTRAMUSCULAR; INTRAVENOUS EVERY 6 HOURS
Status: DISCONTINUED | OUTPATIENT
Start: 2021-07-12 | End: 2021-07-13 | Stop reason: HOSPADM

## 2021-07-12 RX ORDER — FENTANYL CITRATE 50 UG/ML
100 INJECTION, SOLUTION INTRAMUSCULAR; INTRAVENOUS ONCE
Status: COMPLETED | OUTPATIENT
Start: 2021-07-12 | End: 2021-07-12

## 2021-07-12 RX ORDER — ONDANSETRON 4 MG/1
4 TABLET, ORALLY DISINTEGRATING ORAL EVERY 8 HOURS PRN
Status: DISCONTINUED | OUTPATIENT
Start: 2021-07-12 | End: 2021-07-13 | Stop reason: HOSPADM

## 2021-07-12 RX ORDER — LIDOCAINE HYDROCHLORIDE 10 MG/ML
10 INJECTION, SOLUTION INFILTRATION; PERINEURAL
Status: COMPLETED | OUTPATIENT
Start: 2021-07-12 | End: 2021-07-12

## 2021-07-12 RX ORDER — ALBUTEROL SULFATE 90 UG/1
2 AEROSOL, METERED RESPIRATORY (INHALATION) EVERY 6 HOURS PRN
Status: DISCONTINUED | OUTPATIENT
Start: 2021-07-12 | End: 2021-07-12 | Stop reason: CLARIF

## 2021-07-12 RX ORDER — ACETAMINOPHEN 500 MG
TABLET ORAL
Status: COMPLETED
Start: 2021-07-12 | End: 2021-07-12

## 2021-07-12 RX ORDER — PANTOPRAZOLE SODIUM 40 MG/1
40 TABLET, DELAYED RELEASE ORAL
Status: DISCONTINUED | OUTPATIENT
Start: 2021-07-13 | End: 2021-07-13 | Stop reason: HOSPADM

## 2021-07-12 RX ORDER — ONDANSETRON 2 MG/ML
INJECTION INTRAMUSCULAR; INTRAVENOUS PRN
Status: DISCONTINUED | OUTPATIENT
Start: 2021-07-12 | End: 2021-07-12 | Stop reason: SDUPTHER

## 2021-07-12 RX ORDER — GABAPENTIN 300 MG/1
600 CAPSULE ORAL ONCE
Status: COMPLETED | OUTPATIENT
Start: 2021-07-12 | End: 2021-07-12

## 2021-07-12 RX ORDER — FENTANYL CITRATE 50 UG/ML
INJECTION, SOLUTION INTRAMUSCULAR; INTRAVENOUS
Status: DISPENSED
Start: 2021-07-12 | End: 2021-07-12

## 2021-07-12 RX ORDER — ARFORMOTEROL TARTRATE 15 UG/2ML
15 SOLUTION RESPIRATORY (INHALATION) 2 TIMES DAILY
Status: DISCONTINUED | OUTPATIENT
Start: 2021-07-12 | End: 2021-07-13 | Stop reason: HOSPADM

## 2021-07-12 RX ORDER — DEXAMETHASONE SODIUM PHOSPHATE 10 MG/ML
4 INJECTION, SOLUTION INTRAMUSCULAR; INTRAVENOUS ONCE
Status: DISCONTINUED | OUTPATIENT
Start: 2021-07-12 | End: 2021-07-12 | Stop reason: HOSPADM

## 2021-07-12 RX ORDER — MEPERIDINE HYDROCHLORIDE 25 MG/ML
12.5 INJECTION INTRAMUSCULAR; INTRAVENOUS; SUBCUTANEOUS EVERY 5 MIN PRN
Status: DISCONTINUED | OUTPATIENT
Start: 2021-07-12 | End: 2021-07-12 | Stop reason: HOSPADM

## 2021-07-12 RX ORDER — ONDANSETRON 2 MG/ML
4 INJECTION INTRAMUSCULAR; INTRAVENOUS
Status: DISCONTINUED | OUTPATIENT
Start: 2021-07-12 | End: 2021-07-12 | Stop reason: HOSPADM

## 2021-07-12 RX ORDER — CELECOXIB 100 MG/1
CAPSULE ORAL
Status: DISCONTINUED
Start: 2021-07-12 | End: 2021-07-12 | Stop reason: WASHOUT

## 2021-07-12 RX ORDER — IPRATROPIUM BROMIDE AND ALBUTEROL SULFATE 2.5; .5 MG/3ML; MG/3ML
1 SOLUTION RESPIRATORY (INHALATION) ONCE
Status: COMPLETED | OUTPATIENT
Start: 2021-07-12 | End: 2021-07-12

## 2021-07-12 RX ORDER — ROPIVACAINE HYDROCHLORIDE 5 MG/ML
30 INJECTION, SOLUTION EPIDURAL; INFILTRATION; PERINEURAL
Status: DISCONTINUED | OUTPATIENT
Start: 2021-07-12 | End: 2021-07-12

## 2021-07-12 RX ORDER — DEXAMETHASONE SODIUM PHOSPHATE 4 MG/ML
INJECTION, SOLUTION INTRA-ARTICULAR; INTRALESIONAL; INTRAMUSCULAR; INTRAVENOUS; SOFT TISSUE PRN
Status: DISCONTINUED | OUTPATIENT
Start: 2021-07-12 | End: 2021-07-12 | Stop reason: SDUPTHER

## 2021-07-12 RX ORDER — FENTANYL CITRATE 50 UG/ML
25 INJECTION, SOLUTION INTRAMUSCULAR; INTRAVENOUS EVERY 5 MIN PRN
Status: DISCONTINUED | OUTPATIENT
Start: 2021-07-12 | End: 2021-07-12 | Stop reason: HOSPADM

## 2021-07-12 RX ORDER — SODIUM CHLORIDE 9 MG/ML
25 INJECTION, SOLUTION INTRAVENOUS PRN
Status: DISCONTINUED | OUTPATIENT
Start: 2021-07-12 | End: 2021-07-12 | Stop reason: HOSPADM

## 2021-07-12 RX ORDER — MIDAZOLAM HYDROCHLORIDE 2 MG/2ML
1 INJECTION, SOLUTION INTRAMUSCULAR; INTRAVENOUS EVERY 5 MIN PRN
Status: DISCONTINUED | OUTPATIENT
Start: 2021-07-12 | End: 2021-07-12 | Stop reason: HOSPADM

## 2021-07-12 RX ORDER — PHENYLEPHRINE HCL IN 0.9% NACL 1 MG/10 ML
SYRINGE (ML) INTRAVENOUS PRN
Status: DISCONTINUED | OUTPATIENT
Start: 2021-07-12 | End: 2021-07-12 | Stop reason: SDUPTHER

## 2021-07-12 RX ORDER — SODIUM CHLORIDE 9 MG/ML
25 INJECTION, SOLUTION INTRAVENOUS PRN
Status: DISCONTINUED | OUTPATIENT
Start: 2021-07-12 | End: 2021-07-13 | Stop reason: HOSPADM

## 2021-07-12 RX ORDER — PROPOFOL 10 MG/ML
INJECTION, EMULSION INTRAVENOUS CONTINUOUS PRN
Status: DISCONTINUED | OUTPATIENT
Start: 2021-07-12 | End: 2021-07-12 | Stop reason: SDUPTHER

## 2021-07-12 RX ORDER — ROPIVACAINE HYDROCHLORIDE 5 MG/ML
INJECTION, SOLUTION EPIDURAL; INFILTRATION; PERINEURAL
Status: COMPLETED
Start: 2021-07-12 | End: 2021-07-12

## 2021-07-12 RX ORDER — GUAIFENESIN 400 MG/1
400 TABLET ORAL 3 TIMES DAILY
Status: DISCONTINUED | OUTPATIENT
Start: 2021-07-12 | End: 2021-07-13 | Stop reason: HOSPADM

## 2021-07-12 RX ORDER — ROPIVACAINE HYDROCHLORIDE 5 MG/ML
45 INJECTION, SOLUTION EPIDURAL; INFILTRATION; PERINEURAL ONCE
Status: COMPLETED | OUTPATIENT
Start: 2021-07-12 | End: 2021-07-12

## 2021-07-12 RX ORDER — PREDNISONE 1 MG/1
10 TABLET ORAL DAILY
Status: DISCONTINUED | OUTPATIENT
Start: 2021-07-12 | End: 2021-07-13 | Stop reason: SDUPTHER

## 2021-07-12 RX ORDER — ALBUTEROL SULFATE 2.5 MG/3ML
2.5 SOLUTION RESPIRATORY (INHALATION) EVERY 4 HOURS PRN
Status: DISCONTINUED | OUTPATIENT
Start: 2021-07-12 | End: 2021-07-13 | Stop reason: HOSPADM

## 2021-07-12 RX ORDER — MIDAZOLAM HYDROCHLORIDE 1 MG/ML
INJECTION INTRAMUSCULAR; INTRAVENOUS
Status: COMPLETED
Start: 2021-07-12 | End: 2021-07-12

## 2021-07-12 RX ORDER — OXYCODONE HYDROCHLORIDE 5 MG/1
5 TABLET ORAL EVERY 4 HOURS PRN
Status: DISCONTINUED | OUTPATIENT
Start: 2021-07-12 | End: 2021-07-13 | Stop reason: HOSPADM

## 2021-07-12 RX ORDER — LIDOCAINE HYDROCHLORIDE 20 MG/ML
INJECTION, SOLUTION EPIDURAL; INFILTRATION; INTRACAUDAL; PERINEURAL PRN
Status: DISCONTINUED | OUTPATIENT
Start: 2021-07-12 | End: 2021-07-12 | Stop reason: SDUPTHER

## 2021-07-12 RX ORDER — ASPIRIN 81 MG/1
81 TABLET ORAL 2 TIMES DAILY
Status: DISCONTINUED | OUTPATIENT
Start: 2021-07-12 | End: 2021-07-13 | Stop reason: HOSPADM

## 2021-07-12 RX ORDER — FENTANYL CITRATE 50 UG/ML
INJECTION, SOLUTION INTRAMUSCULAR; INTRAVENOUS PRN
Status: DISCONTINUED | OUTPATIENT
Start: 2021-07-12 | End: 2021-07-12 | Stop reason: SDUPTHER

## 2021-07-12 RX ORDER — VANCOMYCIN HYDROCHLORIDE 1 G/20ML
INJECTION, POWDER, LYOPHILIZED, FOR SOLUTION INTRAVENOUS PRN
Status: DISCONTINUED | OUTPATIENT
Start: 2021-07-12 | End: 2021-07-12 | Stop reason: ALTCHOICE

## 2021-07-12 RX ORDER — GABAPENTIN 300 MG/1
CAPSULE ORAL
Status: COMPLETED
Start: 2021-07-12 | End: 2021-07-12

## 2021-07-12 RX ORDER — ACETAMINOPHEN 325 MG/1
650 TABLET ORAL EVERY 6 HOURS
Status: DISCONTINUED | OUTPATIENT
Start: 2021-07-12 | End: 2021-07-13 | Stop reason: HOSPADM

## 2021-07-12 RX ORDER — SODIUM CHLORIDE 0.9 % (FLUSH) 0.9 %
10 SYRINGE (ML) INJECTION EVERY 12 HOURS SCHEDULED
Status: DISCONTINUED | OUTPATIENT
Start: 2021-07-12 | End: 2021-07-12 | Stop reason: HOSPADM

## 2021-07-12 RX ORDER — SODIUM CHLORIDE 0.9 % (FLUSH) 0.9 %
10 SYRINGE (ML) INJECTION EVERY 12 HOURS SCHEDULED
Status: DISCONTINUED | OUTPATIENT
Start: 2021-07-12 | End: 2021-07-13 | Stop reason: HOSPADM

## 2021-07-12 RX ORDER — ONDANSETRON 2 MG/ML
4 INJECTION INTRAMUSCULAR; INTRAVENOUS EVERY 6 HOURS PRN
Status: DISCONTINUED | OUTPATIENT
Start: 2021-07-12 | End: 2021-07-13 | Stop reason: HOSPADM

## 2021-07-12 RX ORDER — CELECOXIB 100 MG/1
200 CAPSULE ORAL ONCE
Status: DISCONTINUED | OUTPATIENT
Start: 2021-07-12 | End: 2021-07-12

## 2021-07-12 RX ORDER — ACETAMINOPHEN 500 MG
1000 TABLET ORAL ONCE
Status: COMPLETED | OUTPATIENT
Start: 2021-07-12 | End: 2021-07-12

## 2021-07-12 RX ORDER — SODIUM CHLORIDE 0.9 % (FLUSH) 0.9 %
10 SYRINGE (ML) INJECTION PRN
Status: DISCONTINUED | OUTPATIENT
Start: 2021-07-12 | End: 2021-07-12 | Stop reason: HOSPADM

## 2021-07-12 RX ORDER — LIDOCAINE HYDROCHLORIDE 10 MG/ML
INJECTION, SOLUTION INFILTRATION; PERINEURAL
Status: COMPLETED
Start: 2021-07-12 | End: 2021-07-12

## 2021-07-12 RX ORDER — ALBUTEROL SULFATE 2.5 MG/3ML
2.5 SOLUTION RESPIRATORY (INHALATION) EVERY 6 HOURS PRN
Status: DISCONTINUED | OUTPATIENT
Start: 2021-07-12 | End: 2021-07-12 | Stop reason: SDUPTHER

## 2021-07-12 RX ORDER — OXYCODONE HYDROCHLORIDE 5 MG/1
10 TABLET ORAL EVERY 4 HOURS PRN
Status: DISCONTINUED | OUTPATIENT
Start: 2021-07-12 | End: 2021-07-13 | Stop reason: HOSPADM

## 2021-07-12 RX ORDER — DEXAMETHASONE SODIUM PHOSPHATE 10 MG/ML
8 INJECTION, SOLUTION INTRAMUSCULAR; INTRAVENOUS ONCE
Status: DISCONTINUED | OUTPATIENT
Start: 2021-07-12 | End: 2021-07-12 | Stop reason: HOSPADM

## 2021-07-12 RX ADMIN — CETIRIZINE HYDROCHLORIDE 10 MG: 10 TABLET, FILM COATED ORAL at 15:02

## 2021-07-12 RX ADMIN — KETOROLAC TROMETHAMINE 15 MG: 30 INJECTION, SOLUTION INTRAMUSCULAR; INTRAVENOUS at 18:37

## 2021-07-12 RX ADMIN — IPRATROPIUM BROMIDE AND ALBUTEROL SULFATE 1 AMPULE: .5; 3 SOLUTION RESPIRATORY (INHALATION) at 09:47

## 2021-07-12 RX ADMIN — MIDAZOLAM HYDROCHLORIDE 2 MG: 2 INJECTION, SOLUTION INTRAMUSCULAR; INTRAVENOUS at 06:32

## 2021-07-12 RX ADMIN — TRANEXAMIC ACID 1000 MG: 1 INJECTION, SOLUTION INTRAVENOUS at 10:06

## 2021-07-12 RX ADMIN — ACETAMINOPHEN 650 MG: 325 TABLET ORAL at 18:36

## 2021-07-12 RX ADMIN — SODIUM CHLORIDE: 9 INJECTION, SOLUTION INTRAVENOUS at 06:56

## 2021-07-12 RX ADMIN — KETOROLAC TROMETHAMINE 15 MG: 30 INJECTION, SOLUTION INTRAMUSCULAR; INTRAVENOUS at 23:35

## 2021-07-12 RX ADMIN — Medication 1000 MG: at 06:11

## 2021-07-12 RX ADMIN — SODIUM CHLORIDE, PRESERVATIVE FREE 10 ML: 5 INJECTION INTRAVENOUS at 14:24

## 2021-07-12 RX ADMIN — GABAPENTIN 600 MG: 300 CAPSULE ORAL at 06:10

## 2021-07-12 RX ADMIN — Medication 2000 MG: at 14:30

## 2021-07-12 RX ADMIN — LIDOCAINE HYDROCHLORIDE 10 ML: 10 INJECTION, SOLUTION INFILTRATION; PERINEURAL at 06:35

## 2021-07-12 RX ADMIN — MIDAZOLAM HYDROCHLORIDE 2 MG: 1 INJECTION, SOLUTION INTRAMUSCULAR; INTRAVENOUS at 06:32

## 2021-07-12 RX ADMIN — DEXAMETHASONE SODIUM PHOSPHATE 8 MG: 4 INJECTION, SOLUTION INTRAMUSCULAR; INTRAVENOUS at 07:42

## 2021-07-12 RX ADMIN — DOCUSATE SODIUM 50 MG AND SENNOSIDES 8.6 MG 1 TABLET: 8.6; 5 TABLET, FILM COATED ORAL at 11:57

## 2021-07-12 RX ADMIN — SODIUM CHLORIDE, PRESERVATIVE FREE 10 ML: 5 INJECTION INTRAVENOUS at 11:58

## 2021-07-12 RX ADMIN — ASPIRIN 81 MG: 81 TABLET, COATED ORAL at 11:57

## 2021-07-12 RX ADMIN — LIDOCAINE HYDROCHLORIDE 60 MG: 20 INJECTION, SOLUTION EPIDURAL; INFILTRATION; INTRACAUDAL; PERINEURAL at 07:11

## 2021-07-12 RX ADMIN — FENTANYL CITRATE 25 MCG: 50 INJECTION, SOLUTION INTRAMUSCULAR; INTRAVENOUS at 07:09

## 2021-07-12 RX ADMIN — PROPOFOL 80 MCG/KG/MIN: 10 INJECTION, EMULSION INTRAVENOUS at 07:11

## 2021-07-12 RX ADMIN — GUAIFENESIN 400 MG: 400 TABLET ORAL at 20:40

## 2021-07-12 RX ADMIN — GUAIFENESIN 400 MG: 400 TABLET ORAL at 15:02

## 2021-07-12 RX ADMIN — TRANEXAMIC ACID 1000 MG: 1 INJECTION, SOLUTION INTRAVENOUS at 07:06

## 2021-07-12 RX ADMIN — KETOROLAC TROMETHAMINE 15 MG: 30 INJECTION, SOLUTION INTRAMUSCULAR; INTRAVENOUS at 11:57

## 2021-07-12 RX ADMIN — Medication 50 MCG: at 07:35

## 2021-07-12 RX ADMIN — Medication 50 MCG: at 07:18

## 2021-07-12 RX ADMIN — FENTANYL CITRATE 50 MCG: 50 INJECTION INTRAMUSCULAR; INTRAVENOUS at 06:32

## 2021-07-12 RX ADMIN — ACETAMINOPHEN 650 MG: 325 TABLET ORAL at 23:36

## 2021-07-12 RX ADMIN — IPRATROPIUM BROMIDE 0.5 MG: 0.5 SOLUTION RESPIRATORY (INHALATION) at 17:17

## 2021-07-12 RX ADMIN — SODIUM CHLORIDE, PRESERVATIVE FREE 10 ML: 5 INJECTION INTRAVENOUS at 20:41

## 2021-07-12 RX ADMIN — Medication 2000 MG: at 06:57

## 2021-07-12 RX ADMIN — METOPROLOL TARTRATE 12.5 MG: 25 TABLET, FILM COATED ORAL at 20:40

## 2021-07-12 RX ADMIN — FENTANYL CITRATE 25 MCG: 50 INJECTION, SOLUTION INTRAMUSCULAR; INTRAVENOUS at 09:17

## 2021-07-12 RX ADMIN — ROPIVACAINE HYDROCHLORIDE 45 ML: 5 INJECTION, SOLUTION EPIDURAL; INFILTRATION; PERINEURAL at 06:36

## 2021-07-12 RX ADMIN — ACETAMINOPHEN 650 MG: 325 TABLET ORAL at 11:57

## 2021-07-12 RX ADMIN — ONDANSETRON 4 MG: 2 INJECTION INTRAMUSCULAR; INTRAVENOUS at 07:39

## 2021-07-12 RX ADMIN — ASPIRIN 81 MG: 81 TABLET, COATED ORAL at 20:40

## 2021-07-12 RX ADMIN — SODIUM CHLORIDE: 9 INJECTION, SOLUTION INTRAVENOUS at 08:11

## 2021-07-12 RX ADMIN — IPRATROPIUM BROMIDE 0.5 MG: 0.5 SOLUTION RESPIRATORY (INHALATION) at 21:26

## 2021-07-12 RX ADMIN — OXYCODONE 10 MG: 5 TABLET ORAL at 14:24

## 2021-07-12 RX ADMIN — OXYCODONE 10 MG: 5 TABLET ORAL at 18:37

## 2021-07-12 RX ADMIN — Medication 2000 MG: at 23:36

## 2021-07-12 RX ADMIN — DOCUSATE SODIUM 50 MG AND SENNOSIDES 8.6 MG 1 TABLET: 8.6; 5 TABLET, FILM COATED ORAL at 20:39

## 2021-07-12 RX ADMIN — ACETAMINOPHEN 1000 MG: 500 TABLET ORAL at 06:11

## 2021-07-12 RX ADMIN — Medication 50 MCG: at 07:19

## 2021-07-12 RX ADMIN — Medication 50 MCG: at 07:26

## 2021-07-12 ASSESSMENT — PAIN DESCRIPTION - PROGRESSION
CLINICAL_PROGRESSION: NOT CHANGED

## 2021-07-12 ASSESSMENT — PULMONARY FUNCTION TESTS
PIF_VALUE: 1

## 2021-07-12 ASSESSMENT — PAIN SCALES - GENERAL
PAINLEVEL_OUTOF10: 0
PAINLEVEL_OUTOF10: 2
PAINLEVEL_OUTOF10: 8
PAINLEVEL_OUTOF10: 0
PAINLEVEL_OUTOF10: 4
PAINLEVEL_OUTOF10: 0
PAINLEVEL_OUTOF10: 3
PAINLEVEL_OUTOF10: 0
PAINLEVEL_OUTOF10: 8
PAINLEVEL_OUTOF10: 5
PAINLEVEL_OUTOF10: 0

## 2021-07-12 ASSESSMENT — PAIN - FUNCTIONAL ASSESSMENT
PAIN_FUNCTIONAL_ASSESSMENT: PREVENTS OR INTERFERES SOME ACTIVE ACTIVITIES AND ADLS
PAIN_FUNCTIONAL_ASSESSMENT: ACTIVITIES ARE NOT PREVENTED
PAIN_FUNCTIONAL_ASSESSMENT: ACTIVITIES ARE NOT PREVENTED

## 2021-07-12 ASSESSMENT — PAIN DESCRIPTION - DESCRIPTORS
DESCRIPTORS: ACHING;DISCOMFORT

## 2021-07-12 ASSESSMENT — PAIN DESCRIPTION - FREQUENCY
FREQUENCY: CONTINUOUS

## 2021-07-12 ASSESSMENT — PAIN DESCRIPTION - PAIN TYPE
TYPE: SURGICAL PAIN

## 2021-07-12 ASSESSMENT — PAIN DESCRIPTION - LOCATION
LOCATION: KNEE

## 2021-07-12 ASSESSMENT — PAIN DESCRIPTION - ORIENTATION
ORIENTATION: RIGHT

## 2021-07-12 ASSESSMENT — PAIN DESCRIPTION - ONSET
ONSET: ON-GOING

## 2021-07-12 NOTE — ANESTHESIA POSTPROCEDURE EVALUATION
Department of Anesthesiology  Postprocedure Note    Patient: Rebecca Brown  MRN: [id number]  YOB: 1970  Date of evaluation: 7/12/2021  Time:  7:25 PM     Procedure Summary     Date: 07/12/21 Room / Location: [facility] OR 04 / [facility]    Anesthesia Start: 5182 Anesthesia Stop: 6205    Procedure: RIGHT KNEE JOHNNIE ROBOTIC ASSISTED TOTAL ARTHROPLASTY  PNB (Right Knee) Diagnosis: (OSTEOARTHRITIS)    Surgeons: Pricila Ontiveros MD Responsible Provider: Madhu Trinh MD    Anesthesia Type: regional, spinal ASA Status: 3          Anesthesia Type: regional, spinal    Theodore Phase I: Theodore Score: 9    Theodore Phase II:      Last vitals: Reviewed and per EMR flowsheets.        Anesthesia Post Evaluation    Patient location during evaluation: PACU  Patient participation: complete - patient participated  Level of consciousness: awake and alert  Airway patency: patent  Nausea & Vomiting: no nausea and no vomiting  Complications: no  Cardiovascular status: hemodynamically stable  Respiratory status: acceptable  Hydration status: stable

## 2021-07-12 NOTE — ANESTHESIA PROCEDURE NOTES
Peripheral Block    Patient location during procedure: PACU  Start time: 7/12/2021 6:30 AM  End time: 7/12/2021 6:38 AM  Staffing  Performed: anesthesiologist   Anesthesiologist: Alee Irwin MD  Preanesthetic Checklist  Completed: patient identified, IV checked, site marked, risks and benefits discussed, surgical consent, monitors and equipment checked, pre-op evaluation, timeout performed, anesthesia consent given, oxygen available and patient being monitored  Peripheral Block  Patient position: supine  Prep: ChloraPrep  Patient monitoring: cardiac monitor, continuous pulse ox, frequent blood pressure checks and IV access  Block type: Saphenous  Laterality: right  Injection technique: single-shot  Guidance: ultrasound guided  Local infiltration: lidocaine and ropivacaine  Infiltration strength: 1 %  Dose: 2 mL  Provider prep: mask and sterile gloves  Local infiltration: lidocaine and ropivacaine  Needle  Needle type: combined needle/nerve stimulator   Needle gauge: 20 G  Needle length: 10 cm  Needle localization: ultrasound guidance  Assessment  Injection assessment: negative aspiration for heme, no paresthesia on injection and local visualized surrounding nerve on ultrasound  Slow fractionated injection: yes  Hemodynamics: stable  Additional Notes  U/S [id number].  (1) Under ultrasound guidance, a 20 gauge needle was inserted and placed in close proximity to the right saphenous nerve.  (2) Ultrasound was also used to visualize the spread of the anesthetic in close proximity to the nerve being blocked. (3) The nerve appeared anatomically normal, and (4 there were no apparent abnormal pathological findings on the image that were readily visible and related to the nerve being blocked. (5) A permanent ultrasound image was saved in the patient's record.       Reason for block: post-op pain management and at surgeon's request

## 2021-07-12 NOTE — OP NOTE
OPERATIVE REPORT    PATIENT:  Paula Zeng  [id number]    DATE OF PROCEDURE:  7/12/21      SURGEON: Skye Richardson MD    ASSISTANT:  Suad Ferrara DO; Jessica Mclain CNP. CNP was necessary for positioning, prepping/draping, intra-operative assistance, and wound closure. His assistance expedited the procedure and decreased operating room time. PREOPERATIVE DIAGNOSIS: Degenerative joint disease , Right knee. POSTOPERATIVE DIAGNOSIS: Degenerative joint disease,  Rightknee. OPERATION: Jake Robot Assisted Right total knee arthroplasty     ANESTHESIA: . spinal and adductor block     OPERATIVE INDICATIONS:  The patient is a 46 YO femal with significant past medical history of COPD, hyperlipidemia, anxiety, asthma and back pain  who presents with right knee pain. Patient has been recalcitrant to multiple conservative treatments including PT, Injections, medications and activity modifications. She continues to have pain that affects her activities of daily living. She has history of psoriasis but does not have known joint involvement. For this reason she is interested in right knee total arthroplasty. Today we discussed the risks of surgery. Risks include but are not limited to infection, neurovascular injury, recurring postoperative pain, arthrofibrosis, failure of hardware, pulmonary embolism, need for revision, risks of general anesthesia and all other unforeseen outcomes. Patient verbalized understanding of the risks and wished to proceed    OPERATIVE FINDINGS:   There was extensive eburnation of bone, and full thickness cartilage loss over the femoral and tibial condyles medial as well as patellofemoral joint. OPERATIVE PROCEDURE: After satisfactory spinal anesthesia, the patient was placed supine on the operative table. A Bump was placed under the operative leg and a tourniquet was placed high on the operative thigh. The operative extremity was prepped and draped in sterile fashion. Prior to procedure start, a time out was performed including confirmation of operative site and operation to be performed. Antibiotic prophylaxis, 2 g ancef was given prior to incision, as was 1 g of transexamic acid. The leg was exsanguinated with an Esmarch bandage. The tourniquet was inflated. An anterior midline incision was made centered about the patella. Dissection was carried down in the midline of the extensor mechanism where a medial parapatellar arthrotomy was made. The patella was everted and a free hand method was used to cut the patella after removal of osteophytes. A patellar protector was placed and the patella was subluxed laterally. We then placed our femoral and tibial pins and assembled the array for the femur and tibia respectively. Femoral and tibial registration buttons were placed. Hip centering and identification of medial and lateral malleoli was performed. The knee was flexed. Appropriate points were registered on the femur followed by the tibia. All osteophytes were then removed. The knee was brought into extension. We noted baseline 9 degree flexion contracture and 11 degrees of varus. This was minimally correctable. We plan for 19 mm gaps. Appropriate tension was placed on the medial and lateral compartments with sizing spoons and we captured our measurements with correction of deformity. This was repeated with the knee in 90 degrees of flexion. Appropriate adjustments were then made utilizing the [address] to plan for 19 mm gaps in extension and flexion. We will able to get 17 medially in both flexion and extension and 19 laterally due to the significant contracture, we plan to address this with more soft tissue release. We then prepared to make our cuts. The knee was flexed. Medial and lateral retractors were placed. Utilizing the [facility] robot arm, we made femoral and tibial cuts. All bone fragments were removed.      A lamina  was placed in order to access the posterior aspect of the knee and remove osteophytes and remaining meniscal tissue. The knee was then flexed, and the appropriate size tibial tray was placed in the correct amount of external rotation. A size 3 proved to be best fit. This as pinned into place. The femoral trial was then placed, size 3, a 9mm trial polyethylene was placed on the tibial component, and the knee was reduced and taken through a range of motion. We noted that she had it still about a 8 degree flexion contracture, was a bit tight in flexion as well. She was slightly asymmetrically tight and medially both in flexion and extension as we had indicated with our precut measurements. We placed another half degree of varus in the tibia and also plan to take another millimeter. We brought in the saw and recut the tibia. Trials were then replaced and we noted that extension was to flashing 3 to 4 degrees flexion but clinically she appeared straight. We had excellent stability with varus and valgus stress. .  The knee was then flexed again, and the femur was drilled. The tibial tower was placed and the tibia was punched. The additional component for press-fit tibia was placed and holes were drilled. All trial components were then removed. We noted this point there was some osteophyte coming off with the PCL of the tibia but the PCL was grossly intact and this did not affect our posterior drawer. The knee was thoroughly irrigated. With the knee in extension, bovie electrocautery was utilized to coagulate in the posteromedial and posterolateral gutters. Local anesthetic consisting of 1% lidocaine with epi, 1% lidocaine without epi, and 0.25% Marcaine was then placed into the posterior capsule and into the periosteum of the femur and tibia, and into the anterior capsule. The bony surfaces were dried and the knee was flexed. The tibial component was then placed using a mallet.   There was an excellent press-fit and we were able to lift the knee to be up without evidence of loosening. The final polyethylene was impacted into place. The press-fit femoral component was then placed. The knee was then placed in extension. The patella was everted, sized, and drilled. Size 29 was appropriate. The press-fit component was placed and seated, and checked with a Tierra Hand and found to be stable. The tourniquet was released and bleeders were coagulated. A Betadine shock was performed for 3 minutes. We assessed range of motion and stability once again and noted full extension, flexion 140 degrees without significant tightness in good posterior drawer, as well as symmetric gaps on our CT model. Irrigated once more. 500 mg of vancomycin powder was placed in the joint. The joint capsule was then closed using an O stratafix. 2-O interrupted vicryl suture was used to close the subcutanseous tissue. Finally, a 4-O running subcuticular monocryl suture was used to closed skin. Tibial pin sites were closed with nylon. Dermabond was then placed over the incision. A sterile dressing was placed. The patient was then transferred to their hospital bed, awoken from anesthesia, and transported to the PACU in stable condition. IMPLANTS:   Implant Name Type Inv.  Item Serial No.  Lot No. LRB No. Used Action   COMPONENT PAT [id number] THK9MM SUP INFERIOR KNEE TRITANIUM  COMPONENT PAT [id number] THK9MM SUP INFERIOR KNEE TRITANIUM  [employer] [facility] [id number] Right 1 Implanted   INSERT TIB BEAR SZ 3 THK9MM KNEE X3 CRUCE RET TRIATHLON  INSERT TIB BEAR SZ 3 THK9MM KNEE X3 CRUCE RET TRIATHLON  [employer] [facility] [employer] [id number] Right 1 Implanted   COMPONENT FEM SZ 3 R KNEE CRUCE RET CEMENTLESS BEAD W/ MARTHA  COMPONENT FEM SZ 3 R KNEE CRUCE RET CEMENTLESS BEAD W/ MARTHA  [employer] (Former[employer] [facility] [id number] Right 1 Implanted   BASEPLATE TIB SZ 3 [id number] ML67MM KNEE TRITANIUM 4 CRUCFRM  BASEPLATE TIB SZ 3 [id number] ML67MM KNEE TRITANIUM 4 KULDIPRADHA  [place] [employer] HOWM-WD [id number] Right 1 Implanted       TOURNIQUET TIME: 74 minutes    ESTIMATED BLOOD LOSS: 50  mL    COMPLICATIONS: none    POST OPERATIVE PLAN: The patient will be transferred to the orthopaedic floor from PACU once stable. Post operative antibiotics will be continued for 24 hours. Physical therapy will begin immediately, with weight bearing as tolerated. DVT prophylaxis will be with SCD's starting today, and ASA 81 mg BID starting tomorrow. I anticipate discharge to home/rehab within the first 3 post operative days.        Jelly Robertson MD  Orthopaedic Surgery   7/12/21  8:58 AM

## 2021-07-12 NOTE — H&P
Department of Orthopedic Surgery  Attending Pre-operative History and Physical        DIAGNOSIS:  Right knee osteoarthritis    INDICATION:  Failed conservative management for right knee arthritis    PROCEDURE:  Right knee JOHNNIE assisted total knee arthroplasty    CHIEF COMPLAINT:  Right Knee Pain    History Obtained From:  patient    HISTORY OF PRESENT ILLNESS:      The patient is a 44 YO femal with significant past medical history of COPD, hyperlipidemia, anxiety, asthma and back pain  who presents with right knee pain. Patient has been recalcitrant to multiple conservative treatments including PT, Injections, medications and activity modifications. She continues to have pain that affects her activities of daily living. For this reason she is here today for pre operative discussion for her right knee total arthroplasty. Today we discussed the risks of surgery. Risks include but are not limited to infection, neurovascular injury, recurring postoperative pain, arthrofibrosis, failure of hardware, pulmonary embolism, general anesthesia and all other unforeseen outcomes. Patient verbalized understanding of the risks and wished to proceed.     Past Medical History:     Anxiety      Arthritis      Asthma      Chronic back pain      Chronic lower back pain      Chronic neck pain      COPD      Difficult intravenous access       and venipuncture    Fibrocystic breast changes      GERD (gastroesophageal reflux disease)       Hiatal hernia    Herpes zoster ophthalmicus      History of tachycardia       follows with PCP    Irritable bowel syndrome      Obesity      On supplemental oxygen therapy       1l/min/nc nightly and daily prn    Psoriasis      Psoriatic arthropathy (HCC)      Right knee pain 07/2021     for TJAK 07/2021    Tinea versicolor      Wears contact lenses      Wears dentures       upper    Wears glasses        Past Surgical History:     CHOLECYSTECTOMY   11/2003    HERNIA REPAIR   28/0953     umbilical    KNEE ARTHROSCOPY Right      LAPAROSCOPY        LEEP        LYMPH NODE BIOPSY       Medications Prior to Admission:   No current facility-administered medications on file prior to encounter. Current Outpatient Medications on File Prior to Encounter   Medication Sig Dispense Refill    guaiFENesin (MUCINEX MAXIMUM STRENGTH) 1200 MG TB12 Take 1 tablet by mouth 2 times daily       albuterol (PROVENTIL) (2.5 MG/3ML) 0.083% nebulizer solution Take 2.5 mg by nebulization every 4-6 hours as needed Pt states uses every morning and then prn      OXYGEN 1/l/min/nc at night and daily prn      predniSONE (DELTASONE) 10 MG tablet Take 10 mg by mouth daily      Fluticasone-Umeclidin-Vilant (TRELEGY ELLIPTA IN) Inhale 1 puff into the lungs daily      omeprazole (PRILOSEC) 20 MG delayed release capsule Take 40 mg by mouth daily      albuterol (PROVENTIL HFA;VENTOLIN HFA) 108 (90 BASE) MCG/ACT inhaler Inhale 2 puffs into the lungs every 6 hours as needed. Allergies: Allergies   Allergen Reactions    Codeine Hives    Sulfa Antibiotics Anaphylaxis and Hives    Demerol Hcl [Meperidine] Nausea And Vomiting       History of allergic reaction to anesthesia:  No    Social History:   TOBACCO:   reports that she has been smoking cigarettes. She has a 20.00 pack-year smoking history. She has never used smokeless tobacco.  ETOH:   reports current alcohol use. DRUGS:   reports no history of drug use.     Family History:    Cancer Mother 40         Non-Hodgkin Lymphoma    Diabetes Sister      High Blood Pressure Sister      Arthritis Sister           Gout    Arthritis Father      Psoriasis Father      Diabetes Father      High Blood Pressure Father      Kidney Disease Father      Heart Disease Father      REVIEW OF SYSTEMS:  CONSTITUTIONAL:  negative  RESPIRATORY:  positive for COPD  CARDIOVASCULAR:  negative  MUSCULOSKELETAL:  negative except for  HPI  NEUROLOGICAL: negative    PHYSICAL EXAM:  Vitals:    07/12/21 0615   BP: (!) 173/99   Pulse: 85   Resp: 20   Temp: 97.2 °F (36.2 °C)   SpO2: 99%       Gen: AOx3, NAD    Heart:  Normal peripheral pulses    Lungs:  No increased work of breathing, good air exchange     Abdomen:  no scars, non-distended and non-tender    Extremities:  No clubbing, cyanosis, or edema    Musculoskeletal:    Skin intact, right knee exam range of motion 0-120, moderate swelling visualized on inspection, small palpable effusion present. Joint line tenderness present. Patella tracked midline. Valgus varus exams are intact. Stable knee on exam.    DATA:  CBC:   Lab Results   Component Value Date     WBC 12.7 07/07/2021     RBC 4.80 07/07/2021     HGB 16.0 07/07/2021     HCT 47.9 07/07/2021     MCV 99.8 07/07/2021     MCH 33.3 07/07/2021     MCHC 33.4 07/07/2021     RDW 13.5 07/07/2021      07/07/2021     MPV 9.6 07/07/2021      BMP:          Lab Results   Component Value Date      07/07/2021     K 4.0 07/07/2021     K 4.9 12/29/2019      07/07/2021     CO2 23 07/07/2021     BUN 10 07/07/2021     LABALBU 3.9 12/29/2019     LABALBU 4.6 02/08/2011     CREATININE 0.6 07/07/2021     CALCIUM 9.7 07/07/2021     GFRAA >60 07/07/2021     LABGLOM >60 07/07/2021     GLUCOSE 135 07/07/2021     GLUCOSE 113 02/08/2011     Radiology Review: 2 views of the right knee show medial joint space narrowing with mild sclerosis of the subchondral regions. There is squaring of the femoral and tibial condyles. ASSESSMENT AND PLAN:    1. Patient is a 58-year-old female with above specified procedure planned right knee Jake robot assisted total knee arthroplasty, plan spinal anesthesia. 2.  Procedure options, risks and benefits reviewed with patient. Patient expresses understanding and has signed consent form to proceed.     3.  Patient and family were provided with pre-op and post-op instructions, prescription medications, and any other supplied needed post op (slings, braces, etc.)    Electronically signed by Jose Sood DO on 7/7/21 at 3:39 PM EDT      Staff Addendum    I have seen and evaluated the patient and agree with the assessment and plan as documented by the orthopaedic surgery resident. I have performed the key components of the history and physical examination and concur with the findings and plan, and have made changes where appropriate/necessary. Henny Ruiz MD  [address]      Controlled substances monitoring: possible medication side effects, risk of tolerance and/or dependence, and alternative treatments discussed, no signs of potential drug abuse or diversion identified and OARRS report reviewed today- activity consistent with treatment plan. Update History & Physical     The patient's History and Physical was reviewed with the patient and there were no significant changes. I examined the patient and there were no significant changes from the previous History and Physical.     Plan: The risk, benefits, expected outcome, and alternative to the recommended procedure have been discussed with the patient. Patient understands and wants to proceed with the procedure.        Tj Jacome CNP  Orthopedic Surgery   07/12/21  6:45 AM

## 2021-07-12 NOTE — PROGRESS NOTES
Physical Therapy    Facility/Department: [facility] SURGERY  Initial Assessment    NAME: Jasmin Mancilla  : 1970  MRN: [id number]    Date of Service: 2021       REQUIRES PT FOLLOW UP: Yes       Patient Diagnosis(es): The encounter diagnosis was Preop testing. has a past medical history of Anxiety, Arthritis, Asthma, Chronic back pain, Chronic lower back pain, Chronic neck pain, COPD, Difficult intravenous access, Fibrocystic breast changes, GERD (gastroesophageal reflux disease), Herpes zoster ophthalmicus, History of tachycardia, Irritable bowel syndrome, Obesity, On supplemental oxygen therapy, Psoriasis, Psoriatic arthropathy ([facility] [address] Right knee pain, Tinea versicolor, Wears contact lenses, Wears dentures, and Wears glasses. has a past surgical history that includes laparoscopy; Cholecystectomy (2003); LEEP; lymph node biopsy; Knee arthroscopy (Right); hernia repair (2003); and Total knee arthroplasty (Right, 2021). Evaluating Therapist: Kimber Leon, PT     rec ww     Referring Provider:   Dr. Julia Villela     PT order : PT eval and treat     Room #:  710   DIAGNOSIS:  OA s/p R TKA   PRECAUTIONS: falls, FWBAT     Social:  Pt lives with  Family  in a 2  floor plan with first floor set up and  1+1 +2  steps and   1  rails to enter. Prior to admission pt walked with  No AD. Has home O2, uses 1 LNC PRN      Initial Evaluation  Date: 2021  Treatment      Short Term/ Long Term   Goals   Was pt agreeable to Eval/treatment? yes      Does pt have pain?   R knee      Bed Mobility  Rolling:  NT   Supine to sit:  SBA   Sit to supine:  NT   Scooting:  SBA    S/I    Transfers Sit to stand:  CGA   Stand to sit:  CGA   Stand pivot:  NT    SBA    Ambulation     15 and 60  feet with  ww  with  CGA    150-200  feet with ww  with  SBA        Stair negotiation: ascended and descended NT    4  steps with  1-2  rail with SBA    LE ROM  AAROM R knee 5-80 degrees      LE strength  4-/ 5 R knee in available range      AM- PAC RAW score   17/ 24            Pt is alert and Oriented x  3      Balance:  CGA     Endurance: decreased   Bed/Chair alarm:  no     ASSESSMENT  Pt displays functional ability as noted in the objective portion of this evaluation. Conditions Requiring Skilled Therapeutic Intervention:    [x]Decreased strength     [x]Decreased ROM  [x]Decreased functional mobility  [x]Decreased balance   [x]Decreased endurance   [x]Decreased posture  []Decreased sensation  [x]Decreased coordination   []Decreased vision  []Decreased safety awareness   [x]Increased pain             Treatment/Education:    Mobility as above. Pt on O2@ 2 LNC. Pulse ox 97%. On RA, pulse ox at rest 93%, decreased to 87% with sitting EOB. O2 re-applied prior to further mobility. Cues for hand and foot placement with transfers and ww safety with gait. Multiple cues to avoid twisitng on R knee     Pt educated on fall risk, safe and proper technique with mobility, LE exercises        Patient response to education:   Pt verbalized understanding Pt demonstrated skill Pt requires further education in this area   x  with cues   x       Comments:  Pt left  In chair after session, with call light in reach. Rehab potential is Good for reaching above PT goals. Pts/ family goals   1. Home     Patient and or family understand(s) diagnosis, prognosis, and plan of care. -  Yes     PLAN  PT care will be provided in accordance with the objectives noted above. Whenever appropriate, clear delegation orders will be provided for nursing staff. Exercises and functional mobility practice will be used as well as appropriate assistive devices or modalities to obtain goals. Patient and family education will also be administered as needed.         PLAN OF CARE:    Current Treatment Recommendations     [x] Strengthening to improve independence with functional mobility   [x] ROM to improve independence with functional mobility   [x] Balance Training to improve static/dynamic balance and to reduce fall risk  [x] Endurance Training to improve activity tolerance during functional mobility   [x] Transfer Training to improve safety and independence with all functional transfers   [x] Gait Training to improve gait mechanics, endurance and assess need for appropriate assistive device  [x] Stair Training in preparation for safe discharge home and/or into the community   [x] Positioning to prevent skin breakdown and contractures  [x] Safety and Education Training   [x] Patient/Caregiver Education   [x] HEP  [] Other     Frequency of treatments will be BID x 2 days. Time in: 1526  Time out: 1543       Evaluation Time includes thorough review of current medical information, gathering information on past medical history/social history and prior level of function, completion of standardized testing/informal observation of tasks, assessment of data and education on plan of care and goals.     CPT codes:  [x] Low Complexity PT evaluation 31028  [] Moderate Complexity PT evaluation 66798  [] High Complexity PT evaluation 25603  [] PT Re-evaluation 78360  [] Gait training 45004  minutes  [] Therapeutic activities 78151  minutes  [] Therapeutic exercises 00657  minutes  [] Neuromuscular reeducation 56322  minutes       Vashti 18 number:  PT 5251

## 2021-07-12 NOTE — ANESTHESIA PROCEDURE NOTES
Peripheral Block    Patient location during procedure: procedure area  Staffing  Performed: anesthesiologist   Anesthesiologist: Arlinda Mcburney, MD  Preanesthetic Checklist  Completed: patient identified, IV checked, site marked, risks and benefits discussed, surgical consent, monitors and equipment checked, pre-op evaluation, timeout performed, anesthesia consent given, oxygen available and patient being monitored  Peripheral Block  Patient position: supine  Prep: ChloraPrep  Patient monitoring: cardiac monitor, continuous pulse ox, frequent blood pressure checks and IV access  Block type: iPacks  Laterality: right  Injection technique: single-shot  Guidance: ultrasound guided  Local infiltration: ropivacaine  Infiltration strength: 1 %  Provider prep: mask and sterile gloves  Local infiltration: ropivacaine  Needle  Needle type: combined needle/nerve stimulator   Needle gauge: 20 G  Needle length: 10 cm  Needle localization: ultrasound guidance  Assessment  Injection assessment: negative aspiration for heme, no paresthesia on injection and local visualized surrounding nerve on ultrasound  Slow fractionated injection: yes  Hemodynamics: stable  Additional Notes  U/S 23889.  (1) Under ultrasound guidance, a 2 gauge needle was inserted and placed in close proximity to the right sciatic nerve.  (2) Ultrasound was also used to visualize the spread of the anesthetic in close proximity to the nerve being blocked. (3) The nerve appeared anatomically normal, and (4 there were no apparent abnormal pathological findings on the image that were readily visible and related to the nerve being blocked. (5) A permanent ultrasound image was saved in the patient's record.       Reason for block: post-op pain management and at surgeon's request

## 2021-07-12 NOTE — PROGRESS NOTES
Report called to 7th floor RN. Vs stable. Denies pain. Moving legs bilaterally with full sensation.  Family waiting called to have  go to patient room

## 2021-07-12 NOTE — PROGRESS NOTES
5377 time out performed for right adductor canal block. Sedation given as per ordered see MAR. Procedure began  3127 procedure ended. Vs stable. Denies pain. 0645  at bedside.

## 2021-07-12 NOTE — PROGRESS NOTES
Occupational Therapy  OCCUPATIONAL THERAPY INITIAL EVALUATION     [address] [facility] [place], [place]       NNBT:  Patient Name: Teo Mcgregor  MRN: [id number]  : 1970  Room: [address] [place], [place] [address]    Evaluating OT:  Chris OTR/L [id number]    Referring Provider: YANI Medel CNP  Specific Provider Orders/Date: eval and treat 21    Diagnosis: R knee OA   Procedure: 21 R TKA   Pertinent Medical History: arthritis, COPD, asthma, anxiety     Precautions:  fall risk, FWBAT R LE, O2    Assessment of current deficits   [x] Functional mobility  [x]ADLs  [x] Strength               [x]Cognition   [x] Functional transfers   [x] IADLs         [x] Safety Awareness   [x]Endurance   [] Fine Coordination              [x] Balance      [] Vision/perception   []Sensation    []Gross Motor Coordination  [] ROM  [] Delirium                   [] Motor Control     OT PLAN OF CARE   OT POC based on physician orders, patient diagnosis and results of clinical assessment    Frequency/Duration 2-5 days/wk for 10-14 days PRN   Specific OT Treatment Interventions to include:   * Instruction/training on adapted ADL techniques and AE recommendations to increase functional independence within precautions       * Training on energy conservation strategies, correct breathing pattern and techniques to improve independence/tolerance for self-care routine  * Functional transfer/mobility training/DME recommendations for increased independence, safety, and fall prevention  * Patient/Family education to increase follow through with safety techniques and functional independence  * Recommendation of environmental modifications for increased safety with functional transfers/mobility and ADLs  * Therapeutic exercise to improve motor endurance, ROM, and functional strength for ADLs/functional transfers  * Therapeutic activities to facilitate/challenge dynamic balance, stand tolerance for increased safety and independence with ADLs    Recommended Adaptive Equipment: TBD     Home Living: Pt lives with  and family in a 2 story home with B/B 1st floor. Bathroom setup: walk in shower with grab bar and seat, standard commode     Prior Level of Function: Independent with ADLs, Independent with IADLs; completed functional mobility with no AD. Driving: Yes    Pain Level: R LE pain with movement, increased comfort at rest    Cognition: A&O: 4/4. Problem solving:  WFL   Judgement/safety:  WFL     Functional Assessment: AM-PAC Daily Activity Raw Score:    Initial Eval Status  Date: 21 Treatment session:  STGs=LTGS  Timeframe 10-14 days     Feeding Independent     Grooming SBA  Standing sink level for hand hygiene, cues to avoid twisting on R Knee during task  Independent   UB Dressing Set up  Donning overhead dress  Mod I   LB Dressing Mod A  Management of B socks  Mod I    Bathing Min A  Mod I   Toileting SBA  Use of grab bar for support in transfer  Able to manage neris care  Mod I   Bed Mobility  Supine to sit: SBA     Functional Transfers STS: SBA  Mod I   Functional Mobility SBA with [employer]  Household distance  Mod I during ADLs   Balance Sitting: fair plus    Standing: fair at [employer]     Activity Tolerance Fair minus  O2 2L  Restin%  RA restin%  With minimal exertion on RA: 87%  Donned 2L, post bathroom activity: 92%  Instructed on use of incentive spirometer  standing makenzie x7-8 min with fair plus balance during self care tasks           ADL: Requires UE support at countertop to maintain standing balance during grooming tasks. Functional endurance training, standing tolerance and incorporation of compensatory strategies during bathroom ADL in preparation for maximum safety and independence during self care tasks in home environment. Min cues throughout session for follow through of proper technique.     Treatment: Patient educated on techniques for completion of ADL, safe functional transfers and functional mobility. Patient required cues for follow through with proper hand/foot placement, pacing, safety, compensatory strategies, breathing, precautions and technique in bed mobility, functional transfers, functional mobility, toileting, grooming, UB dressing and LB dressing in preparation for maximum independence in all self care tasks. Hand Dominance: Right   Strength ROM Additional Info:    RUE  4/5 WFL good  and FMC/dexterity noted during ADL tasks     LUE 4/5 WFL good  and FMC/dexterity noted during ADL tasks         Hearing: WFL   Vision: WFL   Sensation:  No c/o numbness or tingling   Tone: WFL   Edema: none                             Rehab Potential: Good for established goals     Patient/Family Goal: To get home. Patient and/or family were instructed on functional diagnosis, prognosis/goals and OT plan of care. Pt verbalized understanding. Upon arrival, patient supine in bed. At end of session, patient seated in armchair with call light and phone within reach, all lines and tubes intact. Pt would benefit from continued skilled OT to increase safety and independence with completion of ADL/IADL tasks for functional independence and quality of life.     Low Evaluation 76914  Time In: 1525   Time Out: 1543  Total: 18 min      Evaluation time includes thorough review of current medical information, gathering information on past medical history/social history and prior level of function, completion of standardized testing/informal observation of tasks, assessment of data, and development of POC/Goals    Simone Singh OTR/L  [id number]

## 2021-07-13 ENCOUNTER — APPOINTMENT (OUTPATIENT)
Dept: GENERAL RADIOLOGY | Age: 51
End: 2021-07-13
Attending: ORTHOPAEDIC SURGERY
Payer: COMMERCIAL

## 2021-07-13 VITALS
SYSTOLIC BLOOD PRESSURE: 146 MMHG | BODY MASS INDEX: 28.66 KG/M2 | RESPIRATION RATE: 16 BRPM | DIASTOLIC BLOOD PRESSURE: 70 MMHG | OXYGEN SATURATION: 95 % | TEMPERATURE: 98.1 F | HEART RATE: 83 BPM | WEIGHT: 146 LBS | HEIGHT: 60 IN

## 2021-07-13 LAB
HCT VFR BLD CALC: 43.3 % (ref 34–48)
HEMOGLOBIN: 14 G/DL (ref 11.5–15.5)
MCH RBC QN AUTO: 33.1 PG (ref 26–35)
MCHC RBC AUTO-ENTMCNC: 32.3 % (ref 32–34.5)
MCV RBC AUTO: 102.4 FL (ref 80–99.9)
PDW BLD-RTO: 13.4 FL (ref 11.5–15)
PLATELET # BLD: 262 E9/L (ref 130–450)
PMV BLD AUTO: 9.7 FL (ref 7–12)
RBC # BLD: 4.23 E12/L (ref 3.5–5.5)
WBC # BLD: 13.5 E9/L (ref 4.5–11.5)

## 2021-07-13 PROCEDURE — 6370000000 HC RX 637 (ALT 250 FOR IP): Performed by: INTERNAL MEDICINE

## 2021-07-13 PROCEDURE — 96374 THER/PROPH/DIAG INJ IV PUSH: CPT

## 2021-07-13 PROCEDURE — 6360000002 HC RX W HCPCS: Performed by: INTERNAL MEDICINE

## 2021-07-13 PROCEDURE — 71045 X-RAY EXAM CHEST 1 VIEW: CPT

## 2021-07-13 PROCEDURE — G0378 HOSPITAL OBSERVATION PER HR: HCPCS

## 2021-07-13 PROCEDURE — 6370000000 HC RX 637 (ALT 250 FOR IP): Performed by: NURSE PRACTITIONER

## 2021-07-13 PROCEDURE — 96375 TX/PRO/DX INJ NEW DRUG ADDON: CPT

## 2021-07-13 PROCEDURE — 2580000003 HC RX 258: Performed by: NURSE PRACTITIONER

## 2021-07-13 PROCEDURE — 2700000000 HC OXYGEN THERAPY PER DAY

## 2021-07-13 PROCEDURE — 94640 AIRWAY INHALATION TREATMENT: CPT

## 2021-07-13 PROCEDURE — 97530 THERAPEUTIC ACTIVITIES: CPT

## 2021-07-13 PROCEDURE — 97535 SELF CARE MNGMENT TRAINING: CPT

## 2021-07-13 PROCEDURE — 36415 COLL VENOUS BLD VENIPUNCTURE: CPT

## 2021-07-13 PROCEDURE — 85027 COMPLETE CBC AUTOMATED: CPT

## 2021-07-13 PROCEDURE — 6360000002 HC RX W HCPCS: Performed by: NURSE PRACTITIONER

## 2021-07-13 PROCEDURE — 97110 THERAPEUTIC EXERCISES: CPT

## 2021-07-13 PROCEDURE — 96376 TX/PRO/DX INJ SAME DRUG ADON: CPT

## 2021-07-13 RX ORDER — DOXYCYCLINE HYCLATE 100 MG/1
100 CAPSULE ORAL EVERY 12 HOURS SCHEDULED
Status: DISCONTINUED | OUTPATIENT
Start: 2021-07-13 | End: 2021-07-13 | Stop reason: HOSPADM

## 2021-07-13 RX ORDER — METHYLPREDNISOLONE SODIUM SUCCINATE 40 MG/ML
40 INJECTION, POWDER, LYOPHILIZED, FOR SOLUTION INTRAMUSCULAR; INTRAVENOUS EVERY 12 HOURS
Status: DISCONTINUED | OUTPATIENT
Start: 2021-07-13 | End: 2021-07-13 | Stop reason: HOSPADM

## 2021-07-13 RX ORDER — ASPIRIN 81 MG/1
81 TABLET ORAL 2 TIMES DAILY
Qty: 56 TABLET | Refills: 0 | Status: SHIPPED | OUTPATIENT
Start: 2021-07-13 | End: 2021-09-22

## 2021-07-13 RX ORDER — OXYCODONE HYDROCHLORIDE AND ACETAMINOPHEN 5; 325 MG/1; MG/1
1 TABLET ORAL EVERY 6 HOURS PRN
Qty: 28 TABLET | Refills: 0 | Status: SHIPPED | OUTPATIENT
Start: 2021-07-13 | End: 2021-07-20

## 2021-07-13 RX ORDER — IPRATROPIUM BROMIDE AND ALBUTEROL SULFATE 2.5; .5 MG/3ML; MG/3ML
1 SOLUTION RESPIRATORY (INHALATION) 4 TIMES DAILY
Status: DISCONTINUED | OUTPATIENT
Start: 2021-07-13 | End: 2021-07-13 | Stop reason: HOSPADM

## 2021-07-13 RX ADMIN — OXYCODONE 10 MG: 5 TABLET ORAL at 00:32

## 2021-07-13 RX ADMIN — GUAIFENESIN 400 MG: 400 TABLET ORAL at 08:05

## 2021-07-13 RX ADMIN — ACETAMINOPHEN 650 MG: 325 TABLET ORAL at 10:45

## 2021-07-13 RX ADMIN — GUAIFENESIN 400 MG: 400 TABLET ORAL at 14:02

## 2021-07-13 RX ADMIN — OXYCODONE 10 MG: 5 TABLET ORAL at 07:24

## 2021-07-13 RX ADMIN — IPRATROPIUM BROMIDE AND ALBUTEROL SULFATE 1 AMPULE: .5; 3 SOLUTION RESPIRATORY (INHALATION) at 11:42

## 2021-07-13 RX ADMIN — PANTOPRAZOLE SODIUM 40 MG: 40 TABLET, DELAYED RELEASE ORAL at 05:49

## 2021-07-13 RX ADMIN — BUDESONIDE 500 MCG: 0.5 SUSPENSION RESPIRATORY (INHALATION) at 07:55

## 2021-07-13 RX ADMIN — ASPIRIN 81 MG: 81 TABLET, COATED ORAL at 08:05

## 2021-07-13 RX ADMIN — METHYLPREDNISOLONE SODIUM SUCCINATE 40 MG: 40 INJECTION, POWDER, LYOPHILIZED, FOR SOLUTION INTRAMUSCULAR; INTRAVENOUS at 08:05

## 2021-07-13 RX ADMIN — KETOROLAC TROMETHAMINE 15 MG: 30 INJECTION, SOLUTION INTRAMUSCULAR; INTRAVENOUS at 05:49

## 2021-07-13 RX ADMIN — ACETAMINOPHEN 650 MG: 325 TABLET ORAL at 05:49

## 2021-07-13 RX ADMIN — DOXYCYCLINE HYCLATE 100 MG: 100 CAPSULE ORAL at 08:05

## 2021-07-13 RX ADMIN — SODIUM CHLORIDE, PRESERVATIVE FREE 10 ML: 5 INJECTION INTRAVENOUS at 08:05

## 2021-07-13 RX ADMIN — ARFORMOTEROL TARTRATE 15 MCG: 15 SOLUTION RESPIRATORY (INHALATION) at 07:55

## 2021-07-13 RX ADMIN — OXYCODONE 10 MG: 5 TABLET ORAL at 12:28

## 2021-07-13 RX ADMIN — KETOROLAC TROMETHAMINE 15 MG: 30 INJECTION, SOLUTION INTRAMUSCULAR; INTRAVENOUS at 10:45

## 2021-07-13 RX ADMIN — IPRATROPIUM BROMIDE AND ALBUTEROL SULFATE 1 AMPULE: .5; 3 SOLUTION RESPIRATORY (INHALATION) at 07:55

## 2021-07-13 RX ADMIN — OXYCODONE 10 MG: 5 TABLET ORAL at 16:24

## 2021-07-13 RX ADMIN — METOPROLOL TARTRATE 12.5 MG: 25 TABLET, FILM COATED ORAL at 08:05

## 2021-07-13 RX ADMIN — IPRATROPIUM BROMIDE AND ALBUTEROL SULFATE 1 AMPULE: .5; 3 SOLUTION RESPIRATORY (INHALATION) at 15:53

## 2021-07-13 RX ADMIN — CETIRIZINE HYDROCHLORIDE 10 MG: 10 TABLET, FILM COATED ORAL at 08:06

## 2021-07-13 RX ADMIN — DOCUSATE SODIUM 50 MG AND SENNOSIDES 8.6 MG 1 TABLET: 8.6; 5 TABLET, FILM COATED ORAL at 08:05

## 2021-07-13 ASSESSMENT — PAIN DESCRIPTION - DESCRIPTORS
DESCRIPTORS: ACHING
DESCRIPTORS: ACHING;PRESSURE
DESCRIPTORS: ACHING;DISCOMFORT
DESCRIPTORS: PRESSURE

## 2021-07-13 ASSESSMENT — PAIN DESCRIPTION - PROGRESSION
CLINICAL_PROGRESSION: NOT CHANGED

## 2021-07-13 ASSESSMENT — PAIN SCALES - GENERAL
PAINLEVEL_OUTOF10: 7
PAINLEVEL_OUTOF10: 6
PAINLEVEL_OUTOF10: 7
PAINLEVEL_OUTOF10: 5
PAINLEVEL_OUTOF10: 4
PAINLEVEL_OUTOF10: 6
PAINLEVEL_OUTOF10: 7
PAINLEVEL_OUTOF10: 2
PAINLEVEL_OUTOF10: 7

## 2021-07-13 ASSESSMENT — PAIN DESCRIPTION - PAIN TYPE
TYPE: SURGICAL PAIN
TYPE: ACUTE PAIN;SURGICAL PAIN
TYPE: SURGICAL PAIN
TYPE: SURGICAL PAIN

## 2021-07-13 ASSESSMENT — PAIN DESCRIPTION - ONSET
ONSET: ON-GOING
ONSET: GRADUAL
ONSET: ON-GOING
ONSET: ON-GOING

## 2021-07-13 ASSESSMENT — PAIN DESCRIPTION - LOCATION
LOCATION: KNEE

## 2021-07-13 ASSESSMENT — PAIN DESCRIPTION - ORIENTATION
ORIENTATION: RIGHT

## 2021-07-13 ASSESSMENT — PAIN - FUNCTIONAL ASSESSMENT
PAIN_FUNCTIONAL_ASSESSMENT: ACTIVITIES ARE NOT PREVENTED
PAIN_FUNCTIONAL_ASSESSMENT: PREVENTS OR INTERFERES SOME ACTIVE ACTIVITIES AND ADLS
PAIN_FUNCTIONAL_ASSESSMENT: PREVENTS OR INTERFERES SOME ACTIVE ACTIVITIES AND ADLS
PAIN_FUNCTIONAL_ASSESSMENT: ACTIVITIES ARE NOT PREVENTED

## 2021-07-13 ASSESSMENT — PAIN DESCRIPTION - FREQUENCY
FREQUENCY: CONTINUOUS

## 2021-07-13 NOTE — PROGRESS NOTES
CLINICAL PHARMACY NOTE: MEDS TO BEDS    Total # of Prescriptions Filled: 2   The following medications were delivered to the patient:  · Aspirin 81 mg  · Percocet 5-325 mg    Additional Documentation:  [id number] Alize Cast to deliver [place]

## 2021-07-13 NOTE — PLAN OF CARE
Problem: Pain:  Goal: Pain level will decrease  Description: Pain level will decrease  Outcome: Ongoing     Problem: Mobility - Impaired:  Goal: Mobility will improve  Description: Mobility will improve  Outcome: Ongoing     Problem: Infection - Surgical Site:  Goal: Will show no infection signs and symptoms  Description: Will show no infection signs and symptoms  Outcome: Ongoing     Problem: Pain - Acute:  Goal: Pain level will decrease  Description: Pain level will decrease  Outcome: Ongoing

## 2021-07-13 NOTE — PLAN OF CARE
Problem: Falls - Risk of:  Goal: Will remain free from falls  Description: Will remain free from falls  Outcome: Met This Shift  Goal: Absence of physical injury  Description: Absence of physical injury  Outcome: Met This Shift     Problem: Pain:  Goal: Pain level will decrease  Description: Pain level will decrease  Outcome: Met This Shift     Problem: Mobility - Impaired:  Goal: Mobility will improve  Description: Mobility will improve  Outcome: Met This Shift     Problem: Pain - Acute:  Goal: Pain level will decrease  Description: Pain level will decrease  Outcome: Met This Shift

## 2021-07-13 NOTE — PROGRESS NOTES
Physical Therapy  Facility/Department: [address] ORTHO SURGERY  Daily Treatment Note  NAME: Laura Hughes  : 1970  MRN: [id number]    Date of Service: 2021          Patient Diagnosis(es): The primary encounter diagnosis was Status post total knee replacement, right. Diagnoses of Preop testing and Status post total right knee replacement using cement were also pertinent to this visit. has a past medical history of Anxiety, Arthritis, Asthma, Chronic back pain, Chronic lower back pain, Chronic neck pain, COPD, Difficult intravenous access, Fibrocystic breast changes, GERD (gastroesophageal reflux disease), Herpes zoster ophthalmicus, History of tachycardia, Irritable bowel syndrome, Obesity, On supplemental oxygen therapy, Psoriasis, Psoriatic arthropathy ([facility] [address] Right knee pain, Tinea versicolor, Wears contact lenses, Wears dentures, and Wears glasses. has a past surgical history that includes laparoscopy; Cholecystectomy (2003); LEEP; lymph node biopsy; Knee arthroscopy (Right); hernia repair (2003); and Total knee arthroplasty (Right, 2021). Evaluating Therapist: Chester Hernandes, PT      rec ww      Referring Provider:   Dr. Carmita Patricia     PT order : PT eval and treat      Room #:  710   DIAGNOSIS:  OA s/p R TKA   PRECAUTIONS: falls, FWBAT      Social:  Pt lives with  Family  in a 2  floor plan with first floor set up and  1+1 +2  steps and   1  rails to enter. Prior to admission pt walked with  No AD.  Has home O2, uses 1 LNC PRN        Initial Evaluation  Date: 2021  Treatment     7/13 PM  Short Term/ Long Term   Goals   Was pt agreeable to Eval/treatment?  yes   yes      Does pt have pain?  R knee  R knee       Bed Mobility  Rolling:  NT   Supine to sit:  SBA   Sit to supine:  NT   Scooting:  SBA  NT   S/I    Transfers Sit to stand:  CGA   Stand to sit:  CGA   Stand pivot:  NT   Sit <> stand : SBA   SBA    Ambulation     15 and 60  feet with  ww  with  CGA   100 feet x 1 and 15 feet x2 with ww SBA   150-200  feet with ww  with  SBA          Stair negotiation: ascended and descended NT    steps with 1 HR and cane SBA  4  steps with  1-2  rail with SBA    LE ROM  AAROM R knee 5-80 degrees        LE strength  4-/ 5 R knee in available range        AM- PAC RAW score   17/ 24 18/ 24               Pt is alert and Oriented x  3       Balance:  SBA      Endurance: decreased   Bed/Chair alarm:  no     ASSESSMENT     Pt displays functional ability as noted in the objective portion of this treatment          Conditions Requiring Skilled Therapeutic Intervention:     [x]? ?Decreased strength                                      [x]? ?Decreased ROM  [x]? ?Decreased functional mobility  [x]? ?Decreased balance   [x]? ?Decreased endurance   [x]? ?Decreased posture  []? ?Decreased sensation  [x]? ?Decreased coordination   []? ?Decreased vision  []? ?Decreased safety awareness   [x]? ?Increased pain                     Treatment/Education:    Mobility as above. O2 donned @ 1 LNC for mobility. Pulse ox > 92% throughout session with O2 @1 LNC  Cues for hand and foot placement with transfers and ww safety with gait. Cues to maintain proper distance from ww. Cues for sequencing with steps. Instructed on car transfer technique.    Pt reports she is comfortable going home     Pt educated on fall risk, safe and proper technique with mobility        Patient response to education:   Pt verbalized understanding Pt demonstrated skill Pt requires further education in this area   x  with cues   x         Comments:  Pt left  In chair after session, with call light in reach.                          PLAN     PT care will be provided in accordance with the objectives noted above. [place] Ro appropriate, clear delegation orders will be provided for nursing staff.  Exercises and functional mobility practice will be used as well as appropriate assistive devices or modalities to obtain goals.  Patient and family education will also be administered as needed.           PLAN OF CARE:     Current Treatment Recommendations      [x]? ? Strengthening to improve independence with functional mobility   [x]? ? ROM to improve independence with functional mobility   [x]? ? Balance Training to improve static/dynamic balance and to reduce fall risk  [x]? ? Endurance Training to improve activity tolerance during functional mobility   [x]? ? Transfer Training to improve safety and independence with all functional transfers   [x]? ? Gait Training to improve gait mechanics, endurance and assess need for appropriate assistive device  [x]? ? Stair Training in preparation for safe discharge home and/or into the community   [x]? ? Positioning to prevent skin breakdown and contractures  [x]? ? Safety and Education Training   [x]? ? Patient/Caregiver Education   [x]? ?[place]  []? ? Other      Frequency of treatments will be BID x 2 days.     Time in: 1326  Time out: 1340          discharge planned this PM      CPT codes:  []?? Low Complexity PT evaluation 62151  []? ? Moderate Complexity PT evaluation [id number]  []? Aldo Pereyra Complexity PT evaluation [id number]  []?? PT Re-evaluation [id number]  []? ? Gait training 80032  minutes  [x]? ? Therapeutic activities  14 minutes  []? ? Therapeutic exercises 23328   minutes  []? ? Neuromuscular reeducation [id number]  minutes         Vashti 18 number:  PT 5402

## 2021-07-13 NOTE — DISCHARGE SUMMARY
Physician Discharge Summary     Patient ID:  Zi Kincaid  [id number]  53 y.o.  1970    Admit date: 7/12/2021    Discharge date and time: 7/13/2021  5:13 PM     Admitting Physician: Marilee Gibbons MD     Discharge Physician: Krystyna Horton MD    Admission Diagnoses: Status post total right knee replacement using cement [Z96.651]    Discharge Diagnoses: Status post total right knee replacement using cement [Z96.651]    Admission Condition: good    Discharged Condition: good    Surgery: Right danae robotic assisted total knee arthroplasty     Hospital Course: The patient was admitted for elective joint replacement. The patient underwent routine right danae robotic assisted total knee arthroplasty with anesthesia and was transferred to the orthopaedic floor from PACU following surgery. The post operative hospital course was unremarkable. The patient worked with PT/OT daily to improve mobility. Pain was controlled and DVT prophylaxis was with SCD's and ASA 81 BID. The patient was discharged on POD 1 to home. Consults: PCP, PT/OT, Case management     Significant Diagnostic Studies:   Post operative xray showed components in good position     Treatments:   IV hydration  antibiotics: Ancef perioperatively for 24 hours  analgesia: oral and IV narcotics; toradol  anticoagulation: ASA 81 BID  therapies: PT, OT and SW   surgery: as above     Discharge Exam:  Operative limb incision was clean, dry, and intact. Leg swelling was minimal.  Motor and sensory were intact throughout the operative leg.       Disposition: home    Patient Instructions:   Current Discharge Medication List        CONTINUE these medications which have NOT CHANGED    Details   loratadine (CLARITIN) 10 MG tablet Take 10 mg by mouth daily      metoprolol tartrate (LOPRESSOR) 25 MG tablet Take 12.5 mg by mouth 2 times daily      guaiFENesin (MUCINEX MAXIMUM STRENGTH) 1200 MG TB12 Take 1 tablet by mouth 2 times daily       albuterol (PROVENTIL) (2.5 MG/3ML) 0.083% nebulizer solution Take 2.5 mg by nebulization every 4-6 hours as needed Pt states uses every morning and then prn      OXYGEN 1/l/min/nc at night and daily prn      predniSONE (DELTASONE) 10 MG tablet Take 10 mg by mouth daily      Fluticasone-Umeclidin-Vilant (TRELEGY ELLIPTA IN) Inhale 1 puff into the lungs daily      omeprazole (PRILOSEC) 20 MG delayed release capsule Take 40 mg by mouth daily      albuterol (PROVENTIL HFA;VENTOLIN HFA) 108 (90 BASE) MCG/ACT inhaler Inhale 2 puffs into the lungs every 6 hours as needed. Activity: no driving while on analgesics; weight bearing as tolerated. Diet: regular diet  Wound Care: as directed    Follow-up with  in 1 week.   Call [phone] for appointment     Signed:  Gauri Denise   Orthopaedic Surgery   7/13/21  4:08 PM

## 2021-07-13 NOTE — PROGRESS NOTES
Occupational Therapy  OT BEDSIDE TREATMENT NOTE      Date:2021  Patient Name: Nicole Garza  MRN: [id number]  : 1970  Room: [address] [place], [place] [address]     Evaluating OT: Angel Dick OTR/L [id number]     Referring Provider: YANI Wick CNP  Specific Provider Orders/Date: eval and treat 21     Diagnosis: R knee OA   Procedure: 21 R TKA   Pertinent Medical History: arthritis, COPD, asthma, anxiety      Precautions:  fall risk, FWBAT R LE, O2     Assessment of current deficits   [x]? Functional mobility             [x]?ADLs           [x]? Strength                  [x]? Cognition   [x]? Functional transfers           [x]? IADLs         [x]? Safety Awareness   [x]? Endurance   []? Fine Coordination              [x]? Balance      []? Vision/perception   []? Sensation     []? Gross Motor Coordination  []? ROM           []?  Delirium                   []? Motor Control      OT PLAN OF CARE   OT POC based on physician orders, patient diagnosis and results of clinical assessment     Frequency/Duration 2-5 days/wk for 10-14 days PRN   Specific OT Treatment Interventions to include:   * Instruction/training on adapted ADL techniques and AE recommendations to increase functional independence within precautions       * Training on energy conservation strategies, correct breathing pattern and techniques to improve independence/tolerance for self-care routine  * Functional transfer/mobility training/DME recommendations for increased independence, safety, and fall prevention  * Patient/Family education to increase follow through with safety techniques and functional independence  * Recommendation of environmental modifications for increased safety with functional transfers/mobility and ADLs  * Therapeutic exercise to improve motor endurance, ROM, and functional strength for ADLs/functional transfers  * Therapeutic activities to facilitate/challenge dynamic balance, stand tolerance for increased safety and independence with ADLs     Recommended Adaptive Equipment: TBD     Home Living: Pt lives with  and family in a 2 story home with B/B 1st floor. Bathroom setup: walk in shower with grab bar and seat, standard commode      Prior Level of Function: Independent with ADLs, Independent with IADLs; completed functional mobility with no AD. Driving: Yes     Pain Level: R LE pain with movement- Mild     Cognition: A&O:                 Functional Assessment: AM-PAC Daily Activity Raw Score: 18    Initial Eval Status  Date: 21 Treatment session: 21 STGs=LTGS  Timeframe 10-14 days      Feeding Independent       Grooming SBA  Standing sink level for hand hygiene, cues to avoid twisting on R Knee during task   Independent   UB Dressing Set up  Donning overhead dress   Mod I   LB Dressing Mod A  Management of B socks  Min A  Assistance required to manage R sock. Pt donned underwear with SBA. See comments Mod I    Bathing Min A   Mod I   Toileting SBA  Use of grab bar for support in transfer  Able to manage neris care   Mod I   Bed Mobility  Supine to sit: SBA       Functional Transfers STS: SBA  STS: Supervision from chair Mod I   Functional Mobility SBA with [employer]  Household distance  SBA using ww in room Mod I during ADLs   Balance Sitting: fair plus     Standing: fair at [employer]  Sitting: Independent  Standing: SBA     Activity Tolerance Fair minus  O2 2L  Restin%  RA restin%  With minimal exertion on RA: 87%  Donned 2L, post bathroom activity: 92%  Instructed on use of incentive spirometer  Fair standing makenzie x7-8 min with fair plus balance during self care tasks                    Treatment: Upon arrival pt was sitting in chair. Educated pt on AD management during mobility to ensure safety. Instructed pt on compensatory LE dressing techniques to maximize independence with ADLs.   Pt initially stated she would receive assistance with LE ADLs as needed but in PM after session pt was requesting [facility] an reacher from therapist.  AE issued. Educated pt on techniques to manage pain. Instructed pt on use of easy- slide to decrease difficulty with donning NÉSTOR hose. Issued easy slide during tx. Discussed AD management during shower transfers corresponding with home s/u to ensure safety. At end of session pt was transferred to chair with alarm on, all lines and tubes intact and call light within reach. Education: Transfer training, balance training, ADL safety and AE    · Pt has made good progress towards set goals.          Treatment Charges: Mins Units   Ther Ex  [id number]     Manual Therapy 14414     Thera Activities 94197 5 0   ADL/Home Mgt 04092 10 1   Neuro Re-ed 76593     Group Therapy      Orthotic manage/training  21680     Non-Billable Time       Time In: 9:57  Time Out: 10:12  Total Time: [address] MAURICE/L [id number]

## 2021-07-13 NOTE — PROGRESS NOTES
ORTHOPAEDIC SURGERY  Progress Note    CC: Status post right danae robotic assisted total knee arthroplasty     Subjective:  Doing well. No issues overnight. Vitals  VITALS:  BP (!) 170/96   Pulse 99   Temp 98.3 °F (36.8 °C) (Oral)   Resp 16   Ht 5' (1.524 m)   Wt 146 lb (66.2 kg)   LMP 07/03/2020   SpO2 97%   BMI 28.51 kg/m²   24HR INTAKE/OUTPUT:      Intake/Output Summary (Last 24 hours) at 7/13/2021 0726  Last data filed at 7/12/2021 0025  Gross per 24 hour   Intake 1600 ml   Output 150 ml   Net 1450 ml       PHYSICAL EXAM:    Orientation:  alert and oriented to person, place and time    Right Lower Extremity    Incision:  dressing in place, clean, dry and intact    Lower Extremity Motor :  Dorsiflexion:  5/5  Plantarflexion:  5/5  EHL:  5/5    Lower Extremity Sensory: intact L4-S1 distribution     Pulses: Foot warm/well perfused    Abnormal Exam findings:  none      LABS:    CBC:   Lab Results   Component Value Date    WBC 12.7 07/07/2021    RBC 4.80 07/07/2021    HGB 16.0 07/07/2021    HCT 47.9 07/07/2021    MCV 99.8 07/07/2021    MCH 33.3 07/07/2021    MCHC 33.4 07/07/2021    RDW 13.5 07/07/2021     07/07/2021    MPV 9.6 07/07/2021       ASSESSMENT AND PLAN:    Post operative day 1 status post right total Knee arthroplasty    - Weight bearing as tolerated  - Deep venous thrombosis prophylaxis - ASA 81 BID, SCD's. Kevin swansone bilateral, thigh high  - Continue physical therapy  - Pain Control: Wean to oral meds   - Aquacel protocol for dressing  - D/C Plan:  Home Health, follow up one week. Discussed discharge medications patient verbalized understanding.        Shonda Young, Marleny0 [facility]  Orthopaedic Surgery   7/13/21  7:26 AM

## 2021-07-13 NOTE — CONSULTS
daily    Yes Historical Provider, MD   albuterol (PROVENTIL) (2.5 MG/3ML) 0.083% nebulizer solution Take 2.5 mg by nebulization every 4-6 hours as needed Pt states uses every morning and then prn 2/2/21  Yes Historical Provider, MD   OXYGEN 1/l/min/nc at night and daily prn   Yes Historical Provider, MD   predniSONE (DELTASONE) 10 MG tablet Take 10 mg by mouth daily   Yes Historical Provider, MD   Fluticasone-Umeclidin-Vilant (TRELEGY ELLIPTA IN) Inhale 1 puff into the lungs daily   Yes Historical Provider, MD   omeprazole (PRILOSEC) 20 MG delayed release capsule Take 40 mg by mouth daily   Yes Historical Provider, MD   albuterol (PROVENTIL HFA;VENTOLIN HFA) 108 (90 BASE) MCG/ACT inhaler Inhale 2 puffs into the lungs every 6 hours as needed. Yes Historical Provider, MD        Allergies: Codeine, Sulfa antibiotics, and Demerol hcl [meperidine]    Social History     Tobacco Use    Smoking status: Current Every Day Smoker     Packs/day: 0.50     Years: 40.00     Pack years: 20.00     Types: Cigarettes    Smokeless tobacco: Never Used    Tobacco comment: down from 2 packs   Substance Use Topics    Alcohol use: Yes     Comment: socially        Review of Systems:  Respiratory: pos. For SOB   Cardiovascular: negative for chest pain and dyspnea  Gastrointestinal: negative for abdominal pain, diarrhea, nausea and vomiting  Genitourinary:negative for dysuria and hematuria  Derm: negative for rash and skin lesion(s)  Neurological: negative for seizures and tremors  Endocrine: negative for diabetic symptoms including polydipsia and polyuria    Physical Exam:  Vitals:    07/13/21 0434   BP: 125/65   Pulse: 82   Resp: 16   Temp: 98.2 °F (36.8 °C)   SpO2: 98%      Skin:  Warm and dry.   No rash or bruises  HEENT:  PERRLA, EOMI  Neck:  No JVD, No thyromegaly, No carotid bruit  Cardiac:  RRR, No gallop or murmur  Lungs:  Scattered wheezes   Abdomen: Normal bowel sounds, no HSM, non-tender  Extremities:  No clubbing, edema or cyanosis dressing right knee   Neurological:  Moves all extremities    Labs:    CBC with Differential:    Lab Results   Component Value Date    WBC 12.7 07/07/2021    RBC 4.80 07/07/2021    HGB 16.0 07/07/2021    HCT 47.9 07/07/2021     07/07/2021    MCV 99.8 07/07/2021    MCH 33.3 07/07/2021    MCHC 33.4 07/07/2021    RDW 13.5 07/07/2021    NRBC 0.0 07/07/2021    SEGSPCT 60 02/08/2011    LYMPHOPCT 9.6 07/07/2021    MONOPCT 1.7 07/07/2021    BASOPCT 0.9 07/07/2021    MONOSABS 0.25 07/07/2021    LYMPHSABS 1.27 07/07/2021    EOSABS 0.00 07/07/2021    BASOSABS 0.11 07/07/2021     CMP:    Lab Results   Component Value Date     07/07/2021    K 4.0 07/07/2021    K 4.9 12/29/2019     07/07/2021    CO2 23 07/07/2021    BUN 10 07/07/2021    CREATININE 0.6 07/07/2021    GFRAA >60 07/07/2021    LABGLOM >60 07/07/2021    GLUCOSE 135 07/07/2021    GLUCOSE 113 02/08/2011    PROT 7.3 12/29/2019    LABALBU 3.9 12/29/2019    LABALBU 4.6 02/08/2011    CALCIUM 9.7 07/07/2021    BILITOT <0.2 12/29/2019    ALKPHOS 59 12/29/2019    AST 36 12/29/2019    ALT 42 12/29/2019          Assessment and Plan:    Patient Active Problem List   Diagnosis    S/P right knee replacement     Anxiety    Asthma    Bipolar disorder ([facility] [address]    Obesity    COPD exacerbation ([facility]

## 2021-07-13 NOTE — PROGRESS NOTES
Physical Therapy  Facility/Department: [address] ORTHO SURGERY  Daily Treatment Note  NAME: Jose Roberto Silva  : 1970  MRN: [id number]    Date of Service: 2021          Patient Diagnosis(es): The primary encounter diagnosis was Status post total knee replacement, right. Diagnoses of Preop testing and Status post total right knee replacement using cement were also pertinent to this visit. has a past medical history of Anxiety, Arthritis, Asthma, Chronic back pain, Chronic lower back pain, Chronic neck pain, COPD, Difficult intravenous access, Fibrocystic breast changes, GERD (gastroesophageal reflux disease), Herpes zoster ophthalmicus, History of tachycardia, Irritable bowel syndrome, Obesity, On supplemental oxygen therapy, Psoriasis, Psoriatic arthropathy ([facility] [address] Right knee pain, Tinea versicolor, Wears contact lenses, Wears dentures, and Wears glasses. has a past surgical history that includes laparoscopy; Cholecystectomy (2003); LEEP; lymph node biopsy; Knee arthroscopy (Right); hernia repair (2003); and Total knee arthroplasty (Right, 2021). Evaluating Therapist: Quita Ramirez, PT      rec ww      Referring Provider:   Dr. Radha Henry      PT order : PT eval and treat      Room #:  710   DIAGNOSIS:  OA s/p R TKA   PRECAUTIONS: falls, FWBAT      Social:  Pt lives with  Family  in a 2  floor plan with first floor set up and  1+1 +2  steps and   1  rails to enter. Prior to admission pt walked with  No AD. Has home O2, uses 1 LNC PRN        Initial Evaluation  Date: 2021  Treatment     7/13 AM  Short Term/ Long Term   Goals   Was pt agreeable to Eval/treatment? yes   yes      Does pt have pain?   R knee  R knee       Bed Mobility  Rolling:  NT   Supine to sit:  SBA   Sit to supine:  NT   Scooting:  SBA   Supine <> sit:  S/SBA   S/I    Transfers Sit to stand:  CGA   Stand to sit:  CGA   Stand pivot:  NT   Sit <> stand : SBA   SBA    Ambulation     15 and 60  feet with  ww  with  CGA   100 feet x 2 and 15 feet x1 with ww SBA   150-200  feet with ww  with  SBA          Stair negotiation: ascended and descended NT   4 steps with B HRs SBA. 4 steps with 1 HR and cane SBA/CGA   4  steps with  1-2  rail with SBA    LE ROM  AAROM R knee 5-80 degrees        LE strength  4-/ 5 R knee in available range        AM- PAC RAW score   17/ 24   18/ 24               Pt is alert and Oriented x  3       Balance:  SBA      Endurance: decreased   Bed/Chair alarm:  no     ASSESSMENT    Pt displays functional ability as noted in the objective portion of this treatment          Conditions Requiring Skilled Therapeutic Intervention:     [x]? Decreased strength                                      [x]? Decreased ROM  [x]? Decreased functional mobility  [x]? Decreased balance   [x]? Decreased endurance   [x]? Decreased posture  []? Decreased sensation  [x]? Decreased coordination   []? Decreased vision  []? Decreased safety awareness   [x]? Increased pain                    Treatment/Education:    Mobility as above. Pulse ox on RA at rest  97%. O2 donned @ 1 LNC for mobility. Pulse ox > 92% throughout session with O2 @1 LNC  Cues for hand and foot placement with transfers and ww safety with gait. Cues to maintain proper distance from ww. Cues for sequencing with steps. Instructed on car transfer technique. Performed seated AAAROM knee flex and LAQs, AROM hip flex prior to mobility      Pt educated on fall risk, safe and proper technique with mobility, LE exercises         Patient response to education:   Pt verbalized understanding Pt demonstrated skill Pt requires further education in this area   x  with cues   x         Comments:  Pt left  In chair after session, with call light in reach.                      PLAN    PT care will be provided in accordance with the objectives noted above. Whenever appropriate, clear delegation orders will be provided for nursing staff.   Exercises and functional mobility practice will be used as well as appropriate assistive devices or modalities to obtain goals. Patient and family education will also be administered as needed.           PLAN OF CARE:     Current Treatment Recommendations      [x]? Strengthening to improve independence with functional mobility   [x]? ROM to improve independence with functional mobility   [x]? Balance Training to improve static/dynamic balance and to reduce fall risk  [x]? Endurance Training to improve activity tolerance during functional mobility   [x]? Transfer Training to improve safety and independence with all functional transfers   [x]? Gait Training to improve gait mechanics, endurance and assess need for appropriate assistive device  [x]? Stair Training in preparation for safe discharge home and/or into the community   [x]? Positioning to prevent skin breakdown and contractures  [x]? Safety and Education Training   [x]? Patient/Caregiver Education   [x]? HEP  []? Other      Frequency of treatments will be BID x 2 days.     Time in: 0924  Time out: 0948          Con't with PT POC with discharge planned this PM      CPT codes:  []? Low Complexity PT evaluation [id number]  []? Moderate Complexity PT evaluation 29044  []? High Complexity PT evaluation [id number]  []? PT Re-evaluation [id number]  []? Gait training 14831  minutes  [x]? Therapeutic activities 16763 15 minutes  [x]? Therapeutic exercises 77890  8 minutes  []?  Neuromuscular reeducation 75068  minutes         [address] number:  PT 3931

## 2021-07-13 NOTE — CARE COORDINATION
Case Management: Social Work Case Management Initial Assessment  Daisy Richardson         Met with:patient  Information verified: address, contacts, phone number, , insurance Yes  PCP: Camron Lamar DO  Pharmacy:   [facility], [address] [place] [address] [place] [address]  Phone: [phone] Fax: [phone]         Discharge Planning  Patient Status Order: Observation Date: 21  Readmission within last 30 days:  no  Current Residence: Private Residence  Living Arrangements:  Spouse/Significant Other, Children, Family Members   Home Access: [facility]-bed/bath on 1st  Entrance Stairs-Number of Steps: n/a  Support Systems:  Spouse/Significant Other, Family Members, Children, Friends/Neighbors  Current Services PTA: None Supplier: n/a  Patient able to perform ADL's: some assistance needed  DME used to aid ambulation prior to admission: high commode    Potential Assistance Needed:  N/A  Potential Assistance Purchasing Medications:  No  Does patient want to participate in local refill/meds to beds program?: Yes    Patient agreeable to home care: Yes  Type of Home Care Services:  None  Patient expects to be discharged to:  home with     Prior SNF/Rehab Placement and Facility: no  Agreeable to SNF/Rehab: No    Choices given to patient: yes    Expected Discharge date:  21  Follow Up Appointment: Best Day/ Time: Monday AM    Social Work Assessment/Plan: Patient was met in ortho [place] on , as well as today. Nichole Sheehan resides with her  and is prepared for return home with HHC/DME possibly today. She requested a wheeled walker & 3-1 commode. Choices for [facility] and DME were provided and Nichole Sheehan had no preference. A referral was given to [facility] [address] [facility] for discharge today.     Electronically signed by DOC Kraus on 21 at 10:59 AM EDT    The Plan for Transition of Care is related to the following treatment goals: HHC, DME    The Patient and/or patient representative Jeremías Jesse provided with a choice of provider and agrees with the discharge plan. [x] Yes [] No    Freedom of choice list was provided with basic dialogue that supports the patient's individualized plan of care/goals, treatment preferences and shares the quality data associated with the providers.  [x] Yes [] No    Electronically signed by DOC Cardoso on 7/13/2021 at 11:04 AM

## 2021-07-19 ENCOUNTER — OFFICE VISIT (OUTPATIENT)
Dept: ORTHOPEDIC SURGERY | Age: 51
End: 2021-07-19

## 2021-07-19 VITALS — HEIGHT: 60 IN | RESPIRATION RATE: 16 BRPM | WEIGHT: 146 LBS | BODY MASS INDEX: 28.66 KG/M2

## 2021-07-19 DIAGNOSIS — Z96.651 STATUS POST TOTAL RIGHT KNEE REPLACEMENT: Primary | ICD-10-CM

## 2021-07-19 DIAGNOSIS — M17.11 OSTEOARTHRITIS OF RIGHT KNEE, UNSPECIFIED OSTEOARTHRITIS TYPE: ICD-10-CM

## 2021-07-19 PROCEDURE — 99024 POSTOP FOLLOW-UP VISIT: CPT | Performed by: ORTHOPAEDIC SURGERY

## 2021-07-19 RX ORDER — HYDROCODONE BITARTRATE AND ACETAMINOPHEN 5; 325 MG/1; MG/1
1 TABLET ORAL EVERY 6 HOURS PRN
Qty: 28 TABLET | Refills: 0 | Status: SHIPPED | OUTPATIENT
Start: 2021-07-19 | End: 2021-07-26

## 2021-07-19 NOTE — PROGRESS NOTES
Follow Up Post Operative Visit     Surgery: Right Jake robot assisted total knee arthroplasty  Date: 7/12/2021    Subjective:    Agapito Habermann is here for follow up visit s/p above procedure. She is doing well. She has been compliant    Controlled Substances Monitoring:        Physical Exam:    Height: 5' (1.524 m), Weight: 146 lb (66.2 kg) (pt states)    General: Alert and oriented x3, no acute distress  Cardiovascular/pulmonary: No labored breathing, peripheral perfusion intact  Musculoskeletal:    Exam of the knee shows intact incision. Minimal swelling. Range of motion is about 5 to 90 degrees. The knee is grossly stable. Imaging: X-rays right knee 4 view show good alignment of right total knee replacement    Impression: Good alignment of right total knee replacement    Assessment and Plan: 1 week out from right total knee arthroplasty  She is doing well. She will continue with home PT and transition outpatient. She will ice and take medication as needed. Follow-up in 6 weeks.   No images needed at that visit    Marta Ervin MD  Orthopaedic Surgery   7/19/21  5:50 PM

## 2021-07-21 ENCOUNTER — TELEPHONE (OUTPATIENT)
Dept: ORTHOPEDIC SURGERY | Age: 51
End: 2021-07-21

## 2021-07-21 NOTE — TELEPHONE ENCOUNTER
Patient called and left  stating she was placed on Prednisone and Doxy yesterday and the physician instructed her to inform Dr Anthony Ruiz.     Patient was called and voicemail was left, instructing Dr Anthony Ruiz would like more details -- why she is taking medication , how long has to be on it , mg of medication --    Patient is s/p :   Surgery: Right Jake robot assisted total knee arthroplasty  Date: 7/12/2021    [facility]

## 2021-07-22 NOTE — TELEPHONE ENCOUNTER
Patient called and left voicemail    She states she is on antibiotic + Prednisone for COPD flare up x 5 days    [facility]

## 2021-07-28 ENCOUNTER — EVALUATION (OUTPATIENT)
Dept: PHYSICAL THERAPY | Age: 51
End: 2021-07-28
Payer: COMMERCIAL

## 2021-07-28 DIAGNOSIS — Z96.651 S/P TOTAL KNEE ARTHROPLASTY, RIGHT: Primary | ICD-10-CM

## 2021-07-28 PROCEDURE — 97110 THERAPEUTIC EXERCISES: CPT | Performed by: PHYSICAL THERAPIST

## 2021-07-28 PROCEDURE — 97161 PT EVAL LOW COMPLEX 20 MIN: CPT | Performed by: PHYSICAL THERAPIST

## 2021-07-28 NOTE — PROGRESS NOTES
[facility]          Phone: [phone] Fax: [phone]    Physical Therapy Daily Treatment Note  Date:  2021    Patient Name:  Rachel Cervantes    :  1970  MRN: <[id number]>    Restrictions/Precautions:    Diagnosis:     Diagnosis Orders   1.  S/P total knee arthroplasty, right       Treatment Diagnosis:    Insurance/Certification information:  [employer]  Referring Physician:  Corinna Head MD  Plan of care signed (Y/N):    Visit# / total visits:    Pain level: 310 to 8-9/10   Time In:  1410  Time Out:  1450    Subjective:  See evaluation     Exercises:  Exercise/Equipment Resistance/Repetitions Other comments     bike 8 min Seat @ 7     Heel slides with PFB x10 reps      Quad sets 3 sec x 10 reps      SAQ 3 sec x 10 reps      SLR 3 sec x 10 reps      Seated knee flex       Sit to stand       Hip 3 way       Step-ups Fwd  side                                                                               Other Therapeutic Activities:  PT evaluation completed    Home Exercise Program:  N/A    Manual Treatments:  N/A    Modalities:  N/A     Time-in Time-out Total Time   01223  Evaluation Low Complexity 1410 1430 20   13892  Evaluation Med Complexity      33015  Evaluation High Complexity      08019  Ther Ex 1430 1450 20   70963  Neuro Re-ed        96472  Ther Activities        23860  Manual Therapy       73228  E-stim       42216  Ultrasound            Session 1410 1450 40       Treatment/Activity Tolerance:  [x] Patient tolerated treatment well [] Patient limited by fatigue  [] Patient limited by pain  [] Patient limited by other medical complications  [] Other:     Prognosis: [x] Good [] Fair  [] Poor    Patient Requires Follow-up: [x] Yes  [] No    Plan:   [] Continue per plan of care [] Alter current plan (see comments)  [x] Plan of care initiated [] Hold pending MD visit [] Discharge  Plan for Next Session:        Electronically signed by:  Keny St PT, [id number]

## 2021-07-28 NOTE — PROGRESS NOTES
[facility]   Phone: [phone]   Fax: [phone]         Date:  2021   Patient: Rachel Cervantes  : 1970  MRN: <[id number]>  Referring Provider: No referring provider defined for this encounter. Medical Diagnosis:      Diagnosis Orders   1. S/P total knee arthroplasty, right          SUBJECTIVE:     Onset date: 21    Onset: Sudden onset    Mechanism of Injury: Pt is s/p right TKA due to OA. Previous PT: home health PT     Medical Management for Current Problem: N/A    Chief complaint: pain, edema, difficulty walking    Behavior: condition is getting better    Pain: constant  Current: 3/10     Best: 3/10     Worst:8-9/10    Symptom Type/Quality: aching  Location[de-identified] Knee: medial, calf     Aggravated by: N/A    Relieved by: N/A    Imaging results: XR KNEE RIGHT (1-2 VIEWS)    Result Date: 2021  EXAMINATION: TWO XRAY VIEWS OF THE RIGHT KNEE 2021 9:01 am COMPARISON: 2021 HISTORY: ORDERING SYSTEM PROVIDED HISTORY: post op TECHNOLOGIST PROVIDED HISTORY: Of operative side while in recovery room. Reason for exam:->post op FINDINGS: Anatomic alignment of right TKA with no abnormal bone or soft tissue findings. Anatomically aligned right TKA with no unexpected findings. XR KNEE RIGHT (3 VIEWS)    Result Date: 2021  Radiology exam is complete. No Radiologist dictation. Please follow up with ordering provider. XR KNEE BILATERAL STANDING    Result Date: 2021  Radiology exam is complete. No Radiologist dictation. Please follow up with ordering provider. XR CHEST PORTABLE    Result Date: 2021  EXAMINATION: ONE XRAY VIEW OF THE CHEST 2021 8:56 am COMPARISON: 2019 HISTORY: ORDERING SYSTEM PROVIDED HISTORY: wheezing TECHNOLOGIST PROVIDED HISTORY: Reason for exam:->wheezing FINDINGS: Cardiac silhouette size is normal without vascular congestion. There is no pulmonary consolidation or infiltrate.  No radiographically visible pneumothorax. No evidence of pleural effusion. No definite acute displaced fracture.      No acute cardiopulmonary disease in the visualized chest.       Past Medical History:  Past Medical History:   Diagnosis Date    Anxiety     Arthritis     Asthma     Chronic back pain     Chronic lower back pain     Chronic neck pain     COPD     Difficult intravenous access     and venipuncture    Fibrocystic breast changes     GERD (gastroesophageal reflux disease)     Hiatal hernia    Herpes zoster ophthalmicus     History of tachycardia     follows with PCP    Irritable bowel syndrome     Obesity     On supplemental oxygen therapy     1l/min/nc nightly and daily prn    Psoriasis     Psoriatic arthropathy ([address]     Right knee pain 07/2021    for TJAK 07/2021    Tinea versicolor     Wears contact lenses     Wears dentures     upper    Wears glasses      Past Surgical History:   Procedure Laterality Date    CHOLECYSTECTOMY  11/2003    HERNIA REPAIR  37/8279    umbilical    KNEE ARTHROSCOPY Right     LAPAROSCOPY      LEEP      LYMPH NODE BIOPSY      TOTAL KNEE ARTHROPLASTY Right 7/12/2021    RIGHT KNEE JOHNNIE ROBOTIC ASSISTED TOTAL ARTHROPLASTY  PNB performed by Rosa Cervantes MD at [facility] OR       Medications:   Current Outpatient Medications   Medication Sig Dispense Refill    aspirin EC 81 MG EC tablet Take 1 tablet by mouth 2 times daily for 28 days 56 tablet 0    loratadine (CLARITIN) 10 MG tablet Take 10 mg by mouth daily      metoprolol tartrate (LOPRESSOR) 25 MG tablet Take 12.5 mg by mouth 2 times daily      guaiFENesin (MUCINEX MAXIMUM STRENGTH) 1200 MG TB12 Take 1 tablet by mouth 2 times daily       albuterol (PROVENTIL) (2.5 MG/3ML) 0.083% nebulizer solution Take 2.5 mg by nebulization every 4-6 hours as needed Pt states uses every morning and then prn      OXYGEN 1/l/min/nc at night and daily prn      predniSONE (DELTASONE) 10 MG tablet Take 10 mg by mouth daily      Fluticasone-Umeclidin-Vilant (TRELEGY ELLIPTA IN) Inhale 1 puff into the lungs daily      omeprazole (PRILOSEC) 20 MG delayed release capsule Take 40 mg by mouth daily      albuterol (PROVENTIL HFA;VENTOLIN HFA) 108 (90 BASE) MCG/ACT inhaler Inhale 2 puffs into the lungs every 6 hours as needed. No current facility-administered medications for this visit. Occupation: does not work. Exercise regimen: none    Hobbies: gardening    Patient Goals: get back to normal    Precautions/Contraindications: COPD, on O2 PRN    OBJECTIVE:     Observations: well nourished female    Inspection: bruising right lower leg, incision well approximated    Edema: mild suprapatellar    Gait: limp R LE, ambulates with quad cane; stairs: mod independent with 1 rail and SBQC, non-reciprocal pattern    Joint/Motion:    Knee:  Right:   AROM: -5° Flexion,  90° Extension    Left:   AROM: WFL      Strength:    Knee:   Right: Hip: 4-/5, Knee: Flexion 4-/5,  Extension 4-/5  Left: Hip: 5/5, Knee: Flexion 5/5,  Extension 5/5    Palpation: N/A     Special Tests/Functional Screens:    [] Lachman's []+ / [] -    [] Anterior Drawer []+ / [] -   [] Valgus Stress []+ / [] -  [] Thessaly Test []+ / [] -   [] Shima's Sign []+ / [] -   [] Apley compression: []+ / [] - [] Bounce Home []+ / [] -   [] Abdullahi []+ / [] -   [] Pivot Shift []+ / [] -   [] Posterior Drawer []+ / [] -   [] Varus Stress []+ / [] -   [] Patellar Tracking: []+ / [] -     Special test comments: N/A      ASSESSMENT     Outcome Measure:   Lower Extremity Functional Scale (LEFS) 14/80    Problems:    Pain reported 3/10 to 8-9/10   ROM decreased    Strength decreased    Decreased functional ability with walking, stairs    Reason for Skilled Care: Pt requires rehab following right TKA    [x] There are no barriers affecting plan of care or recovery    [] Barriers to this patient's plan of care or recovery include.     Domestic Concerns:  [x] No  [] Yes:    Short Term goals (3 weeks)   Decrease reported pain to 4-5/10 at the worst   Increase ROM by 10° to 15°   Increase Strength by 1/2 MMR grade    Able to perform/complete the following functions/tasks: pt will be able to ambulate without device with a mildly antalgic gait pattern   Lower Extremity Functional Scale (LEFS) 25/80    Long Term goals (6 weeks)   Decrease reported pain to 2-3/10 at the worst   Increase ROM to 0° to 120°   Increase Strength to 4+ to 5/5    Able to perform/complete the following functions/tasks: pt will demonstrate a fluid, reciprocal gait pattern without device and reciprocal stair negotiation pattern   LEFS 50/80   Independent with Home Exercise Programs    Rehab Potential: [x] Good  [] Fair  [] Poor    PLAN       Treatment Plan:   [x] Therapeutic Exercise  [x] Therapeutic Activity  [x] Neuromuscular Re-education   [] Gait Training  [] Balance Training  [] Aerobic conditioning  [] Manual Therapy  [] Massage/Fascial release   [] Work/Sport specific activities    [] Pain Neuroscience [] Cold/hotpack  [] Vasocompression  [] Electrical Stimulation  [] Lumbar/Cervical Traction  [] Ultrasound   [] Iontophoresis: 4 mg/mL Dexamethasone Sodium Phosphate 40-80 mAmin  [] Dry Needling      [x] Instruction in HEP      []  Medication allergies reviewed for use of Dexamethasone Sodium Phosphate 4mg/ml  with iontophoresis treatments. Patient is not allergic. The following CPT codes are likely to be used in the care of this patient: 61894 PT Evaluation: Low Complexity, 73367 PT Re-Evaluation, 92111 Therapeutic Exercise, 45501 Neuromuscular Re-Education, 44981 Therapeutic Activities and 67739 Electric Stimulation      Suggested Professional Referral: [x] No  [] Yes:     Patient Education:  [x] Plans/Goals, Risks/Benefits discussed  [x] Home exercise program  Method of Education: [x] Verbal  [x] Demo  [x] Written  Comprehension of Education:  [x] Verbalizes understanding. [x] Demonstrates understanding.   [] Needs Review. [] Demonstrates/verbalizes understanding of HEP/Ed previously given. Frequency: 2 days per week for 6 weeks    Patient understands diagnosis/prognosis and consents to treatment, plan and goals: [x] Yes    [] No     Thank you for the opportunity to work with your patient. If you have questions or comments, please contact me at numbers listed above. Electronically signed by: Jared Fagan, [id number]    Medicare Patients Only     Please sign Physician's Certification and return to: [facility]  PHYSICAL THERAPY  [address] [facility] [address] [id number]  Dept: [phone]  Dept Fax: [phone] Certification / Comments     Frequency/Duration 2 days per week for 6 weeks. Certification period from 7/28/2021  to 10/27/21. I have reviewed the Plan of Care established for skilled therapy services and certify that the services are required and that they will be provided while the patient is under my care.     Physician's Comments/Revisions:               Physician's Printed Name:                                           [de-identified] Signature:                                                               Date:

## 2021-07-29 DIAGNOSIS — Z96.651 STATUS POST TOTAL RIGHT KNEE REPLACEMENT: Primary | ICD-10-CM

## 2021-07-29 RX ORDER — HYDROCODONE BITARTRATE AND ACETAMINOPHEN 5; 325 MG/1; MG/1
1 TABLET ORAL EVERY 6 HOURS PRN
Qty: 28 TABLET | Refills: 0 | Status: SHIPPED | OUTPATIENT
Start: 2021-07-29 | End: 2021-08-05

## 2021-07-29 NOTE — TELEPHONE ENCOUNTER
Pt requesting refill of narcotics   2nd post-operative request   [facility]   Pended and routed for signature     Surgery: Right Jake robot assisted total knee arthroplasty  Date: 7/12/2021 None

## 2021-08-03 ENCOUNTER — TREATMENT (OUTPATIENT)
Dept: PHYSICAL THERAPY | Age: 51
End: 2021-08-03
Payer: COMMERCIAL

## 2021-08-03 DIAGNOSIS — Z96.651 S/P TOTAL KNEE ARTHROPLASTY, RIGHT: Primary | ICD-10-CM

## 2021-08-03 PROCEDURE — 97110 THERAPEUTIC EXERCISES: CPT

## 2021-08-03 NOTE — PROGRESS NOTES
[facility]          Phone: [phone] Fax: [phone]    Physical Therapy Daily Treatment Note  Date:  8/3/2021    Patient Name:  Agapito Habermann    :  1970  MRN: <[id number]>    Restrictions/Precautions:    Diagnosis:     Diagnosis Orders   1. S/P total knee arthroplasty, right       Treatment Diagnosis:    Insurance[facility]Certification information:  [facility]  Referring Physician:  Marta Ervin MD  Plan of care signed (Y/N):    Visit# / total visits:    Pain level: 3-9/10 R knee activity dependant  Time In:  1105  Time Out:  1130    Subjective:  Pt c/o \" cramping \" in R calf, and HS. Therapist performed Homans test to pt R LE and it was (-) Homans. Pt's skin on R calf and thigh inspected, it is not red, or warm to palpation. Therapist advised pt to contact physician just as cautionary measure and report symptoms. Pt reported understanding     Exercises:  Exercise/Equipment Resistance/Repetitions Other comments     bike 8 min Seat @ 7     Heel slides with PFB x10 reps 8/3 HEP     Quad sets 3 sec x 10 reps 8/3 HEP     SAQ 3 sec x 10 reps 8/3 HEP     SLR 3 sec x 10 reps 8/3 HEP     Seated knee flex TBA      Sit to stand X 10 reps  8/3 HEP     Hip 3 way X 10 reps B  8/3 HEP     Step-ups Fwd   TBA  side        Gait without SBQC X 50 ft supervision                                                                      Other :  Pt tolerated ex performed today without exacerbation in pain. Gait is reciprocating , w/ equal step, stride, and no AD. Pt has a mild deviation to her gait laterally , however pt attributes it to concentrating on keeping her R LE neutrally aligned w/ gait. She reported \" I used to turn my R leg out when I walked before surgery. \" therapist advised pt to continue to use SBQC on uneven surfaces and on steps. Pt reported understanding. Home Exercise Program:  8/3/21: pt was provided w/ and it was reviewed w/pt HEP handout.   Pt demonstrated understanding to therapist.    Manual Treatments:  N/A    Modalities:  N/A     Time-in Time-out Total Time   19650  Evaluation Low Complexity      87946  Evaluation Med Complexity      02787  Evaluation High Complexity      96350  Ther Ex 1105 1135 30   46871  Neuro Re-ed        20186  Ther Activities        05846  Manual Therapy       35124  E-stim       57512  Ultrasound            Session 2518 8181 79       Treatment/Activity Tolerance:  [x] Patient tolerated treatment well [] Patient limited by fatigue  [] Patient limited by pain  [] Patient limited by other medical complications  [] Other:     Prognosis: [x] Good [] Fair  [] Poor    Patient Requires Follow-up: [x] Yes  [] No    Plan:   [x] Continue per plan of care [] Alter current plan (see comments)  [] Plan of care initiated [] Hold pending MD visit [] Discharge  Plan for Next Session:        Electronically signed by:  Pauline Meckel, PTA [id number]

## 2021-08-09 DIAGNOSIS — Z96.651 STATUS POST TOTAL RIGHT KNEE REPLACEMENT: Primary | ICD-10-CM

## 2021-08-09 RX ORDER — HYDROCODONE BITARTRATE AND ACETAMINOPHEN 5; 325 MG/1; MG/1
1 TABLET ORAL EVERY 6 HOURS PRN
Qty: 28 TABLET | Refills: 0 | Status: SHIPPED | OUTPATIENT
Start: 2021-08-09 | End: 2021-08-16

## 2021-08-09 NOTE — TELEPHONE ENCOUNTER
Pt requesting refill of narcotics   3rd post-operative request   [facility]   Pended and routed for signature      Surgery: Right Jake robot assisted total knee arthroplasty  Date: 7/12/2021    Patient is also complaining of bilateral leg cramping, states this was an issue pre-operatively, she was being worked-up for nerve issues prior. Had a normal EMG 6/3/21. Expressed to the pt that she should call her PCP for advice d/t him treating this. To continue trying to push fluids, verbalized understanding.

## 2021-08-10 ENCOUNTER — TREATMENT (OUTPATIENT)
Dept: PHYSICAL THERAPY | Age: 51
End: 2021-08-10
Payer: COMMERCIAL

## 2021-08-10 DIAGNOSIS — Z96.651 S/P TOTAL KNEE ARTHROPLASTY, RIGHT: Primary | ICD-10-CM

## 2021-08-10 PROCEDURE — 97110 THERAPEUTIC EXERCISES: CPT | Performed by: PHYSICAL THERAPIST

## 2021-08-10 NOTE — PROGRESS NOTES
[facility]          Phone: [phone] Fax: [phone]    Physical Therapy Daily Treatment Note  Date:  8/10/2021    Patient Name:  Arturo Fleischer    :  1970  MRN: <[id number]>    Restrictions/Precautions:    Diagnosis:     Diagnosis Orders   1. S/P total knee arthroplasty, right       Treatment Diagnosis:    Insurance/Certification information:  [employer]  Referring Physician:  Elvira Mathew MD  Plan of care signed (Y/N):    Visit# / total visits:  3/12  Pain level: 3-9/10 R knee activity dependant  Time In:  1123  Time Out:  1150    Subjective:  Pt presents to physical therapy without device    Exercises:  Exercise/Equipment Resistance/Repetitions Other comments     bike 8 min Seat @ 7     Heel slides with PFB x10 reps 8/3 HEP     Quad sets 3 sec x 10 reps 8/3 HEP     SAQ 3 sec x 10 reps 8/3 HEP     SLR 3 sec x 10 reps 8/3 HEP     Seated knee flex OTB x 10 reps      Sit to stand X 10 reps  8/3 HEP     Hip 3 way X 10 reps B  8/3 HEP     Step-ups Fwd x 10 reps  Side x 10 reps                                                                      Other :      Home Exercise Program:  8/3/21: pt was provided w/ and it was reviewed w/pt HEP handout.   Pt demonstrated understanding to therapist.    Manual Treatments:  N/A    Modalities:  N/A     Time-in Time-out Total Time   57653  Evaluation Low Complexity      90812  Evaluation Med Complexity      04042  Evaluation High Complexity      92256  Ther Ex 1123 1150 27   44175  Neuro Re-ed        82050  Ther Activities        04818  Manual Therapy       59809  E-stim       17780  Ultrasound            Session 3423 0169 10       Treatment/Activity Tolerance:  [x] Patient tolerated treatment well [] Patient limited by fatigue  [] Patient limited by pain  [] Patient limited by other medical complications  [] Other:     Prognosis: [x] Good [] Fair  [] Poor    Patient Requires Follow-up: [x] Yes  [] No    Plan:   [x] Continue per plan of care [] Alter current plan (see comments)  [] Plan of care initiated [] Hold pending MD visit [] Discharge  Plan for Next Session:        Electronically signed by:  Leigh Johnson, [id number] [facility], [id number]

## 2021-08-12 ENCOUNTER — TREATMENT (OUTPATIENT)
Dept: PHYSICAL THERAPY | Age: 51
End: 2021-08-12
Payer: COMMERCIAL

## 2021-08-12 DIAGNOSIS — Z96.651 S/P TOTAL KNEE ARTHROPLASTY, RIGHT: Primary | ICD-10-CM

## 2021-08-12 PROCEDURE — 97110 THERAPEUTIC EXERCISES: CPT | Performed by: PHYSICAL THERAPIST

## 2021-08-12 NOTE — PROGRESS NOTES
[facility]          Phone: [phone] Fax: [phone]    Physical Therapy Daily Treatment Note  Date:  2021    Patient Name:  Jasmin Mancilla    :  1970  MRN: <[id number]>    Restrictions/Precautions:    Diagnosis:     Diagnosis Orders   1. S/P total knee arthroplasty, right       Treatment Diagnosis:    Insurance/Certification information:  [email or web address]  Referring Physician:  Koby Hernandez MD  Plan of care signed (Y/N):    Visit# / total visits:    Pain level: 3-9/10 R knee activity dependant  Time In:  1118  Time Out:  1152    Subjective:  Pt reports knee has been sore. Exercises:  Exercise/Equipment Resistance/Repetitions Other comments     bike 8 min Seat @ 7     Heel slides with PFB x10 reps 8/3 HEP     Quad sets 3 sec 2 x 10 reps 8/3 HEP     SAQ 3 sec 2 x 10 reps 8/3 HEP     SLR 3 sec x 15 reps 8/3 HEP     Seated knee flex OTB 2 x 10 reps      Sit to stand X 10 reps  8/3 HEP     Hip 3 way X 10 reps B  8/3 HEP     Step-ups Fwd x 10 reps  Side x 10 reps                                                                      Other :      Home Exercise Program:  8/3/21: pt was provided w/ and it was reviewed w/pt HEP handout.   Pt demonstrated understanding to therapist.    Manual Treatments:  N/A    Modalities:  N/A     Time-in Time-out Total Time   88050  Evaluation Low Complexity      30485  Evaluation Med Complexity      05399  Evaluation High Complexity      75742  Ther Ex 1118 1152 34   21204  Neuro Re-ed        21348  Ther Activities        78652  Manual Therapy       60322  E-stim       66047  Ultrasound            Session 5729 2393 35       Treatment/Activity Tolerance:  [x] Patient tolerated treatment well [] Patient limited by fatigue  [] Patient limited by pain  [] Patient limited by other medical complications  [] Other:     Prognosis: [x] Good [] Fair  [] Poor    Patient Requires Follow-up: [x] Yes  [] No    Plan:   [x] Continue per plan of care []

## 2021-08-16 ENCOUNTER — TREATMENT (OUTPATIENT)
Dept: PHYSICAL THERAPY | Age: 51
End: 2021-08-16
Payer: COMMERCIAL

## 2021-08-16 DIAGNOSIS — Z96.651 S/P TOTAL KNEE ARTHROPLASTY, RIGHT: Primary | ICD-10-CM

## 2021-08-16 PROCEDURE — 97110 THERAPEUTIC EXERCISES: CPT

## 2021-08-16 NOTE — PROGRESS NOTES
[facility]          Phone: [phone] Fax: [phone]    Physical Therapy Daily Treatment Note  Date:  2021    Patient Name:  Joshua Gonzalez    :  1970  MRN: <[id number]>    Restrictions/Precautions:    Diagnosis:     Diagnosis Orders   1. S/P total knee arthroplasty, right       Treatment Diagnosis:    Insurance/Certification information:  [facility]  Referring Physician:  Kianna Aguilar MD  Plan of care signed (Y/N):    Visit# / total visits:    Pain level: 3/10 R knee   Time In:  2392  Time Out:  1105    Subjective:  Pt reports knee has been sore. Exercises:  Exercise/Equipment Resistance/Repetitions Other comments     bike 8 min Seat @ 7     Heel slides with PFB x10 reps 8/3 HEP     Quad sets 3 sec 2 x 10 reps 8/3 HEP     SAQ 3 sec 2 x 10 reps 8/3 HEP     SLR 3 sec x 15 reps 8/3 HEP     Seated knee flex OTB 2 x 10 reps      Sit to stand X 10 reps  8/3 HEP     Hip 3 way X 10 reps B  8/3 HEP     Step-ups Fwd x 10 reps  Side x 10 reps                                                                      Other :      Home Exercise Program:  8/3/21: pt was provided w/ and it was reviewed w/pt HEP handout.   Pt demonstrated understanding to therapist.    Manual Treatments:  N/A    Modalities:  N/A     Time-in Time-out Total Time   83356  Evaluation Low Complexity      72160  Evaluation Med Complexity      17928  Evaluation High Complexity      44917  Ther Ex 1038 1105 27   93535  Neuro Re-ed        55476  Ther Activities        15649  Manual Therapy       29826  E-stim       45005  Ultrasound            Session 4329 9831 98       Treatment/Activity Tolerance:  [x] Patient tolerated treatment well [] Patient limited by fatigue  [] Patient limited by pain  [] Patient limited by other medical complications  [] Other:     Prognosis: [x] Good [] Fair  [] Poor    Patient Requires Follow-up: [x] Yes  [] No    Plan:   [x] Continue per plan of care [] Alter current plan (see comments)  [] Plan of care initiated [] Hold pending MD visit [] Discharge  Plan for Next Session:        Electronically signed by:  Jorge Kenyon, PTA [id number]

## 2021-08-30 ENCOUNTER — OFFICE VISIT (OUTPATIENT)
Dept: ORTHOPEDIC SURGERY | Age: 51
End: 2021-08-30

## 2021-08-30 VITALS — BODY MASS INDEX: 28.66 KG/M2 | HEIGHT: 60 IN | WEIGHT: 146 LBS

## 2021-08-30 DIAGNOSIS — Z96.651 STATUS POST TOTAL RIGHT KNEE REPLACEMENT: Primary | ICD-10-CM

## 2021-08-30 DIAGNOSIS — M17.11 OSTEOARTHRITIS OF RIGHT KNEE, UNSPECIFIED OSTEOARTHRITIS TYPE: ICD-10-CM

## 2021-08-30 PROCEDURE — 99024 POSTOP FOLLOW-UP VISIT: CPT | Performed by: ORTHOPAEDIC SURGERY

## 2021-08-30 RX ORDER — HYDROCODONE BITARTRATE AND ACETAMINOPHEN 5; 325 MG/1; MG/1
1 TABLET ORAL EVERY 6 HOURS PRN
Qty: 28 TABLET | Refills: 0 | Status: SHIPPED | OUTPATIENT
Start: 2021-08-30 | End: 2021-09-06

## 2021-08-30 NOTE — PROGRESS NOTES
Follow Up Post Operative Visit     Surgery: Right Jake robot assisted total knee arthroplasty  Date: 7/12/2021    Subjective:    Rafa Mcclendon is here for follow up visit s/p above procedure. She is doing well. She has been making good progress with PT. Still complaining of a fair amount of pain mainly after PT    Controlled Substances Monitoring:        Physical Exam:    Height: 5' (1.524 m), Weight: 146 lb (66.2 kg)    General: Alert and oriented x3, no acute distress  Cardiovascular/pulmonary: No labored breathing, peripheral perfusion intact  Musculoskeletal:    Range of motion is 5 to 130 degrees. The knee is grossly stable. Incision is healed, mildly sensitive. Imaging: No new images. Previous images reviewed    Assessment and Plan: 6 weeks out from right knee Jake robot assisted total knee arthroplasty  She is doing well. She will continue PT. She will be given 1 more prescription for pain medication as she cannot tolerate anti-inflammatories due to GI issues. We will see her back in 6 weeks with images.     Elvira Mathew MD  Orthopaedic Surgery   8/30/21  2:55 PM

## 2021-08-31 ENCOUNTER — TREATMENT (OUTPATIENT)
Dept: PHYSICAL THERAPY | Age: 51
End: 2021-08-31
Payer: COMMERCIAL

## 2021-08-31 DIAGNOSIS — Z96.651 S/P TOTAL KNEE ARTHROPLASTY, RIGHT: Primary | ICD-10-CM

## 2021-08-31 PROCEDURE — 97110 THERAPEUTIC EXERCISES: CPT

## 2021-08-31 NOTE — PROGRESS NOTES
[facility]          Phone: [phone] Fax: [phone]    Physical Therapy Daily Treatment Note  Date:  2021    Patient Name:  Alla Caldwell    :  1970  MRN: <[id number]>    Restrictions/Precautions:    Diagnosis:     Diagnosis Orders   1. S/P total knee arthroplasty, right       Treatment Diagnosis:    Insurance/Certification information:  [employer]  Referring Physician:  Juan A Casey MD  Plan of care signed (Y/N):    Visit# / total visits:    Pain level: 2-3/10 R knee   Time In:  1038  Time Out:  1103    Subjective:  Pt reports knee has been sore. Exercises:  Exercise/Equipment Resistance/Repetitions Other comments     bike 8 min Seat @ 7     Heel slides with PFB x10 reps 8/3 HEP     Quad sets 3 sec 2 x 10 reps 8/3 HEP     SAQ 1.5 #  2 x 10 reps 8/3 HEP     SLR 1.5 # 2 x 10 reps 8/3 HEP     Seated knee flex OTB 2 x 10 reps      Sit to stand X 10 reps  8/3 HEP     Hip 3 way X 15 reps B  8/3 HEP     Step-ups Fwd x 15 reps  Side x 15 reps              21 : R knee 0-107°                                                        Other :      Home Exercise Program:  8/3/21: pt was provided w/ and it was reviewed w/pt HEP handout.   Pt demonstrated understanding to therapist.    Manual Treatments:  N/A    Modalities:  N/A     Time-in Time-out Total Time   00897  Evaluation Low Complexity      41648  Evaluation Med Complexity      20814  Evaluation High Complexity      61015  Ther Ex 1038 1103 25   12472  Neuro Re-ed        73819  Ther Activities        99932  Manual Therapy       10316  E-stim       10037  Ultrasound            Session 1347 2407 25       Treatment/Activity Tolerance:  [x] Patient tolerated treatment well [] Patient limited by fatigue  [] Patient limited by pain  [] Patient limited by other medical complications  [] Other:     Prognosis: [x] Good [] Fair  [] Poor    Patient Requires Follow-up: [x] Yes  [] No    Plan:   [x] Continue per plan of care [] Alter current plan (see comments)  [] Plan of care initiated [] Hold pending MD visit [] Discharge  Plan for Next Session:        Electronically signed by:  Robbi Parnell, PTA [id number]

## 2021-09-02 ENCOUNTER — TREATMENT (OUTPATIENT)
Dept: PHYSICAL THERAPY | Age: 51
End: 2021-09-02
Payer: COMMERCIAL

## 2021-09-02 DIAGNOSIS — Z96.651 S/P TOTAL KNEE ARTHROPLASTY, RIGHT: Primary | ICD-10-CM

## 2021-09-02 PROCEDURE — 97110 THERAPEUTIC EXERCISES: CPT | Performed by: PHYSICAL THERAPIST

## 2021-09-22 ENCOUNTER — OFFICE VISIT (OUTPATIENT)
Dept: FAMILY MEDICINE CLINIC | Age: 51
End: 2021-09-22
Payer: COMMERCIAL

## 2021-09-22 VITALS
HEART RATE: 90 BPM | HEIGHT: 60 IN | BODY MASS INDEX: 29.31 KG/M2 | SYSTOLIC BLOOD PRESSURE: 143 MMHG | OXYGEN SATURATION: 98 % | WEIGHT: 149.3 LBS | TEMPERATURE: 98.3 F | DIASTOLIC BLOOD PRESSURE: 87 MMHG | RESPIRATION RATE: 18 BRPM

## 2021-09-22 DIAGNOSIS — M54.41 CHRONIC BILATERAL LOW BACK PAIN WITH BILATERAL SCIATICA: ICD-10-CM

## 2021-09-22 DIAGNOSIS — J44.1 COPD EXACERBATION (HCC): Primary | ICD-10-CM

## 2021-09-22 DIAGNOSIS — M54.42 CHRONIC BILATERAL LOW BACK PAIN WITH BILATERAL SCIATICA: ICD-10-CM

## 2021-09-22 DIAGNOSIS — J44.9 STEROID-DEPENDENT COPD (HCC): ICD-10-CM

## 2021-09-22 DIAGNOSIS — G89.29 CHRONIC BILATERAL LOW BACK PAIN WITH BILATERAL SCIATICA: ICD-10-CM

## 2021-09-22 DIAGNOSIS — Z23 NEED FOR IMMUNIZATION AGAINST INFLUENZA: ICD-10-CM

## 2021-09-22 DIAGNOSIS — Z12.31 BREAST CANCER SCREENING BY MAMMOGRAM: ICD-10-CM

## 2021-09-22 DIAGNOSIS — L40.50 PSORIATIC ARTHRITIS (HCC): ICD-10-CM

## 2021-09-22 PROBLEM — J20.5 RSV BRONCHITIS: Status: RESOLVED | Noted: 2019-12-30 | Resolved: 2021-09-22

## 2021-09-22 PROCEDURE — 99204 OFFICE O/P NEW MOD 45 MIN: CPT | Performed by: STUDENT IN AN ORGANIZED HEALTH CARE EDUCATION/TRAINING PROGRAM

## 2021-09-22 PROCEDURE — 90471 IMMUNIZATION ADMIN: CPT | Performed by: STUDENT IN AN ORGANIZED HEALTH CARE EDUCATION/TRAINING PROGRAM

## 2021-09-22 PROCEDURE — 90674 CCIIV4 VAC NO PRSV 0.5 ML IM: CPT | Performed by: STUDENT IN AN ORGANIZED HEALTH CARE EDUCATION/TRAINING PROGRAM

## 2021-09-22 RX ORDER — AZITHROMYCIN 250 MG/1
250 TABLET, FILM COATED ORAL SEE ADMIN INSTRUCTIONS
Qty: 6 TABLET | Refills: 0 | Status: SHIPPED | OUTPATIENT
Start: 2021-09-22 | End: 2021-09-27

## 2021-09-22 SDOH — ECONOMIC STABILITY: FOOD INSECURITY: WITHIN THE PAST 12 MONTHS, THE FOOD YOU BOUGHT JUST DIDN'T LAST AND YOU DIDN'T HAVE MONEY TO GET MORE.: NEVER TRUE

## 2021-09-22 SDOH — ECONOMIC STABILITY: FOOD INSECURITY: WITHIN THE PAST 12 MONTHS, YOU WORRIED THAT YOUR FOOD WOULD RUN OUT BEFORE YOU GOT MONEY TO BUY MORE.: NEVER TRUE

## 2021-09-22 ASSESSMENT — SOCIAL DETERMINANTS OF HEALTH (SDOH): HOW HARD IS IT FOR YOU TO PAY FOR THE VERY BASICS LIKE FOOD, HOUSING, MEDICAL CARE, AND HEATING?: NOT HARD AT ALL

## 2021-09-22 NOTE — PROGRESS NOTES
Patient:  Agapito Habermann 45 y.o. female     Date of Service: 9/22/21      Chiefcomplaint:   Chief Complaint   Patient presents with   Ness Matamoros New Doctor    COPD     History of Present Illness     COPD - chronically on prednisone. Also on trelegy. Uses nebulized albuterol. Change in color and amount of sputum recently. She feels like she is wheezing a little bit more than usual.   Smoker - currently 2 packs per day. Has tried everything to quit  GERD - ppi - 2 20mg. No ulcers. No scopes in a long time. NSAIDs cause her a lot of discomfort. Taking tylenol 8 pills per day - 4g. Dexa - 5/21 - bmd    Pain - associated with what she says is psoriatic arthritis. Used to follow with rheumatology but it was too expensive. Does not want to do humira due to concern for risk of leukemia which her mom had. Says she has tried other drugs as well. Prednisone does help. Previously on norco. Taken off abruptly when dr. Lady Hernandez left. Had injections in her neck at some point in the past. This was helpful for about 6 months. This may have been at [facility].      Leg cramping - had emg that was normal 6/21- numbness and weakness at times     Pertinent Medical, Family, Surgical, Social History:  Past Medical History:   Diagnosis Date    Anxiety     Arthritis     Asthma     Chronic back pain     Chronic lower back pain     Chronic neck pain     COPD     COPD exacerbation ([address] 12/26/2019    Difficult intravenous access     and venipuncture    Fibrocystic breast changes     GERD (gastroesophageal reflux disease)     Hiatal hernia    Herpes zoster ophthalmicus     Irritable bowel syndrome     Obesity     On supplemental oxygen therapy     1l/min/nc nightly and daily prn    Psoriasis     Right knee pain 07/2021    for Vergil Dat 07/2021    RSV bronchitis 12/30/2019    Tinea versicolor     Wears contact lenses     Wears dentures     upper    Wears glasses      Physical Exam   Vitals: BP (!) 143/87 (Site: [facility] [place]  5. Need for immunization against influenza  -     INFLUENZA, MDCK QUADV, 2 YRS AND OLDER, IM, PF, PREFILL SYR OR SDV, 0.5ML (FLUCELVAX QUADV, PF)  6. Breast cancer screening by mammogram  -     SUN DIGITAL SCREEN BILATERAL PER PROTOCOL; Future      Return in about 3 months (around 12/22/2021). Sharolyn Seip, DO     This document may have been prepared at least partially through the use of voice recognition software. Although effort is taken to assure the accuracy of this document, it is possible that grammatical, syntax,  or spelling errors may occur.

## 2021-11-27 ENCOUNTER — APPOINTMENT (OUTPATIENT)
Dept: GENERAL RADIOLOGY | Age: 51
DRG: 871 | End: 2021-11-27
Payer: COMMERCIAL

## 2021-11-27 ENCOUNTER — HOSPITAL ENCOUNTER (INPATIENT)
Age: 51
LOS: 5 days | Discharge: HOME | DRG: 871 | End: 2021-12-03
Attending: EMERGENCY MEDICINE | Admitting: STUDENT IN AN ORGANIZED HEALTH CARE EDUCATION/TRAINING PROGRAM
Payer: COMMERCIAL

## 2021-11-27 DIAGNOSIS — J96.00 ACUTE RESPIRATORY FAILURE, UNSPECIFIED WHETHER WITH HYPOXIA OR HYPERCAPNIA (HCC): Primary | ICD-10-CM

## 2021-11-27 PROCEDURE — 6360000002 HC RX W HCPCS

## 2021-11-27 PROCEDURE — 96375 TX/PRO/DX INJ NEW DRUG ADDON: CPT

## 2021-11-27 PROCEDURE — 31500 INSERT EMERGENCY AIRWAY: CPT

## 2021-11-27 PROCEDURE — 94640 AIRWAY INHALATION TREATMENT: CPT

## 2021-11-27 PROCEDURE — 99285 EMERGENCY DEPT VISIT HI MDM: CPT

## 2021-11-27 PROCEDURE — 5A1945Z RESPIRATORY VENTILATION, 24-96 CONSECUTIVE HOURS: ICD-10-PCS | Performed by: EMERGENCY MEDICINE

## 2021-11-27 PROCEDURE — 0BH17EZ INSERTION OF ENDOTRACHEAL AIRWAY INTO TRACHEA, VIA NATURAL OR ARTIFICIAL OPENING: ICD-10-PCS | Performed by: EMERGENCY MEDICINE

## 2021-11-27 PROCEDURE — 85025 COMPLETE CBC W/AUTO DIFF WBC: CPT

## 2021-11-27 PROCEDURE — 6370000000 HC RX 637 (ALT 250 FOR IP): Performed by: EMERGENCY MEDICINE

## 2021-11-27 PROCEDURE — 71045 X-RAY EXAM CHEST 1 VIEW: CPT

## 2021-11-27 PROCEDURE — 94002 VENT MGMT INPAT INIT DAY: CPT

## 2021-11-27 PROCEDURE — 96374 THER/PROPH/DIAG INJ IV PUSH: CPT

## 2021-11-27 RX ORDER — IPRATROPIUM BROMIDE AND ALBUTEROL SULFATE 2.5; .5 MG/3ML; MG/3ML
1 SOLUTION RESPIRATORY (INHALATION)
Status: COMPLETED | OUTPATIENT
Start: 2021-11-27 | End: 2021-11-27

## 2021-11-27 RX ORDER — LORAZEPAM 2 MG/ML
INJECTION INTRAMUSCULAR
Status: COMPLETED
Start: 2021-11-27 | End: 2021-11-27

## 2021-11-27 RX ORDER — KETAMINE HYDROCHLORIDE 100 MG/ML
INJECTION, SOLUTION INTRAMUSCULAR; INTRAVENOUS
Status: COMPLETED
Start: 2021-11-27 | End: 2021-11-28

## 2021-11-27 RX ORDER — MIDAZOLAM HYDROCHLORIDE 1 MG/ML
INJECTION INTRAMUSCULAR; INTRAVENOUS
Status: COMPLETED
Start: 2021-11-27 | End: 2021-11-28

## 2021-11-27 RX ORDER — SODIUM CHLORIDE 9 MG/ML
INJECTION, SOLUTION INTRAVENOUS CONTINUOUS
Status: DISCONTINUED | OUTPATIENT
Start: 2021-11-27 | End: 2021-11-30

## 2021-11-27 RX ORDER — PROPOFOL 10 MG/ML
5-50 INJECTION, EMULSION INTRAVENOUS ONCE
Status: COMPLETED | OUTPATIENT
Start: 2021-11-28 | End: 2021-11-28

## 2021-11-27 RX ORDER — SUCCINYLCHOLINE CHLORIDE 20 MG/ML
INJECTION INTRAMUSCULAR; INTRAVENOUS
Status: COMPLETED
Start: 2021-11-27 | End: 2021-11-28

## 2021-11-27 RX ADMIN — IPRATROPIUM BROMIDE AND ALBUTEROL SULFATE 1 AMPULE: .5; 3 SOLUTION RESPIRATORY (INHALATION) at 22:36

## 2021-11-27 RX ADMIN — LORAZEPAM 0.5 MG: 2 INJECTION INTRAMUSCULAR; INTRAVENOUS at 23:19

## 2021-11-27 RX ADMIN — IPRATROPIUM BROMIDE AND ALBUTEROL SULFATE 1 AMPULE: .5; 3 SOLUTION RESPIRATORY (INHALATION) at 22:38

## 2021-11-27 RX ADMIN — IPRATROPIUM BROMIDE AND ALBUTEROL SULFATE 1 AMPULE: .5; 3 SOLUTION RESPIRATORY (INHALATION) at 22:37

## 2021-11-27 ASSESSMENT — PULMONARY FUNCTION TESTS: PIF_VALUE: 39

## 2021-11-28 ENCOUNTER — APPOINTMENT (OUTPATIENT)
Dept: GENERAL RADIOLOGY | Age: 51
DRG: 871 | End: 2021-11-28
Payer: COMMERCIAL

## 2021-11-28 ENCOUNTER — APPOINTMENT (OUTPATIENT)
Dept: CT IMAGING | Age: 51
DRG: 871 | End: 2021-11-28
Payer: COMMERCIAL

## 2021-11-28 PROBLEM — J96.02 ACUTE RESPIRATORY FAILURE WITH HYPOXIA AND HYPERCAPNIA (HCC): Status: ACTIVE | Noted: 2021-11-28

## 2021-11-28 PROBLEM — J96.01 ACUTE RESPIRATORY FAILURE WITH HYPOXIA AND HYPERCAPNIA (HCC): Status: ACTIVE | Noted: 2021-11-28

## 2021-11-28 LAB
AADO2: 166.7 MMHG
AADO2: 89.2 MMHG
AADO2: 91.5 MMHG
ADENOVIRUS BY PCR: NOT DETECTED
ALBUMIN SERPL-MCNC: 3 G/DL (ref 3.5–5.2)
ALBUMIN SERPL-MCNC: 3.2 G/DL (ref 3.5–5.2)
ALBUMIN SERPL-MCNC: 3.4 G/DL (ref 3.5–5.2)
ALP BLD-CCNC: 112 U/L (ref 35–104)
ALP BLD-CCNC: 121 U/L (ref 35–104)
ALP BLD-CCNC: 125 U/L (ref 35–104)
ALT SERPL-CCNC: 52 U/L (ref 0–32)
ALT SERPL-CCNC: 75 U/L (ref 0–32)
ALT SERPL-CCNC: 76 U/L (ref 0–32)
ANION GAP SERPL CALCULATED.3IONS-SCNC: 12 MMOL/L (ref 7–16)
ANION GAP SERPL CALCULATED.3IONS-SCNC: 13 MMOL/L (ref 7–16)
ANION GAP SERPL CALCULATED.3IONS-SCNC: 14 MMOL/L (ref 7–16)
ANISOCYTOSIS: ABNORMAL
AST SERPL-CCNC: 101 U/L (ref 0–31)
AST SERPL-CCNC: 103 U/L (ref 0–31)
AST SERPL-CCNC: 104 U/L (ref 0–31)
B.E.: -2.1 MMOL/L (ref -3–3)
B.E.: 2.6 MMOL/L (ref -3–3)
B.E.: 5.5 MMOL/L (ref -3–3)
BASOPHILS ABSOLUTE: 0.19 E9/L (ref 0–0.2)
BASOPHILS RELATIVE PERCENT: 0.8 % (ref 0–2)
BILIRUB SERPL-MCNC: 0.2 MG/DL (ref 0–1.2)
BILIRUB SERPL-MCNC: 0.2 MG/DL (ref 0–1.2)
BILIRUB SERPL-MCNC: <0.2 MG/DL (ref 0–1.2)
BORDETELLA PARAPERTUSSIS BY PCR: NOT DETECTED
BORDETELLA PERTUSSIS BY PCR: NOT DETECTED
BUN BLDV-MCNC: 10 MG/DL (ref 6–20)
BUN BLDV-MCNC: 11 MG/DL (ref 6–20)
BUN BLDV-MCNC: 9 MG/DL (ref 6–20)
CALCIUM SERPL-MCNC: 8.3 MG/DL (ref 8.6–10.2)
CALCIUM SERPL-MCNC: 8.4 MG/DL (ref 8.6–10.2)
CALCIUM SERPL-MCNC: 8.7 MG/DL (ref 8.6–10.2)
CHLAMYDOPHILIA PNEUMONIAE BY PCR: NOT DETECTED
CHLORIDE BLD-SCNC: 101 MMOL/L (ref 98–107)
CHLORIDE BLD-SCNC: 101 MMOL/L (ref 98–107)
CHLORIDE BLD-SCNC: 99 MMOL/L (ref 98–107)
CHOLESTEROL, TOTAL: 150 MG/DL (ref 0–199)
CO2: 26 MMOL/L (ref 22–29)
CO2: 27 MMOL/L (ref 22–29)
CO2: 27 MMOL/L (ref 22–29)
COHB: 1.3 % (ref 0–1.5)
COHB: 1.6 % (ref 0–1.5)
COHB: 1.7 % (ref 0–1.5)
COMMENT: ABNORMAL
CORONAVIRUS 229E BY PCR: NOT DETECTED
CORONAVIRUS HKU1 BY PCR: NOT DETECTED
CORONAVIRUS NL63 BY PCR: NOT DETECTED
CORONAVIRUS OC43 BY PCR: NOT DETECTED
CREAT SERPL-MCNC: 0.7 MG/DL (ref 0.5–1)
CREAT SERPL-MCNC: 0.7 MG/DL (ref 0.5–1)
CREAT SERPL-MCNC: 0.8 MG/DL (ref 0.5–1)
CRITICAL: ABNORMAL
DATE ANALYZED: ABNORMAL
DATE OF COLLECTION: ABNORMAL
EKG ATRIAL RATE: 117 BPM
EKG P AXIS: 82 DEGREES
EKG P-R INTERVAL: 114 MS
EKG Q-T INTERVAL: 320 MS
EKG QRS DURATION: 72 MS
EKG QTC CALCULATION (BAZETT): 446 MS
EKG R AXIS: 56 DEGREES
EKG T AXIS: 76 DEGREES
EKG VENTRICULAR RATE: 117 BPM
EOSINOPHILS ABSOLUTE: 0 E9/L (ref 0.05–0.5)
EOSINOPHILS RELATIVE PERCENT: 0 % (ref 0–6)
FIO2: 40 %
FIO2: 40 %
FIO2: 50 %
GFR AFRICAN AMERICAN: >60
GFR NON-AFRICAN AMERICAN: >60 ML/MIN/1.73
GLUCOSE BLD-MCNC: 188 MG/DL (ref 74–99)
GLUCOSE BLD-MCNC: 191 MG/DL (ref 74–99)
GLUCOSE BLD-MCNC: 284 MG/DL (ref 74–99)
HBA1C MFR BLD: 6.2 % (ref 4–5.6)
HCO3: 27.8 MMOL/L (ref 22–26)
HCO3: 29.2 MMOL/L (ref 22–26)
HCO3: 30.8 MMOL/L (ref 22–26)
HCT VFR BLD CALC: 35.4 % (ref 34–48)
HCT VFR BLD CALC: 49.2 % (ref 34–48)
HDLC SERPL-MCNC: 59 MG/DL
HEMOGLOBIN: 11.6 G/DL (ref 11.5–15.5)
HEMOGLOBIN: 16.2 G/DL (ref 11.5–15.5)
HHB: 0.9 % (ref 0–5)
HHB: 1 % (ref 0–5)
HHB: 16.7 % (ref 0–5)
HUMAN METAPNEUMOVIRUS BY PCR: NOT DETECTED
HUMAN RHINOVIRUS/ENTEROVIRUS BY PCR: NOT DETECTED
INFLUENZA A BY PCR: NOT DETECTED
INFLUENZA B BY PCR: NOT DETECTED
L. PNEUMOPHILA SEROGP 1 UR AG: NORMAL
LAB: ABNORMAL
LACTIC ACID: 1.1 MMOL/L (ref 0.5–2.2)
LACTIC ACID: 1.7 MMOL/L (ref 0.5–2.2)
LACTIC ACID: 1.7 MMOL/L (ref 0.5–2.2)
LDL CHOLESTEROL CALCULATED: 20 MG/DL (ref 0–99)
LYMPHOCYTES ABSOLUTE: 3.98 E9/L (ref 1.5–4)
LYMPHOCYTES RELATIVE PERCENT: 17 % (ref 20–42)
Lab: ABNORMAL
MAGNESIUM: 1.5 MG/DL (ref 1.6–2.6)
MAGNESIUM: 1.5 MG/DL (ref 1.6–2.6)
MCH RBC QN AUTO: 33.5 PG (ref 26–35)
MCH RBC QN AUTO: 34.6 PG (ref 26–35)
MCHC RBC AUTO-ENTMCNC: 32.8 % (ref 32–34.5)
MCHC RBC AUTO-ENTMCNC: 32.9 % (ref 32–34.5)
MCV RBC AUTO: 101.7 FL (ref 80–99.9)
MCV RBC AUTO: 105.7 FL (ref 80–99.9)
METHB: 0.3 % (ref 0–1.5)
MODE: AC
MONOCYTES ABSOLUTE: 2.34 E9/L (ref 0.1–0.95)
MONOCYTES RELATIVE PERCENT: 10.2 % (ref 2–12)
MYCOPLASMA PNEUMONIAE BY PCR: NOT DETECTED
NEUTROPHILS ABSOLUTE: 16.85 E9/L (ref 1.8–7.3)
NEUTROPHILS RELATIVE PERCENT: 72 % (ref 43–80)
O2 CONTENT: 14.1 ML/DL
O2 CONTENT: 16.9 ML/DL
O2 CONTENT: 17.5 ML/DL
O2 SATURATION: 83 % (ref 92–98.5)
O2 SATURATION: 99 % (ref 92–98.5)
O2 SATURATION: 99.1 % (ref 92–98.5)
O2HB: 81.4 % (ref 94–97)
O2HB: 97.1 % (ref 94–97)
O2HB: 97.4 % (ref 94–97)
OPERATOR ID: 187
OPERATOR ID: 3342
OPERATOR ID: 3342
PARAINFLUENZA VIRUS 1 BY PCR: NOT DETECTED
PARAINFLUENZA VIRUS 2 BY PCR: NOT DETECTED
PARAINFLUENZA VIRUS 3 BY PCR: NOT DETECTED
PARAINFLUENZA VIRUS 4 BY PCR: NOT DETECTED
PATIENT TEMP: 37 C
PCO2: 47.9 MMHG (ref 35–45)
PCO2: 54.2 MMHG (ref 35–45)
PCO2: 75.3 MMHG (ref 35–45)
PDW BLD-RTO: 12.4 FL (ref 11.5–15)
PDW BLD-RTO: 12.7 FL (ref 11.5–15)
PEEP/CPAP: 5 CMH2O
PFO2: 1.15 MMHG/%
PFO2: 3.27 MMHG/%
PFO2: 3.36 MMHG/%
PH BLOOD GAS: 7.18 (ref 7.35–7.45)
PH BLOOD GAS: 7.35 (ref 7.35–7.45)
PH BLOOD GAS: 7.43 (ref 7.35–7.45)
PHOSPHORUS: 3 MG/DL (ref 2.5–4.5)
PLATELET # BLD: 254 E9/L (ref 130–450)
PLATELET # BLD: 364 E9/L (ref 130–450)
PMV BLD AUTO: 9.4 FL (ref 7–12)
PMV BLD AUTO: 9.5 FL (ref 7–12)
PO2: 130.9 MMHG (ref 75–100)
PO2: 167.8 MMHG (ref 75–100)
PO2: 46.2 MMHG (ref 75–100)
POLYCHROMASIA: ABNORMAL
POTASSIUM REFLEX MAGNESIUM: 3.4 MMOL/L (ref 3.5–5)
POTASSIUM SERPL-SCNC: 3.8 MMOL/L (ref 3.5–5)
POTASSIUM SERPL-SCNC: 3.8 MMOL/L (ref 3.5–5)
PRO-BNP: 714 PG/ML (ref 0–125)
PROCALCITONIN: 0.07 NG/ML (ref 0–0.08)
RBC # BLD: 3.35 E12/L (ref 3.5–5.5)
RBC # BLD: 4.84 E12/L (ref 3.5–5.5)
REASON FOR REJECTION: NORMAL
REJECTED TEST: NORMAL
RESPIRATORY SYNCYTIAL VIRUS BY PCR: NOT DETECTED
RI(T): 361 %
RI(T): 55 %
RI(T): 68 %
RR MECHANICAL: 20 B/MIN
RR MECHANICAL: 26 B/MIN
RR MECHANICAL: 26 B/MIN
SARS-COV-2, PCR: NOT DETECTED
SODIUM BLD-SCNC: 138 MMOL/L (ref 132–146)
SODIUM BLD-SCNC: 141 MMOL/L (ref 132–146)
SODIUM BLD-SCNC: 141 MMOL/L (ref 132–146)
SOURCE, BLOOD GAS: ABNORMAL
STREP PNEUMONIAE ANTIGEN, URINE: NORMAL
THB: 12.2 G/DL (ref 11.5–16.5)
THB: 12.3 G/DL (ref 11.5–16.5)
THB: 12.6 G/DL (ref 11.5–16.5)
TIME ANALYZED: 1752
TIME ANALYZED: 47
TIME ANALYZED: 617
TOTAL PROTEIN: 6 G/DL (ref 6.4–8.3)
TOTAL PROTEIN: 6.1 G/DL (ref 6.4–8.3)
TOTAL PROTEIN: 6.1 G/DL (ref 6.4–8.3)
TRIGL SERPL-MCNC: 353 MG/DL (ref 0–149)
TROPONIN, HIGH SENSITIVITY: 129 NG/L (ref 0–9)
TROPONIN, HIGH SENSITIVITY: 56 NG/L (ref 0–9)
TROPONIN, HIGH SENSITIVITY: 72 NG/L (ref 0–9)
TSH SERPL DL<=0.05 MIU/L-ACNC: 0.4 UIU/ML (ref 0.27–4.2)
VLDLC SERPL CALC-MCNC: 71 MG/DL
VT MECHANICAL: 400 ML
WBC # BLD: 16.3 E9/L (ref 4.5–11.5)
WBC # BLD: 23.4 E9/L (ref 4.5–11.5)

## 2021-11-28 PROCEDURE — 6360000002 HC RX W HCPCS: Performed by: INTERNAL MEDICINE

## 2021-11-28 PROCEDURE — 0202U NFCT DS 22 TRGT SARS-COV-2: CPT

## 2021-11-28 PROCEDURE — 84443 ASSAY THYROID STIM HORMONE: CPT

## 2021-11-28 PROCEDURE — 6360000002 HC RX W HCPCS

## 2021-11-28 PROCEDURE — 80061 LIPID PANEL: CPT

## 2021-11-28 PROCEDURE — 84100 ASSAY OF PHOSPHORUS: CPT

## 2021-11-28 PROCEDURE — 94640 AIRWAY INHALATION TREATMENT: CPT

## 2021-11-28 PROCEDURE — 6360000004 HC RX CONTRAST MEDICATION: Performed by: RADIOLOGY

## 2021-11-28 PROCEDURE — 87040 BLOOD CULTURE FOR BACTERIA: CPT

## 2021-11-28 PROCEDURE — APPSS60 APP SPLIT SHARED TIME 46-60 MINUTES: Performed by: NURSE PRACTITIONER

## 2021-11-28 PROCEDURE — 6360000002 HC RX W HCPCS: Performed by: EMERGENCY MEDICINE

## 2021-11-28 PROCEDURE — 71045 X-RAY EXAM CHEST 1 VIEW: CPT

## 2021-11-28 PROCEDURE — 6370000000 HC RX 637 (ALT 250 FOR IP): Performed by: INTERNAL MEDICINE

## 2021-11-28 PROCEDURE — 36415 COLL VENOUS BLD VENIPUNCTURE: CPT

## 2021-11-28 PROCEDURE — 6360000002 HC RX W HCPCS: Performed by: STUDENT IN AN ORGANIZED HEALTH CARE EDUCATION/TRAINING PROGRAM

## 2021-11-28 PROCEDURE — 83605 ASSAY OF LACTIC ACID: CPT

## 2021-11-28 PROCEDURE — 2000000000 HC ICU R&B

## 2021-11-28 PROCEDURE — 2500000003 HC RX 250 WO HCPCS: Performed by: INTERNAL MEDICINE

## 2021-11-28 PROCEDURE — 2500000003 HC RX 250 WO HCPCS

## 2021-11-28 PROCEDURE — 93010 ELECTROCARDIOGRAM REPORT: CPT | Performed by: INTERNAL MEDICINE

## 2021-11-28 PROCEDURE — 82805 BLOOD GASES W/O2 SATURATION: CPT

## 2021-11-28 PROCEDURE — 2500000003 HC RX 250 WO HCPCS: Performed by: EMERGENCY MEDICINE

## 2021-11-28 PROCEDURE — 71275 CT ANGIOGRAPHY CHEST: CPT

## 2021-11-28 PROCEDURE — 83880 ASSAY OF NATRIURETIC PEPTIDE: CPT

## 2021-11-28 PROCEDURE — 99291 CRITICAL CARE FIRST HOUR: CPT | Performed by: INTERNAL MEDICINE

## 2021-11-28 PROCEDURE — 06HY33Z INSERTION OF INFUSION DEVICE INTO LOWER VEIN, PERCUTANEOUS APPROACH: ICD-10-PCS | Performed by: INTERNAL MEDICINE

## 2021-11-28 PROCEDURE — C9113 INJ PANTOPRAZOLE SODIUM, VIA: HCPCS | Performed by: INTERNAL MEDICINE

## 2021-11-28 PROCEDURE — 2580000003 HC RX 258: Performed by: EMERGENCY MEDICINE

## 2021-11-28 PROCEDURE — 74018 RADEX ABDOMEN 1 VIEW: CPT

## 2021-11-28 PROCEDURE — 93005 ELECTROCARDIOGRAM TRACING: CPT | Performed by: INTERNAL MEDICINE

## 2021-11-28 PROCEDURE — 93005 ELECTROCARDIOGRAM TRACING: CPT | Performed by: EMERGENCY MEDICINE

## 2021-11-28 PROCEDURE — 83735 ASSAY OF MAGNESIUM: CPT

## 2021-11-28 PROCEDURE — 84484 ASSAY OF TROPONIN QUANT: CPT

## 2021-11-28 PROCEDURE — 2580000003 HC RX 258: Performed by: INTERNAL MEDICINE

## 2021-11-28 PROCEDURE — 85027 COMPLETE CBC AUTOMATED: CPT

## 2021-11-28 PROCEDURE — 83036 HEMOGLOBIN GLYCOSYLATED A1C: CPT

## 2021-11-28 PROCEDURE — 84145 PROCALCITONIN (PCT): CPT

## 2021-11-28 PROCEDURE — 80053 COMPREHEN METABOLIC PANEL: CPT

## 2021-11-28 PROCEDURE — 87449 NOS EACH ORGANISM AG IA: CPT

## 2021-11-28 PROCEDURE — 94003 VENT MGMT INPAT SUBQ DAY: CPT

## 2021-11-28 RX ORDER — ONDANSETRON 4 MG/1
4 TABLET, ORALLY DISINTEGRATING ORAL EVERY 8 HOURS PRN
Status: DISCONTINUED | OUTPATIENT
Start: 2021-11-28 | End: 2021-11-30 | Stop reason: SDUPTHER

## 2021-11-28 RX ORDER — SODIUM CHLORIDE 0.9 % (FLUSH) 0.9 %
5-40 SYRINGE (ML) INJECTION PRN
Status: DISCONTINUED | OUTPATIENT
Start: 2021-11-28 | End: 2021-11-30 | Stop reason: SDUPTHER

## 2021-11-28 RX ORDER — MIDAZOLAM HYDROCHLORIDE 2 MG/2ML
2 INJECTION, SOLUTION INTRAMUSCULAR; INTRAVENOUS ONCE
Status: COMPLETED | OUTPATIENT
Start: 2021-11-28 | End: 2021-11-28

## 2021-11-28 RX ORDER — MAGNESIUM SULFATE 1 G/100ML
1000 INJECTION INTRAVENOUS PRN
Status: DISCONTINUED | OUTPATIENT
Start: 2021-11-28 | End: 2021-12-03 | Stop reason: HOSPADM

## 2021-11-28 RX ORDER — POLYETHYLENE GLYCOL 3350 17 G/17G
17 POWDER, FOR SOLUTION ORAL DAILY PRN
Status: DISCONTINUED | OUTPATIENT
Start: 2021-11-28 | End: 2021-12-03 | Stop reason: HOSPADM

## 2021-11-28 RX ORDER — MIDAZOLAM HYDROCHLORIDE 1 MG/ML
INJECTION INTRAMUSCULAR; INTRAVENOUS
Status: COMPLETED
Start: 2021-11-28 | End: 2021-11-28

## 2021-11-28 RX ORDER — FENTANYL CITRATE 50 UG/ML
100 INJECTION, SOLUTION INTRAMUSCULAR; INTRAVENOUS ONCE
Status: COMPLETED | OUTPATIENT
Start: 2021-11-28 | End: 2021-11-28

## 2021-11-28 RX ORDER — POTASSIUM CHLORIDE 29.8 MG/ML
20 INJECTION INTRAVENOUS PRN
Status: DISCONTINUED | OUTPATIENT
Start: 2021-11-28 | End: 2021-12-03 | Stop reason: HOSPADM

## 2021-11-28 RX ORDER — ONDANSETRON 2 MG/ML
4 INJECTION INTRAMUSCULAR; INTRAVENOUS EVERY 6 HOURS PRN
Status: DISCONTINUED | OUTPATIENT
Start: 2021-11-28 | End: 2021-11-30 | Stop reason: SDUPTHER

## 2021-11-28 RX ORDER — SODIUM CHLORIDE 9 MG/ML
25 INJECTION, SOLUTION INTRAVENOUS PRN
Status: DISCONTINUED | OUTPATIENT
Start: 2021-11-28 | End: 2021-11-30 | Stop reason: SDUPTHER

## 2021-11-28 RX ORDER — PANTOPRAZOLE SODIUM 40 MG/10ML
40 INJECTION, POWDER, LYOPHILIZED, FOR SOLUTION INTRAVENOUS DAILY
Status: DISCONTINUED | OUTPATIENT
Start: 2021-11-28 | End: 2021-11-30

## 2021-11-28 RX ORDER — ACETAMINOPHEN 650 MG/1
650 SUPPOSITORY RECTAL EVERY 6 HOURS PRN
Status: DISCONTINUED | OUTPATIENT
Start: 2021-11-28 | End: 2021-11-30

## 2021-11-28 RX ORDER — BUDESONIDE 0.5 MG/2ML
0.5 INHALANT ORAL 2 TIMES DAILY
Status: DISCONTINUED | OUTPATIENT
Start: 2021-11-28 | End: 2021-12-03 | Stop reason: HOSPADM

## 2021-11-28 RX ORDER — ARFORMOTEROL TARTRATE 15 UG/2ML
15 SOLUTION RESPIRATORY (INHALATION) 2 TIMES DAILY
Status: DISCONTINUED | OUTPATIENT
Start: 2021-11-28 | End: 2021-12-03 | Stop reason: HOSPADM

## 2021-11-28 RX ORDER — ACETAMINOPHEN 325 MG/1
650 TABLET ORAL EVERY 6 HOURS PRN
Status: DISCONTINUED | OUTPATIENT
Start: 2021-11-28 | End: 2021-11-30

## 2021-11-28 RX ORDER — PROPOFOL 10 MG/ML
INJECTION, EMULSION INTRAVENOUS
Status: COMPLETED
Start: 2021-11-28 | End: 2021-11-28

## 2021-11-28 RX ORDER — FENTANYL CITRATE 50 UG/ML
50 INJECTION, SOLUTION INTRAMUSCULAR; INTRAVENOUS ONCE
Status: DISCONTINUED | OUTPATIENT
Start: 2021-11-28 | End: 2021-11-30

## 2021-11-28 RX ORDER — FENTANYL CITRATE 50 UG/ML
INJECTION, SOLUTION INTRAMUSCULAR; INTRAVENOUS
Status: COMPLETED
Start: 2021-11-28 | End: 2021-11-28

## 2021-11-28 RX ORDER — SODIUM CHLORIDE 0.9 % (FLUSH) 0.9 %
5-40 SYRINGE (ML) INJECTION EVERY 12 HOURS SCHEDULED
Status: DISCONTINUED | OUTPATIENT
Start: 2021-11-28 | End: 2021-11-30 | Stop reason: SDUPTHER

## 2021-11-28 RX ORDER — METHYLPREDNISOLONE SODIUM SUCCINATE 40 MG/ML
40 INJECTION, POWDER, LYOPHILIZED, FOR SOLUTION INTRAMUSCULAR; INTRAVENOUS EVERY 12 HOURS
Status: DISCONTINUED | OUTPATIENT
Start: 2021-11-28 | End: 2021-11-30

## 2021-11-28 RX ORDER — PROPOFOL 10 MG/ML
5-50 INJECTION, EMULSION INTRAVENOUS
Status: DISCONTINUED | OUTPATIENT
Start: 2021-11-28 | End: 2021-11-30

## 2021-11-28 RX ORDER — ASPIRIN 81 MG/1
81 TABLET, CHEWABLE ORAL DAILY
Status: DISCONTINUED | OUTPATIENT
Start: 2021-11-28 | End: 2021-12-03 | Stop reason: HOSPADM

## 2021-11-28 RX ORDER — SODIUM CHLORIDE 0.9 % (FLUSH) 0.9 %
10 SYRINGE (ML) INJECTION PRN
Status: DISCONTINUED | OUTPATIENT
Start: 2021-11-28 | End: 2021-11-30

## 2021-11-28 RX ORDER — IPRATROPIUM BROMIDE AND ALBUTEROL SULFATE 2.5; .5 MG/3ML; MG/3ML
1 SOLUTION RESPIRATORY (INHALATION)
Status: DISCONTINUED | OUTPATIENT
Start: 2021-11-28 | End: 2021-12-03 | Stop reason: HOSPADM

## 2021-11-28 RX ADMIN — FENTANYL CITRATE 50 MCG: 50 INJECTION, SOLUTION INTRAMUSCULAR; INTRAVENOUS at 01:40

## 2021-11-28 RX ADMIN — FENTANYL CITRATE 100 MCG: 50 INJECTION INTRAMUSCULAR; INTRAVENOUS at 00:24

## 2021-11-28 RX ADMIN — PANTOPRAZOLE SODIUM 40 MG: 40 INJECTION, POWDER, FOR SOLUTION INTRAVENOUS at 08:13

## 2021-11-28 RX ADMIN — MIDAZOLAM 2 MG: 1 INJECTION INTRAMUSCULAR; INTRAVENOUS at 14:21

## 2021-11-28 RX ADMIN — Medication 10 ML: at 08:14

## 2021-11-28 RX ADMIN — FENTANYL CITRATE 100 MCG: 50 INJECTION, SOLUTION INTRAMUSCULAR; INTRAVENOUS at 00:24

## 2021-11-28 RX ADMIN — ARFORMOTEROL TARTRATE 15 MCG: 15 SOLUTION RESPIRATORY (INHALATION) at 06:13

## 2021-11-28 RX ADMIN — KETAMINE HYDROCHLORIDE: 100 INJECTION, SOLUTION, CONCENTRATE INTRAMUSCULAR; INTRAVENOUS at 01:22

## 2021-11-28 RX ADMIN — PROPOFOL 50 MCG/KG/MIN: 10 INJECTION, EMULSION INTRAVENOUS at 08:58

## 2021-11-28 RX ADMIN — Medication 50 MCG/HR: at 14:19

## 2021-11-28 RX ADMIN — IPRATROPIUM BROMIDE AND ALBUTEROL SULFATE 1 AMPULE: .5; 2.5 SOLUTION RESPIRATORY (INHALATION) at 11:03

## 2021-11-28 RX ADMIN — METHYLPREDNISOLONE SODIUM SUCCINATE 40 MG: 40 INJECTION, POWDER, FOR SOLUTION INTRAMUSCULAR; INTRAVENOUS at 02:46

## 2021-11-28 RX ADMIN — WATER 1000 MG: 1 INJECTION INTRAMUSCULAR; INTRAVENOUS; SUBCUTANEOUS at 02:46

## 2021-11-28 RX ADMIN — DOXYCYCLINE 100 MG: 100 INJECTION, POWDER, LYOPHILIZED, FOR SOLUTION INTRAVENOUS at 02:47

## 2021-11-28 RX ADMIN — IPRATROPIUM BROMIDE AND ALBUTEROL SULFATE 1 AMPULE: .5; 2.5 SOLUTION RESPIRATORY (INHALATION) at 19:55

## 2021-11-28 RX ADMIN — SODIUM CHLORIDE: 9 INJECTION, SOLUTION INTRAVENOUS at 00:05

## 2021-11-28 RX ADMIN — PROPOFOL 15 MCG/KG/MIN: 10 INJECTION, EMULSION INTRAVENOUS at 01:14

## 2021-11-28 RX ADMIN — DOXYCYCLINE 100 MG: 100 INJECTION, POWDER, LYOPHILIZED, FOR SOLUTION INTRAVENOUS at 13:37

## 2021-11-28 RX ADMIN — METHYLPREDNISOLONE SODIUM SUCCINATE 40 MG: 40 INJECTION, POWDER, FOR SOLUTION INTRAMUSCULAR; INTRAVENOUS at 13:38

## 2021-11-28 RX ADMIN — IPRATROPIUM BROMIDE AND ALBUTEROL SULFATE 1 AMPULE: .5; 2.5 SOLUTION RESPIRATORY (INHALATION) at 06:13

## 2021-11-28 RX ADMIN — PROPOFOL 1000 MG: 10 INJECTION, EMULSION INTRAVENOUS at 04:43

## 2021-11-28 RX ADMIN — ENOXAPARIN SODIUM 40 MG: 100 INJECTION SUBCUTANEOUS at 08:11

## 2021-11-28 RX ADMIN — ARFORMOTEROL TARTRATE 15 MCG: 15 SOLUTION RESPIRATORY (INHALATION) at 19:55

## 2021-11-28 RX ADMIN — PROPOFOL 50 MCG/KG/MIN: 10 INJECTION, EMULSION INTRAVENOUS at 18:19

## 2021-11-28 RX ADMIN — BUDESONIDE 500 MCG: 0.5 INHALANT RESPIRATORY (INHALATION) at 06:13

## 2021-11-28 RX ADMIN — IPRATROPIUM BROMIDE AND ALBUTEROL SULFATE 1 AMPULE: .5; 2.5 SOLUTION RESPIRATORY (INHALATION) at 16:29

## 2021-11-28 RX ADMIN — Medication 12.5 MCG/HR: at 01:15

## 2021-11-28 RX ADMIN — BUDESONIDE 500 MCG: 0.5 INHALANT RESPIRATORY (INHALATION) at 19:55

## 2021-11-28 RX ADMIN — SUCCINYLCHOLINE CHLORIDE 200 MG: 20 INJECTION, SOLUTION INTRAMUSCULAR; INTRAVENOUS at 00:06

## 2021-11-28 RX ADMIN — MIDAZOLAM 4 MG: 1 INJECTION INTRAMUSCULAR; INTRAVENOUS at 00:06

## 2021-11-28 RX ADMIN — PROPOFOL 50 MCG/KG/MIN: 10 INJECTION, EMULSION INTRAVENOUS at 14:10

## 2021-11-28 RX ADMIN — MIDAZOLAM 2 MG: 1 INJECTION INTRAMUSCULAR; INTRAVENOUS at 01:46

## 2021-11-28 RX ADMIN — MIDAZOLAM HYDROCHLORIDE 2 MG: 2 INJECTION, SOLUTION INTRAMUSCULAR; INTRAVENOUS at 14:21

## 2021-11-28 RX ADMIN — IOPAMIDOL 75 ML: 755 INJECTION, SOLUTION INTRAVENOUS at 05:06

## 2021-11-28 ASSESSMENT — PULMONARY FUNCTION TESTS
PIF_VALUE: 37
PIF_VALUE: 45
PIF_VALUE: 35
PIF_VALUE: 40
PIF_VALUE: 39
PIF_VALUE: 33
PIF_VALUE: 31

## 2021-11-28 NOTE — H&P
Inpatient H&P      PCP:  Rudi Sosa DO  Admitting Physician:  No admitting provider for patient encounter. Consultants:   Critical care  Chief Complaint:    Chief Complaint   Patient presents with    Shortness of Breath     hx COPD increasing SOB at home 79% on 1L O2 per EMS. came up to 91%6l then was placed on NRB came up to 100%. was given 5 breathing tx pta and just finished z pack. History of Present Illness  Arcenio Maurer is a 45 y.o. female who presents to [facility] ER complaining of shortness of breath. Arcenio Maurer has a past medical history that includes COPD, anxiety, arthritis, chronic back pain. History is limited due to patient condition. History obtained from EMR and ER physician. Aydee Hopper presented to the ER with complaint of shortness of breath. She was found to be 79% on 1 L of oxygen per EMS. She had to 91% on 6 L. She was giving 5 breathing treatments prior to arrival to the ER and had just finished an outpatient Z-Keegan. She had also been on steroids. She denied fever, chills, cough. She was vaccinated for Covid. She does have a history of COPD. Her respiratory status worsened in the ER and she was subsequently intubated. ER Course  Upon presentation to the ER, routine labwork was performed which revealed potassium 3.4, magnesium 1.5, glucose 234, calcium 8.3, troponin 56, albumin 3.0, alk phos 112, ALT 52, , WBC 23.4. Imaging results are as outlined below in the Imaging section of this note. EKG revealed sinus tachycardia. Upon arrival to the ER, patient was 179/119, tachycardic at 122.   The patient received duo nebs, Versed, propofol, succinylcholine, Ativan in the emergency room and was admitted under the care of [address] Admission -7/12/2021  Status post total right knee replacement using cement     Last Echocardiogram -4/11/2013    Normal left ventricle size and systolic function    Stage I diastolic dysfunction    Trace mitral regurgitation     ED TRIAGE VITALS  BP: (!) 179/119, Temp: 98.3 °F (36.8 °C), Pulse: 139, Resp: 26, SpO2: 100 %    Vitals:    11/27/21 2230 11/27/21 2344 11/27/21 2348   BP: (!) 179/119     Pulse: 122 139    Resp: 24 26    Temp: 98.3 °F (36.8 °C)     TempSrc: Oral     SpO2: 96% 100%    Weight:   150 lb (68 kg)         Histories  Past Medical History:   Diagnosis Date    Anxiety     Arthritis     Asthma     Chronic back pain     Chronic lower back pain     Chronic neck pain     COPD     COPD exacerbation (HCC) 12/26/2019    Difficult intravenous access     and venipuncture    Fibrocystic breast changes     GERD (gastroesophageal reflux disease)     Hiatal hernia    Herpes zoster ophthalmicus     Irritable bowel syndrome     Obesity     On supplemental oxygen therapy     1l/min/nc nightly and daily prn    Psoriasis     Right knee pain 07/2021    for Hazel Luis Eduardo 07/2021    RSV bronchitis 12/30/2019    Tinea versicolor     Wears contact lenses     Wears dentures     upper    Wears glasses      Past Surgical History:   Procedure Laterality Date    CHOLECYSTECTOMY  11/2003    HERNIA REPAIR  02/7521    umbilical    KNEE ARTHROSCOPY Right     LAPAROSCOPY      LEEP      LYMPH NODE BIOPSY      TOTAL KNEE ARTHROPLASTY Right 7/12/2021    RIGHT KNEE JOHNNIE ROBOTIC ASSISTED TOTAL ARTHROPLASTY  PNB performed [facility] R[facility] OR     Family History   Problem Relation Age of Onset    Cancer Mother 40        Non-Hodgkin Lymphoma    Diabetes Sister     High Blood Pressure Sister     Arthritis Sister         Gout    Arthritis Father     Psoriasis Father     Diabetes Father     High Blood Pressure Father     Kidney Disease Father     Heart Disease Father        Home Medications  Prior to Admission medications    Medication Sig Start Date End Date Taking?  Authorizing Provider   loratadine (CLARITIN) 10 MG tablet Take 10 mg by mouth daily    Historical Provider, MD   metoprolol tartrate (LOPRESSOR) 25 MG tablet Take 12.5 mg by mouth 2 times daily    Historical Provider, MD   guaiFENesin (MUCINEX MAXIMUM STRENGTH) 1200 MG TB12 Take 1 tablet by mouth 2 times daily     Historical Provider, MD   albuterol (PROVENTIL) (2.5 MG/3ML) 0.083% nebulizer solution Take 2.5 mg by nebulization every 4-6 hours as needed Pt states uses every morning and then prn 2/2/21   Historical Provider, MD   OXYGEN 1/l/min/nc at night and daily prn    Historical Provider, MD   predniSONE (DELTASONE) 10 MG tablet Take 10 mg by mouth daily    Historical Provider, MD   Fluticasone-Umeclidin-Vilant (TRELEGY ELLIPTA IN) Inhale 1 puff into the lungs daily    Historical Provider, MD   omeprazole (PRILOSEC) 20 MG delayed release capsule Take 40 mg by mouth daily    Historical Provider, MD   albuterol (PROVENTIL HFA;VENTOLIN HFA) 108 (90 BASE) MCG/ACT inhaler Inhale 2 puffs into the lungs every 6 hours as needed.     Historical Provider, MD       Allergies  Codeine, Sulfa antibiotics, and Demerol hcl [meperidine]    Social Hx  Social History     Socioeconomic History    Marital status:      Spouse name: Not on file    Number of children: Not on file    Years of education: Not on file    Highest education level: Not on file   Occupational History    Not on file   Tobacco Use    Smoking status: Current Every Day Smoker     Packs/day: 0.50     Years: 40.00     Pack years: 20.00     Types: Cigarettes    Smokeless tobacco: Never Used    Tobacco comment: down from 2 packs   Vaping Use    Vaping Use: Never used   Substance and Sexual Activity    Alcohol use: Yes     Comment: socially    Drug use: No    Sexual activity: Yes   Other Topics Concern    Not on file   Social History Narrative    Not on file     Social Determinants of Health     Financial Resource Strain: Low Risk     Difficulty of Paying Living Expenses: Not hard at all   Food Insecurity: No Food Insecurity    Worried About [address] in the Last Year: Never true    Ran Out of Food in the Last Year: Never true   Transportation Needs:     Lack of Transportation (Medical): Not on file    Lack of Transportation (Non-Medical): Not on file   Physical Activity:     Days of Exercise per Week: Not on file    Minutes of Exercise per Session: Not on file   Stress:     Feeling of Stress : Not on file   Social Connections:     Frequency of Communication with Friends and Family: Not on file    Frequency of Social Gatherings with Friends and Family: Not on file    Attends Church Services: Not on file    Active Member of [address] [place] [address] [employer] or Organizations: Not on file    Attends Club or Organization Meetings: Not on file    Marital Status: Not on file   Intimate Partner Violence:     Fear of Current or Ex-Partner: Not on file    Emotionally Abused: Not on file    Physically Abused: Not on file    Sexually Abused: Not on file   Housing Stability:     Unable to Pay for Housing in the Last Year: Not on file    Number of [place] in the Last Year: Not on file    Unstable Housing in the Last Year: Not on file       Review of Systems  Unable to be obtained due to patient status    Physical Examination  Vitals:  BP (!) 179/119   Pulse 139   Temp 98.3 °F (36.8 °C) (Oral)   Resp 26   Wt 150 lb (68 kg)   LMP 05/07/2021   SpO2 100%   BMI 29.29 kg/m²   General Appearance: Intubated, sedated  HEENT:  NCAT; PERRL; conjunctiva pink, sclera clear  Neck:  no adenopathy, bruit, JVD, tenderness, masses, or nodules; supple, symmetrical, trachea midline, thyroid not enlarged  Lung: Diminished bilaterally;   no rhonchi, rales, or crackles  Heart:  regular rate and regular rhythm without murmur, rub, or gallop  Abdomen:  soft, nontender, nondistended; normoactive bowel sounds; no organomegaly  Extremities:  extremities normal, atraumatic, no cyanosis or edema  Musculokeletal:  no joint swelling, no muscle tenderness. ROM normal in all joints of extremities.    Neurologic: Intubated, sedated    Laboratory Data  Recent Results (from the past 24 hour(s))   CBC auto differential    Collection Time: 11/27/21 10:45 PM   Result Value Ref Range    WBC 23.4 (H) 4.5 - 11.5 E9/L    RBC 4.84 3.50 - 5.50 E12/L    Hemoglobin 16.2 (H) 11.5 - 15.5 g/dL    Hematocrit 49.2 (H) 34.0 - 48.0 %    .7 (H) 80.0 - 99.9 fL    MCH 33.5 26.0 - 35.0 pg    MCHC 32.9 32.0 - 34.5 %    RDW 12.4 11.5 - 15.0 fL    Platelets 429 447 - 879 E9/L    MPV 9.5 7.0 - 12.0 fL   SPECIMEN REJECTION    Collection Time: 11/28/21 12:04 AM   Result Value Ref Range    Rejected Test CMP TROP     Reason for Rejection see below        Imaging  XR CHEST PORTABLE    Result Date: 11/27/2021  EXAMINATION: ONE XRAY VIEW OF THE CHEST 11/27/2021 9:45 pm COMPARISON: July 13 HISTORY: ORDERING SYSTEM PROVIDED HISTORY: sob TECHNOLOGIST PROVIDED HISTORY: Reason for exam:->sob What reading provider will be dictating this exam?->CRC FINDINGS: Cardiomediastinal silhouette within normal limits. Atelectatic changes in the lung bases. No airspace consolidation or pleural effusions. Pulmonary vasculature is within normal limits. Atelectatic changes in the lung bases bilaterally. No other radiographic evidence of acute cardiopulmonary disease. Assessment and Plan  Patient is a 45 y.o. female who presented with shortness of breath.    The active problem list is as follows:    Acute on chronic hypoxic and hypercapnic respiratory failure due to COPD exacerbation  -Intubated 11/27  -Continuous pulse ox monitoring  -Respiratory panel pending  -Strep pneumo and Legionella antigens  -Duonebs q4  -Antibiotics Rocephin and doxycycline  -Solu-Medrol  -Check CTA of the chest    Septic shock  -On IV antibiotics  -Wean Levophed as tolerated    Elevated troponin  -Likely due to hypoxia  -Repeat troponin    Hypokalemia  -Replace as needed    Elevated LFTs  -New from previous    Leukocytosis  -Check procalcitonin  -On IV antibiotics    Chronic back pain    · Routine labs in the morning. · DVT prophylaxis. · Please see orders for further management and care.         Ruth Van DO    12:45 AM  11/28/2021

## 2021-11-28 NOTE — PROGRESS NOTES
Brief progress note    Admission H&P done this morning reviewed. Patient seen and examined at bedside. Currently intubated and sedated. FiO2 40%/PEEP 5  Off of Levophed  Cardiology evaluation noted. No further intervention  Pulmonary/critical care evaluation awaited  Continue current management.   Currently awaiting ICU bed

## 2021-11-28 NOTE — ED NOTES
Pt vent alarming, entered room and found Pt with eyes open and reaching for ET tube. Pt redirected while obtaining new propofol bottle. Pt remained awake and alert once propofol restarted, fentanyl increased and PRN Versed order obtained. Soft restraints placed to bilat arms, Pt eyes closed, agitation eased, VS stable.       Teresa Love RN  11/28/21 [id number]

## 2021-11-28 NOTE — ED PROVIDER NOTES
HPI:  11/27/21, Time: 10:17 PM MOODY Everett is a 45 y.o. female presenting to the ED for shortness of breath, beginning 1 week ago. The complaint has been persistent, moderate in severity, and worsened by nothing. Patient reporting shortness of breath has been going on for the past several days she was just recently on a Zithromax pack. Patient reporting also being on steroids. Patient reporting no fever no chills she reports no productive cough she was vaccinated for Covid. Patient reporting no leg pain or swelling. Per EMS patient was hypoxic at home. Patient wears 1 to 2 L of oxygen at home she was hypoxic at 90%. Patient was brought in on nonrebreather. Patient per report was using sensory muscles. Patient reporting no vomiting no diarrhea or abdominal pain. ROS:   Pertinent positives and negatives are stated within HPI, all other systems reviewed and are negative.  --------------------------------------------- PAST HISTORY ---------------------------------------------  Past Medical History:  has a past medical history of Anxiety, Arthritis, Asthma, Chronic back pain, Chronic lower back pain, Chronic neck pain, COPD, COPD exacerbation ([facility] [address] Difficult intravenous access, Fibrocystic breast changes, GERD (gastroesophageal reflux disease), Herpes zoster ophthalmicus, Irritable bowel syndrome, Obesity, On supplemental oxygen therapy, Psoriasis, Right knee pain, RSV bronchitis, Tinea versicolor, Wears contact lenses, Wears dentures, and Wears glasses. Past Surgical History:  has a past surgical history that includes laparoscopy; Cholecystectomy (11/2003); LEEP; lymph node biopsy; Knee arthroscopy (Right); hernia repair (11/2003); and Total knee arthroplasty (Right, 7/12/2021). Social History:  reports that she has been smoking cigarettes. She has a 20.00 pack-year smoking history. She has never used smokeless tobacco. She reports current alcohol use.  She reports that she does not use drugs. Family History: family history includes Arthritis in her father and sister; Cancer (age of onset: 40) in her mother; Diabetes in her father and sister; Heart Disease in her father; High Blood Pressure in her father and sister; Kidney Disease in her father; Psoriasis in her father. The patients home medications have been reviewed. Allergies: Codeine, Sulfa antibiotics, and Demerol hcl [meperidine]    ---------------------------------------------------PHYSICAL EXAM--------------------------------------    Constitutional/General: Alert and oriented x3, mild distress  Head: Normocephalic and atraumatic  Eyes: PERRL, EOMI  Mouth: Oropharynx clear, handling secretions, no trismus  Neck: Supple, full ROM, non tender to palpation in the midline, no stridor, no crepitus, no meningeal signs  Pulmonary: Lungs diminished bilaterally mild distress  Cardiovascular: Tachycardic regular rhythm. No murmurs, gallops, or rubs. 2+ distal pulses  Chest: no chest wall tenderness  Abdomen: Soft. Non tender. Non distended. +BS. No rebound, guarding, or rigidity. No pulsatile masses appreciated. Musculoskeletal: Moves all extremities x 4. Warm and well perfused, no clubbing, cyanosis, or edema. Capillary refill <3 seconds  Skin: warm and dry. No rashes. Neurologic: GCS 15, CN 2-12 grossly intact, no focal deficits, symmetric strength 5/5 in the upper and lower extremities bilaterally  Psych: Normal Affect    -------------------------------------------------- RESULTS -------------------------------------------------  I have personally reviewed all laboratory and imaging results for this patient. Results are listed below.      LABS:  Results for orders placed or performed during the hospital encounter of 11/27/21   Respiratory Panel, Molecular, with COVID-19 (Restricted: peds pts or suitable admitted adults)    Specimen: Nasopharyngeal   Result Value Ref Range    Adenovirus by PCR Not Detected Not Detected    Bordetella parapertussis by PCR Not Detected Not Detected    Bordetella pertussis by PCR Not Detected Not Detected    Chlamydophilia pneumoniae by PCR Not Detected Not Detected    Coronavirus 229E by PCR Not Detected Not Detected    Coronavirus HKU1 by PCR Not Detected Not Detected    Coronavirus NL63 by PCR Not Detected Not Detected    Coronavirus OC43 by PCR Not Detected Not Detected    SARS-CoV-2, PCR Not Detected Not Detected    Human Metapneumovirus by PCR Not Detected Not Detected    Human Rhinovirus/Enterovirus by PCR Not Detected Not Detected    Influenza A by PCR Not Detected Not Detected    Influenza B by PCR Not Detected Not Detected    Mycoplasma pneumoniae by PCR Not Detected Not Detected    Parainfluenza Virus 1 by PCR Not Detected Not Detected    Parainfluenza Virus 2 by PCR Not Detected Not Detected    Parainfluenza Virus 3 by PCR Not Detected Not Detected    Parainfluenza Virus 4 by PCR Not Detected Not Detected    Respiratory Syncytial Virus by PCR Not Detected Not Detected   CBC auto differential   Result Value Ref Range    WBC 23.4 (H) 4.5 - 11.5 E9/L    RBC 4.84 3.50 - 5.50 E12/L    Hemoglobin 16.2 (H) 11.5 - 15.5 g/dL    Hematocrit 49.2 (H) 34.0 - 48.0 %    .7 (H) 80.0 - 99.9 fL    MCH 33.5 26.0 - 35.0 pg    MCHC 32.9 32.0 - 34.5 %    RDW 12.4 11.5 - 15.0 fL    Platelets 605 471 - 342 E9/L    MPV 9.5 7.0 - 12.0 fL    Neutrophils % 72.0 43.0 - 80.0 %    Lymphocytes % 17.0 (L) 20.0 - 42.0 %    Monocytes % 10.2 2.0 - 12.0 %    Eosinophils % 0.0 0.0 - 6.0 %    Basophils % 0.8 0.0 - 2.0 %    Neutrophils Absolute 16.85 (H) 1.80 - 7.30 E9/L    Lymphocytes Absolute 3.98 1.50 - 4.00 E9/L    Monocytes Absolute 2.34 (H) 0.10 - 0.95 E9/L    Eosinophils Absolute 0.00 (L) 0.05 - 0.50 E9/L    Basophils Absolute 0.19 0.00 - 0.20 E9/L    Anisocytosis 1+     Polychromasia 1+    SPECIMEN REJECTION   Result Value Ref Range    Rejected Test CMP TROP     Reason for Rejection see below    Comprehensive Metabolic Panel w/ Reflex to MG   Result Value Ref Range    Sodium 138 132 - 146 mmol/L    Potassium reflex Magnesium 3.4 (L) 3.5 - 5.0 mmol/L    Chloride 99 98 - 107 mmol/L    CO2 27 22 - 29 mmol/L    Anion Gap 12 7 - 16 mmol/L    Glucose 284 (H) 74 - 99 mg/dL    BUN 11 6 - 20 mg/dL    CREATININE 0.8 0.5 - 1.0 mg/dL    GFR Non-African American >60 >=60 mL/min/1.73    GFR African American >60     Calcium 8.3 (L) 8.6 - 10.2 mg/dL    Total Protein 6.1 (L) 6.4 - 8.3 g/dL    Albumin 3.0 (L) 3.5 - 5.2 g/dL    Total Bilirubin 0.2 0.0 - 1.2 mg/dL    Alkaline Phosphatase 112 (H) 35 - 104 U/L    ALT 52 (H) 0 - 32 U/L     (H) 0 - 31 U/L   LACTIC ACID, PLASMA   Result Value Ref Range    Lactic Acid 1.7 0.5 - 2.2 mmol/L   Troponin   Result Value Ref Range    Troponin, High Sensitivity 56 (H) 0 - 9 ng/L   Blood Gas, Arterial   Result Value Ref Range    Date Analyzed [id number]     Time Analyzed 0047     Source: Blood Arterial     pH, Blood Gas 7.185 (LL) 7.350 - 7.450    PCO2 75.3 (HH) 35.0 - 45.0 mmHg    PO2 167.8 (H) 75.0 - 100.0 mmHg    HCO3 27.8 (H) 22.0 - 26.0 mmol/L    B.E. -2.1 -3.0 - 3.0 mmol/L    O2 Sat 99.1 (H) 92.0 - 98.5 %    PO2/FIO2 3.36 mmHg/%    AaDO2 91.5 mmHg    RI(T) 55 %    O2Hb 97.1 (H) 94.0 - 97.0 %    COHb 1.7 (H) 0.0 - 1.5 %    MetHb 0.3 0.0 - 1.5 %    O2 Content 17.5 mL/dL    HHb 0.9 0.0 - 5.0 %    tHb (est) 12.6 11.5 - 16.5 g/dL    Mode AC     FIO2 50.0 %    Rr Mechanical 26.0 b/min    Vt Mechanical 400.0 mL    Peep/Cpap 5.0 cmH2O    Date Of Collection      Time Collected      Pt Temp 37.0 C     ID [id number]     Lab [id number]     Critical(s) Notified Handed report to Dr/RN    Magnesium   Result Value Ref Range    Magnesium 1.5 (L) 1.6 - 2.6 mg/dL   Procalcitonin   Result Value Ref Range    Procalcitonin 0.07 0.00 - 0.08 ng/mL       RADIOLOGY:  Interpreted by Radiologist.  XR CHEST PORTABLE   Final Result   Interval intubation and placement of nasogastric tube. No other significant   change.   Continued follow-up recommended. XR CHEST PORTABLE   Final Result   Atelectatic changes in the lung bases bilaterally. No other radiographic   evidence of acute cardiopulmonary disease. XR CHEST PORTABLE    (Results Pending)   CTA CHEST W CONTRAST    (Results Pending)   XR ABDOMEN FOR NG/OG/NE TUBE PLACEMENT    (Results Pending)       PROCEDURE  11/27/21       Time: 1144 pm    INTUBATION  Risks, benefits and alternatives if able (for applicable procedures below) described. Performed By: EM Resident. Indication:  impending respiratory failure. Informed consent: Consent unable to be obtained due to the emergent nature of this procedure. .  Procedure: Following Preoxygenation the patient was pretreated with ketamine followed by succinylcholine. Intubation was performed after single attempt(s) by direct laryngoscopy using a Glidescope and 7.5mm cuffed endotracheal tube was inserted . Initial post procedure placement:  confirmed by bilateral breath sounds, ETCO2 detection, and absence of sounds over stomach. Tube Secured @ 23cm at the Lip. Post procedure chest x-ray: has been ordered but is still pending. Procedural Complications: None. Anesthesia Consult:  No.         PROCEDURE  11/28/21       Time: 1225 am    CENTRAL LINE INSERTION  Risks, benefits and alternatives (for applicable procedures below) described. Performed By: EM Resident. Indication: poor peripheral access and centrally administered medications. Informed consent: Consent unable to be obtained due to patient's condition. .  Procedure: After routine sterile preparation, local anesthesia obtained by infiltration using 1% Lidocaine without epinephrine. A right 3-Lumen 7F Central Venous Catheter was placed by femoral vein approach and secured by standard fashion. Ultrasound Guidance:   used. Number of Attempts: 1  Post-procedure Findings: A post procedural chest x-ray  was not indicated. Patient tolerated the procedure well. ------------------------- NURSING NOTES AND VITALS REVIEWED ---------------------------   The nursing notes within the ED encounter and vital signs as below have been reviewed by myself. /80   Pulse 132   Temp 98.3 °F (36.8 °C) (Oral)   Resp 26   Wt 150 lb (68 kg)   LMP 05/07/2021   SpO2 97%   BMI 29.29 kg/m²   Oxygen Saturation Interpretation: Normal    The patients available past medical records and past encounters were reviewed. EKG: This EKG is signed and interpreted by me.     Rate: 121  Rhythm: Sinus tachycardia  Interpretation: non-specific EKG  Comparison: no previous EKG available      ------------------------------ ED COURSE/MEDICAL DECISION MAKING----------------------  Medications   0.9 % sodium chloride infusion ( IntraVENous New Bag 11/28/21 0005)   fentaNYL 5 mcg/ml in 0.9%  ml infusion (32.5 mcg/hr IntraVENous Rate/Dose Change 11/28/21 0135)   norepinephrine (LEVOPHED) 16 mg in dextrose 5% 250 mL infusion (has no administration in time range)   sodium chloride flush 0.9 % injection 5-40 mL (has no administration in time range)   sodium chloride flush 0.9 % injection 5-40 mL (has no administration in time range)   0.9 % sodium chloride infusion (has no administration in time range)   enoxaparin (LOVENOX) injection 40 mg (has no administration in time range)   ondansetron (ZOFRAN-ODT) disintegrating tablet 4 mg (has no administration in time range)     Or   ondansetron (ZOFRAN) injection 4 mg (has no administration in time range)   polyethylene glycol (GLYCOLAX) packet 17 g (has no administration in time range)   acetaminophen (TYLENOL) tablet 650 mg (has no administration in time range)     Or   acetaminophen (TYLENOL) suppository 650 mg (has no administration in time range)   methylPREDNISolone sodium (SOLU-MEDROL) injection 40 mg (has no administration in time range)   ipratropium-albuterol (DUONEB) nebulizer solution 1 ampule (has no administration in time range) budesonide (PULMICORT) nebulizer suspension 500 mcg (has no administration in time range)   Arformoterol Tartrate (BROVANA) nebulizer solution 15 mcg (has no administration in time range)   pantoprazole (PROTONIX) injection 40 mg (has no administration in time range)   potassium chloride 20 mEq/50 mL IVPB (Central Line) (has no administration in time range)   cefTRIAXone (ROCEPHIN) 1,000 mg in sterile water 10 mL IV syringe (has no administration in time range)   doxycycline (VIBRAMYCIN) 100 mg in dextrose 5 % 100 mL IVPB (has no administration in time range)   magnesium sulfate 1000 mg in dextrose 5% 100 mL IVPB (has no administration in time range)   fentaNYL (SUBLIMAZE) injection 50 mcg (50 mcg IntraVENous Bolus from Bag 11/28/21 0140)   ipratropium-albuterol (DUONEB) nebulizer solution 1 ampule (1 ampule Inhalation Given 11/27/21 2238)   LORazepam (ATIVAN) 2 MG/ML injection (0.5 mg IntraVENous Given 11/27/21 2319)   ketamine (KETALAR) 100 MG/ML injection (  Given 11/28/21 0122)   succinylcholine (ANECTINE) 20 MG/ML injection (200 mg  Given 11/28/21 0006)   propofol injection (30 mcg/kg/min × 68 kg IntraVENous Rate/Dose Change 11/28/21 0134)   midazolam (VERSED) 2 MG/2ML injection (4 mg  Given 11/28/21 0006)   midazolam PF (VERSED) injection 2 mg (2 mg IntraVENous Given 11/28/21 0146)   fentaNYL (SUBLIMAZE) injection 100 mcg (100 mcg IntraVENous Given 11/28/21 0024)             Medical Decision Making:    Patient presenting here because of shortness of breath. Patient really been short of breath for the past week she was recently on a Z-Keegan. Patient reports symptoms are worsening. Patient was reportedly hypoxic at home. Patient initially was awake alert here. Patient was breathing without difficulty she was initially on nonrebreather patient was switched to cannula. Patient while here in the emergency department attempted to get breathing treatments. Patient could not tolerate.   Patient reporting feeling very anxious and wanted something to relax her patient was ordered 0.5 Ativan she was made aware that benzodiazepines would cause worsening symptoms but patient was adamant that she needs something to relax. Patient lungs were diminished. Patient medicated and initially was to place patient on BiPAP. Patient becoming more dyspneic here diaphoretic somewhat mottled here in the emergency department. Patient was intubated under my supervision by the resident. Patient was placed on sedation. Patient ABG noted reviewed. Patient will be admitted to the ICU  CT chest was ordered by attending for internal medicine. Re-Evaluations:             Re-evaluation. Patients symptoms show no change  Patient while here in the emergency department symptoms were worsening patient dysuria here in the emergency room. Patient became progressively dyspneic and was in respiratory distress. Patient was mottled. Patient was intubated under my supervision. Patient placed on sedation. White count noted to be elevated. Patient does have a history of COPD there is no family here to discuss case with. Consultations:             Internal medicine call was placed to ICU attending. Critical Care:     Please note that the withdrawal or failure to initiate urgent interventions for this patient would likely result in a life threatening deterioration or permanent disability. Accordingly this patient received 35 minutes of critical care time, excluding separately billable procedures. This patient's ED course included: a personal history and physicial eaxmination    This patient has been closely monitored during their ED course. Counseling: The emergency provider has spoken with the patient and discussed todays results, in addition to providing specific details for the plan of care and counseling regarding the diagnosis and prognosis. Questions are answered at this time and they are agreeable with the plan. --------------------------------- IMPRESSION AND DISPOSITION ---------------------------------    IMPRESSION  1. Acute respiratory failure, unspecified whether with hypoxia or hypercapnia ([facility] [address]        DISPOSITION  Disposition: Admit to the ICU  Patient condition is fair        NOTE: This report was transcribed using voice recognition software.  Every effort was made to ensure accuracy; however, inadvertent computerized transcription errors may be present          Izzy Jewell MD  11/27/21 5522       Izzy Jewell MD  11/28/21 3293

## 2021-11-28 NOTE — CONSULTS
Inpatient Cardiology Consultation      Reason for Consult: Elevated Troponin     Consulting Physician: Dr. Devan Alvarez    Requesting Physician:  Dr. Cecil Whitt     Date of Consultation: 11/28/2021      HISTORY OF PRESENT ILLNESS:     Please note Consult obtained through EMR as patient is currently intubated and sedated. Ms. Neil Hurtado is a 45year old female who is known to [facility] and previously followed with Dr. Swathi Barry (2013). She has a PMHx of COPD, asthma, GERD, IBS, chronic back pain, anxiety. She presented to [facility] ED on 11/27/2021 with complaints of shortness of breath that had been progressing for 1 week. She was treated as an outpatient with steroids and Zithromax however shortness of breath progressed. She does have supplemental O2 at home and was requiring increased 02 demands for increasing hypoxia at home. She denies fever, chills or cough. She was vaccinated for COVID-19. Upon arrival to the ED she was alert with notable shortness of breath with increasing difficult despite attempts with non rebreather and BiPaP resulting in intubation. Lab findings demonstrated elevated troponin 56 > 129 prompting cardiology consultation. Prior to intubation no documentation that that patient had reports of chest pain. Please note: past medical records were reviewed per electronic medical record (EMR) - see detailed reports under Past Medical/ Surgical History.    Past Medical History:    Past Medical History:   Diagnosis Date    Anxiety     Arthritis     Asthma     Chronic back pain     Chronic lower back pain     Chronic neck pain     COPD     COPD exacerbation ([facility] [address] 12/26/2019    Difficult intravenous access     and venipuncture    Fibrocystic breast changes     GERD (gastroesophageal reflux disease)     Hiatal hernia    Herpes zoster ophthalmicus     Irritable bowel syndrome     Obesity     On supplemental oxygen therapy     1l/min/nc nightly and daily prn    Psoriasis     Right knee pain 07/2021    for Pedro Luis Wilsonenta 07/2021    RSV bronchitis 12/30/2019    Tinea versicolor     Wears contact lenses     Wears dentures     upper    Wears glasses        Past Surgical History:    Past Surgical History:   Procedure Laterality Date    CHOLECYSTECTOMY  11/2003    HERNIA REPAIR  24/5238    umbilical    KNEE ARTHROSCOPY Right     LAPAROSCOPY      LEEP      LYMPH NODE BIOPSY      TOTAL KNEE ARTHROPLASTY Right 7/12/2021    RIGHT KNEE JOHNNIE ROBOTIC ASSISTED TOTAL ARTHROPLASTY  PNB performed by Sidney Richards MD at [facility] OR       Medications Prior to admit:  Prior to Admission medications    Medication Sig Start Date End Date Taking? Authorizing Provider   loratadine (CLARITIN) 10 MG tablet Take 10 mg by mouth daily    Historical Provider, MD   metoprolol tartrate (LOPRESSOR) 25 MG tablet Take 12.5 mg by mouth 2 times daily    Historical Provider, MD   guaiFENesin (MUCINEX MAXIMUM STRENGTH) 1200 MG TB12 Take 1 tablet by mouth 2 times daily     Historical Provider, MD   albuterol (PROVENTIL) (2.5 MG/3ML) 0.083% nebulizer solution Take 2.5 mg by nebulization every 4-6 hours as needed Pt states uses every morning and then prn 2/2/21   Historical Provider, MD   OXYGEN 1/l/min/nc at night and daily prn    Historical Provider, MD   predniSONE (DELTASONE) 10 MG tablet Take 10 mg by mouth daily    Historical Provider, MD   Fluticasone-Umeclidin-Vilant (TRELEGY ELLIPTA IN) Inhale 1 puff into the lungs daily    Historical Provider, MD   omeprazole (PRILOSEC) 20 MG delayed release capsule Take 40 mg by mouth daily    Historical Provider, MD   albuterol (PROVENTIL HFA;VENTOLIN HFA) 108 (90 BASE) MCG/ACT inhaler Inhale 2 puffs into the lungs every 6 hours as needed.     Historical Provider, MD       Current Medications:    Current Facility-Administered Medications: fentaNYL 5 mcg/ml in 0.9%  ml infusion, 12.5-200 mcg/hr, IntraVENous, Continuous  norepinephrine (LEVOPHED) 16 mg in dextrose 5% 250 mL infusion, Number of children: Not on file    Years of education: Not on file    Highest education level: Not on file   Occupational History    Not on file   Tobacco Use    Smoking status: Current Every Day Smoker     Packs/day: 0.50     Years: 40.00     Pack years: 20.00     Types: Cigarettes    Smokeless tobacco: Never Used    Tobacco comment: down from 2 packs   Vaping Use    Vaping Use: Never used   Substance and Sexual Activity    Alcohol use: Yes     Comment: socially    Drug use: No    Sexual activity: Yes   Other Topics Concern    Not on file   Social History Narrative    Not on file     Social Determinants of Health     Financial Resource Strain: Low Risk     Difficulty of Paying Living Expenses: Not hard at all   Food Insecurity: No Food Insecurity    Worried About Running Out of Food in the Last Year: Never true    Ed of Food in the Last Year: Never true   Transportation Needs:     Lack of Transportation (Medical): Not on file    Lack of Transportation (Non-Medical):  Not on file   Physical Activity:     Days of Exercise per Week: Not on file    Minutes of Exercise per Session: Not on file   Stress:     Feeling of Stress : Not on file   Social Connections:     Frequency of Communication with Friends and Family: Not on file    Frequency of Social Gatherings with Friends and Family: Not on file    Attends Restorationist Services: Not on file    Active Member of [address] [place] [address] or Organizations: Not on file    Attends Club or Organization Meetings: Not on file    Marital Status: Not on file   Intimate Partner Violence:     Fear of Current or Ex-Partner: Not on file    Emotionally Abused: Not on file    Physically Abused: Not on file    Sexually Abused: Not on file   Housing Stability:     Unable to Pay for Housing in the Last Year: Not on file    Number of [place] in the Last Year: Not on file    Unstable Housing in the Last Year: Not on file       Family History: Per EMR given intubation status   Family History   Problem Relation Age of Onset    Cancer Mother 40        Non-Hodgkin Lymphoma    Diabetes Sister     High Blood Pressure Sister     Arthritis Sister         Gout    Arthritis Father     Psoriasis Father     Diabetes Father     High Blood Pressure Father     Kidney Disease Father     Heart Disease Father        REVIEW OF SYSTEMS:     · JI given intubation status       PHYSICAL EXAM:   BP (!) 144/87   Pulse 110   Temp 98.3 °F (36.8 °C) (Oral)   Resp 24   Wt 150 lb (68 kg)   LMP 05/07/2021   SpO2 99%   BMI 29.29 kg/m²   CONST:  Well developed,  female. Intubated/sedated  HEENT:   Head- Normocephalic, atraumatic   Eyes- Conjunctivae pink, anicteric  Throat- Oral airway in place  Neck-  No stridor, trachea midline, no jugular venous distention. No carotid bruit. CHEST: Chest symmetrical and non-tender to palpation. No accessory muscle use or intercostal retractions  RESPIRATORY: Lung sounds - clear throughout fields   CARDIOVASCULAR:     Heart Inspection- shows no noted pulsations  Heart Palpation- no heaves or thrills; PMI is non-displaced   Heart Ausculation- Regular rate and rhythm, no murmur. No s3, s4 or rub   PV: No lower extremity edema. No varicosities. Pedal pulses palpable, no clubbing or cyanosis   ABDOMEN: Soft, non-tender to light palpation. Bowel sounds present. No palpable masses no organomegaly; no abdominal bruit  MS: Good muscle strength and tone. No atrophy or abnormal movements.    : Deferred  SKIN: Warm and dry no statis dermatitis or ulcers   NEURO / PSYCH: Intubated / sedated    DATA:    ECG / Tele strips: please see HPI   Diagnostic:    No intake or output data in the 24 hours ending 11/28/21 0754    Labs:   CBC:   Recent Labs     11/27/21  2245   WBC 23.4*   HGB 16.2*   HCT 49.2*        BMP:   Recent Labs     11/28/21  0039      K 3.4*   CO2 27   BUN 11   CREATININE 0.8   LABGLOM >60   CALCIUM 8.3*     Mag:   Recent Labs 11/28/21  0039   MG 1.5*     TFT:   Lab Results   Component Value Date    TSH 1.008 02/08/2011      HgA1c:   Lab Results   Component Value Date    LABA1C 5.4 04/23/2011     proBNP:   Recent Labs     11/28/21  0631   PROBNP 714*     CARDIAC ENZYMES:  Recent Labs     11/28/21  0039 11/28/21  0259   TROPHS 56* 129*     FASTING LIPID PANEL:  Lab Results   Component Value Date    CHOL 200 02/08/2011    HDL 55.0 02/08/2011    LDLCALC 133 02/08/2011    TRIG 61 02/08/2011     LIVER PROFILE:  Recent Labs     11/28/21 0039   *   ALT 52*   LABALBU 3.0*     IMAGING:    CXR (11/27/2021)  Impression  Atelectatic changes in the lung bases bilaterally.  No other radiographic evidence of acute cardiopulmonary disease. CTA Chest (11/28/2021)  Impression:  Exam somewhat limited by patient motion.  Allowing for this, no identified pulmonary embolism. Minimal opacities in the lungs favored to represent developing infectious or inflammatory process.  Mild edema less likely.  Short-term follow-up if symptoms persist.      CARDIAC TESTING:    TTE (4/2013)      LHC (4/2013)  HEMODYNAMICS:   1. Opening aortic pressure: 151/88 with a mean of 115.   2.    Left ventricular systolic pressure:  990.   3.    LVEDP:  16.   4.    No significant gradient across the aortic valve. ANGIOGRAPHIC RESULTS:   1. Left main:  No angiographically significant stenosis. 2.    LAD:  Ostial to proximal diffuse 20% stenosis. 3.    D-1:  No angiographically significant stenosis. 4.    D-2:  No angiographically significant stenosis. 5.    Circumflex:  No angiographically significant stenosis. 6.    OM-1:  No angiographically significant stenosis. 7.     Right coronary artery:  Dominant vessel. No angiographically significant stenosis. LEFT VENTRICULOGRAM:  Normal LV size and systolic function. No wall motion abnormalities. Ejection fraction 55%. No significant mitral regurgitation. ASSESSMENT:  1.  Elevated troponin reflective of acute myocardial injury in the setting of COPD exacerbation. No clinical evidence of ischemia by symptoms or EKG. 2. Acute on chronic respiratory failure requiring intubation due to COPD exacerbation. No evidence of heart failure  3. COPD with ongoing tobacco abuse   4. Leukocytosis   5. Hypokalemia  6. Hypomagnesia       PLAN:  1. Low dose ASA  2. Beta blocker if BP tolerates   3. No statin given low LDL  4. Supplement electrolytes   5. No further work up recommended from a cardiology standpoint at this time  6. Once patient is extubated if she complains of anginal symptoms please call   7. Cardiology will sign off      Above discussed with Dr. May Holbrook     Electronically signed by YANI Martinez CNP on 11/28/2021 at 7:54 AM     PHYSICIAN ADDENDUM:  I independently reviewed the HPI, ROS, PMH, PSH, [address], SH, and medications independently and agree with above documentation.  Plan discussed with the patient/family/care team.    Critical care time 45 minutes spent reviewing data, documentation, exam, formulating a therapeutic plan and discussing with family/care team.     Sada Alford MD, [facility] [place] [facility]  Interventional Cardiology/Structural Heart Disease  Office: [phone]  Coordinator: Ike Moore

## 2021-11-28 NOTE — ED NOTES
Assumed patient care at [place] [address]. Patient short of breath on 7L NC. Patient confused, diaphoretic and clammy. Patient medicated as ordered. Unable to maintain a stable airway and patient intubated by provider. OG tube placed. Xray confirmed. 16F wilks inserted. Patient is a full code. Patient bucking the tube and breathing over the vent. Patient medicated as ordered. See vital and MAR.  Will continue to monitor patient      Nicole Greenberg RN  11/28/21 6992

## 2021-11-29 LAB
ALBUMIN SERPL-MCNC: 3.2 G/DL (ref 3.5–5.2)
ALP BLD-CCNC: 99 U/L (ref 35–104)
ALT SERPL-CCNC: 50 U/L (ref 0–32)
ANION GAP SERPL CALCULATED.3IONS-SCNC: 14 MMOL/L (ref 7–16)
AST SERPL-CCNC: 27 U/L (ref 0–31)
BILIRUB SERPL-MCNC: <0.2 MG/DL (ref 0–1.2)
BUN BLDV-MCNC: 13 MG/DL (ref 6–20)
CALCIUM SERPL-MCNC: 9.1 MG/DL (ref 8.6–10.2)
CHLORIDE BLD-SCNC: 100 MMOL/L (ref 98–107)
CO2: 28 MMOL/L (ref 22–29)
CREAT SERPL-MCNC: 0.6 MG/DL (ref 0.5–1)
EKG ATRIAL RATE: 121 BPM
EKG P AXIS: 80 DEGREES
EKG P-R INTERVAL: 112 MS
EKG Q-T INTERVAL: 344 MS
EKG QRS DURATION: 80 MS
EKG QTC CALCULATION (BAZETT): 488 MS
EKG R AXIS: 26 DEGREES
EKG T AXIS: 85 DEGREES
EKG VENTRICULAR RATE: 121 BPM
GFR AFRICAN AMERICAN: >60
GFR NON-AFRICAN AMERICAN: >60 ML/MIN/1.73
GLUCOSE BLD-MCNC: 209 MG/DL (ref 74–99)
HCT VFR BLD CALC: 34.1 % (ref 34–48)
HEMOGLOBIN: 11.2 G/DL (ref 11.5–15.5)
LACTIC ACID: 1.7 MMOL/L (ref 0.5–2.2)
LACTIC ACID: 1.7 MMOL/L (ref 0.5–2.2)
LACTIC ACID: 2.1 MMOL/L (ref 0.5–2.2)
MCH RBC QN AUTO: 34.1 PG (ref 26–35)
MCHC RBC AUTO-ENTMCNC: 32.8 % (ref 32–34.5)
MCV RBC AUTO: 104 FL (ref 80–99.9)
PDW BLD-RTO: 12.8 FL (ref 11.5–15)
PLATELET # BLD: 265 E9/L (ref 130–450)
PMV BLD AUTO: 9.7 FL (ref 7–12)
POTASSIUM SERPL-SCNC: 3.5 MMOL/L (ref 3.5–5)
RBC # BLD: 3.28 E12/L (ref 3.5–5.5)
SODIUM BLD-SCNC: 142 MMOL/L (ref 132–146)
TOTAL PROTEIN: 6.4 G/DL (ref 6.4–8.3)
WBC # BLD: 16.2 E9/L (ref 4.5–11.5)

## 2021-11-29 PROCEDURE — 6360000002 HC RX W HCPCS: Performed by: INTERNAL MEDICINE

## 2021-11-29 PROCEDURE — 2580000003 HC RX 258: Performed by: INTERNAL MEDICINE

## 2021-11-29 PROCEDURE — 94003 VENT MGMT INPAT SUBQ DAY: CPT

## 2021-11-29 PROCEDURE — 93010 ELECTROCARDIOGRAM REPORT: CPT | Performed by: INTERNAL MEDICINE

## 2021-11-29 PROCEDURE — 2500000003 HC RX 250 WO HCPCS: Performed by: EMERGENCY MEDICINE

## 2021-11-29 PROCEDURE — 6360000002 HC RX W HCPCS: Performed by: STUDENT IN AN ORGANIZED HEALTH CARE EDUCATION/TRAINING PROGRAM

## 2021-11-29 PROCEDURE — 2000000000 HC ICU R&B

## 2021-11-29 PROCEDURE — 6360000002 HC RX W HCPCS: Performed by: EMERGENCY MEDICINE

## 2021-11-29 PROCEDURE — 83605 ASSAY OF LACTIC ACID: CPT

## 2021-11-29 PROCEDURE — 6370000000 HC RX 637 (ALT 250 FOR IP): Performed by: INTERNAL MEDICINE

## 2021-11-29 PROCEDURE — 94640 AIRWAY INHALATION TREATMENT: CPT

## 2021-11-29 PROCEDURE — 85027 COMPLETE CBC AUTOMATED: CPT

## 2021-11-29 PROCEDURE — C9113 INJ PANTOPRAZOLE SODIUM, VIA: HCPCS | Performed by: INTERNAL MEDICINE

## 2021-11-29 PROCEDURE — 80053 COMPREHEN METABOLIC PANEL: CPT

## 2021-11-29 PROCEDURE — 2500000003 HC RX 250 WO HCPCS: Performed by: INTERNAL MEDICINE

## 2021-11-29 RX ORDER — MIDAZOLAM HYDROCHLORIDE 2 MG/2ML
4 INJECTION, SOLUTION INTRAMUSCULAR; INTRAVENOUS ONCE
Status: COMPLETED | OUTPATIENT
Start: 2021-11-29 | End: 2021-11-29

## 2021-11-29 RX ADMIN — IPRATROPIUM BROMIDE AND ALBUTEROL SULFATE 1 AMPULE: .5; 2.5 SOLUTION RESPIRATORY (INHALATION) at 00:00

## 2021-11-29 RX ADMIN — Medication 10 ML: at 09:28

## 2021-11-29 RX ADMIN — METHYLPREDNISOLONE SODIUM SUCCINATE 40 MG: 40 INJECTION, POWDER, FOR SOLUTION INTRAMUSCULAR; INTRAVENOUS at 12:10

## 2021-11-29 RX ADMIN — MIDAZOLAM 4 MG: 1 INJECTION INTRAMUSCULAR; INTRAVENOUS at 22:43

## 2021-11-29 RX ADMIN — IPRATROPIUM BROMIDE AND ALBUTEROL SULFATE 1 AMPULE: .5; 2.5 SOLUTION RESPIRATORY (INHALATION) at 15:41

## 2021-11-29 RX ADMIN — DOXYCYCLINE 100 MG: 100 INJECTION, POWDER, LYOPHILIZED, FOR SOLUTION INTRAVENOUS at 12:11

## 2021-11-29 RX ADMIN — ENOXAPARIN SODIUM 40 MG: 100 INJECTION SUBCUTANEOUS at 09:27

## 2021-11-29 RX ADMIN — PROPOFOL 15 ML/HR: 10 INJECTION, EMULSION INTRAVENOUS at 15:50

## 2021-11-29 RX ADMIN — METHYLPREDNISOLONE SODIUM SUCCINATE 40 MG: 40 INJECTION, POWDER, FOR SOLUTION INTRAMUSCULAR; INTRAVENOUS at 01:46

## 2021-11-29 RX ADMIN — ARFORMOTEROL TARTRATE 15 MCG: 15 SOLUTION RESPIRATORY (INHALATION) at 06:15

## 2021-11-29 RX ADMIN — Medication 125 MCG/HR: at 22:43

## 2021-11-29 RX ADMIN — BUDESONIDE 500 MCG: 0.5 INHALANT RESPIRATORY (INHALATION) at 19:39

## 2021-11-29 RX ADMIN — WATER 1000 MG: 1 INJECTION INTRAMUSCULAR; INTRAVENOUS; SUBCUTANEOUS at 01:46

## 2021-11-29 RX ADMIN — IPRATROPIUM BROMIDE AND ALBUTEROL SULFATE 1 AMPULE: .5; 2.5 SOLUTION RESPIRATORY (INHALATION) at 11:36

## 2021-11-29 RX ADMIN — IPRATROPIUM BROMIDE AND ALBUTEROL SULFATE 1 AMPULE: .5; 2.5 SOLUTION RESPIRATORY (INHALATION) at 04:05

## 2021-11-29 RX ADMIN — IPRATROPIUM BROMIDE AND ALBUTEROL SULFATE 1 AMPULE: .5; 2.5 SOLUTION RESPIRATORY (INHALATION) at 06:15

## 2021-11-29 RX ADMIN — Medication 75 MCG/HR: at 05:52

## 2021-11-29 RX ADMIN — BUDESONIDE 500 MCG: 0.5 INHALANT RESPIRATORY (INHALATION) at 06:15

## 2021-11-29 RX ADMIN — IPRATROPIUM BROMIDE AND ALBUTEROL SULFATE 1 AMPULE: .5; 2.5 SOLUTION RESPIRATORY (INHALATION) at 19:39

## 2021-11-29 RX ADMIN — PROPOFOL 50 MCG/KG/MIN: 10 INJECTION, EMULSION INTRAVENOUS at 04:45

## 2021-11-29 RX ADMIN — ARFORMOTEROL TARTRATE 15 MCG: 15 SOLUTION RESPIRATORY (INHALATION) at 19:39

## 2021-11-29 RX ADMIN — PROPOFOL 50 MCG/KG/MIN: 10 INJECTION, EMULSION INTRAVENOUS at 09:28

## 2021-11-29 RX ADMIN — PANTOPRAZOLE SODIUM 40 MG: 40 INJECTION, POWDER, FOR SOLUTION INTRAVENOUS at 09:28

## 2021-11-29 RX ADMIN — DOXYCYCLINE 100 MG: 100 INJECTION, POWDER, LYOPHILIZED, FOR SOLUTION INTRAVENOUS at 01:46

## 2021-11-29 ASSESSMENT — PULMONARY FUNCTION TESTS
PIF_VALUE: 29
PIF_VALUE: 30
PIF_VALUE: 32
PIF_VALUE: 30
PIF_VALUE: 35
PIF_VALUE: 28
PIF_VALUE: 36
PIF_VALUE: 34

## 2021-11-29 NOTE — PROGRESS NOTES
Hospitalist Progress Note        SUBJECTIVE:    Patient seen and examined in the ER this morning. Currently intubated and sedated. Records reviewed. Stable overnight. No other overnight issues reported. No data recorded. DIET: Diet NPO  CODE: Full Code  No intake or output data in the 24 hours ending 11/29/21 1403    OBJECTIVE:    /71   Pulse 88   Temp 98.3 °F (36.8 °C) (Oral)   Resp 21   Wt 150 lb (68 kg)   LMP 05/07/2021   SpO2 98%   BMI 29.29 kg/m²     General appearance: No apparent distress sedated . respiratory: Intubated, on mechanical ventilation. Coarse breath sounds bilaterally   Cardiovascular: Regular rate rhythm, normal S1-S2  Abdomen: Soft, nontender, nondistended  Musculoskeletal: No bilateral lower extremity edema. Neurologic: Sedated    ASSESSMENT AND PLAN:    Patient, 60-year-old female with past medical history of COPD with chronic hypoxic respiratory failure on supplemental oxygen via nasal cannula between 1 to 2 L/min, GERD, was admitted on 11/27/2021 for evaluation of acute onset of shortness of breath. Patient was intubated in the emergency room and required pressor support with Levophed. Levophed was successfully weaned off. Acute on chronic hypoxic/hypercapnic respiratory failure secondary to acute COPD exacerbation  -Currently intubated and sedated. FiO2 40%, PEEP of 5  -Critical care consult has been requested, awaiting recommendation   -Will consult pulmonary  -Continue Solu-Medrol/DuoNeb    Bilateral lung opacities  -Likely developing pneumonia as per CTA chest  -No evidence of acute PE  -Continue ceftriaxone/doxycycline  -Check urine Legionella/streptococcal antigen      Septic shock  -Likely secondary to above  -Currently off of pressor support  -Continue antibiotics as above    Elevated troponin  -Cardiology evaluation appreciated. Elevated troponin reflective of acute myocardial injury in the setting of COPD exacerbation.   No clinical evidence of ischemia per EKG. -As per cardiology, continue aspirin/beta-blocker. No statin therapy given low LDL. No further work-up recommended from cardiology standpoint.     DVT prophylaxis  -Lovenox subcu      DISPOSITION: Pending clinical improvement    Medications:  REVIEWED DAILY    Infusion Medications    norepinephrine      sodium chloride      propofol 50 mcg/kg/min (11/29/21 0928)    fentaNYL 5 mcg/ml in 0.9%  ml infusion 75 mcg/hr (11/29/21 0552)    sodium chloride 100 mL/hr at 11/28/21 0005     Scheduled Medications    sodium chloride flush  5-40 mL IntraVENous 2 times per day    enoxaparin  40 mg SubCUTAneous Daily    methylPREDNISolone  40 mg IntraVENous Q12H    ipratropium-albuterol  1 ampule Inhalation Q4H While awake    budesonide  0.5 mg Nebulization BID    Arformoterol Tartrate  15 mcg Nebulization BID    pantoprazole  40 mg IntraVENous Daily    cefTRIAXone (ROCEPHIN) IV  1,000 mg IntraVENous Q24H    doxycycline (VIBRAMYCIN) IV  100 mg IntraVENous Q12H    fentanNYL  50 mcg IntraVENous Once    aspirin  81 mg Oral Daily     PRN Meds: sodium chloride flush, sodium chloride, ondansetron **OR** ondansetron, polyethylene glycol, acetaminophen **OR** acetaminophen, potassium chloride, magnesium sulfate, sodium chloride flush, perflutren lipid microspheres    Labs:     Recent Labs     11/27/21 2245 11/28/21  0806 11/29/21  0555   WBC 23.4* 16.3* 16.2*   HGB 16.2* 11.6 11.2*   HCT 49.2* 35.4 34.1    254 265       Recent Labs     11/28/21  0039 11/28/21  0806 11/29/21  0555    141  141 142   K 3.4* 3.8  3.8 3.5   CL 99 101  101 100   CO2 27 27  26 28   BUN 11 10  9 13   CREATININE 0.8 0.7  0.7 0.6   CALCIUM 8.3* 8.7  8.4* 9.1   PHOS  --  3.0  --        Recent Labs     11/28/21  0039 11/28/21  0806 11/29/21  0555   PROT 6.1* 6.1*  6.0* 6.4   ALKPHOS 112* 125*  121* 99   ALT 52* 75*  76* 50*   * 103*  104* 27   BILITOT 0.2 0.2  <0.2 <0.2       No results for

## 2021-11-29 NOTE — ED NOTES
Patient wanting to know when she can be taken off of ventilator.       Benny Paulson RN  11/29/21 5687

## 2021-11-30 ENCOUNTER — APPOINTMENT (OUTPATIENT)
Dept: GENERAL RADIOLOGY | Age: 51
DRG: 871 | End: 2021-11-30
Payer: COMMERCIAL

## 2021-11-30 LAB
AADO2: 159.7 MMHG
ALBUMIN SERPL-MCNC: 3.1 G/DL (ref 3.5–5.2)
ALP BLD-CCNC: 81 U/L (ref 35–104)
ALT SERPL-CCNC: 35 U/L (ref 0–32)
ANION GAP SERPL CALCULATED.3IONS-SCNC: 10 MMOL/L (ref 7–16)
ANION GAP SERPL CALCULATED.3IONS-SCNC: 11 MMOL/L (ref 7–16)
AST SERPL-CCNC: 14 U/L (ref 0–31)
B.E.: 5.5 MMOL/L (ref -3–3)
BASOPHILS ABSOLUTE: 0.01 E9/L (ref 0–0.2)
BASOPHILS RELATIVE PERCENT: 0.1 % (ref 0–2)
BILIRUB SERPL-MCNC: <0.2 MG/DL (ref 0–1.2)
BUN BLDV-MCNC: 16 MG/DL (ref 6–20)
BUN BLDV-MCNC: 16 MG/DL (ref 6–20)
CALCIUM SERPL-MCNC: 8.9 MG/DL (ref 8.6–10.2)
CALCIUM SERPL-MCNC: 8.9 MG/DL (ref 8.6–10.2)
CHLORIDE BLD-SCNC: 99 MMOL/L (ref 98–107)
CHLORIDE BLD-SCNC: 99 MMOL/L (ref 98–107)
CO2: 29 MMOL/L (ref 22–29)
CO2: 29 MMOL/L (ref 22–29)
COHB: 0.9 % (ref 0–1.5)
CREAT SERPL-MCNC: 0.5 MG/DL (ref 0.5–1)
CREAT SERPL-MCNC: 0.5 MG/DL (ref 0.5–1)
CRITICAL: ABNORMAL
DATE ANALYZED: ABNORMAL
DATE OF COLLECTION: ABNORMAL
EOSINOPHILS ABSOLUTE: 0.01 E9/L (ref 0.05–0.5)
EOSINOPHILS RELATIVE PERCENT: 0.1 % (ref 0–6)
FIO2: 40 %
FOLATE: 3.3 NG/ML (ref 4.8–24.2)
GFR AFRICAN AMERICAN: >60
GFR AFRICAN AMERICAN: >60
GFR NON-AFRICAN AMERICAN: >60 ML/MIN/1.73
GFR NON-AFRICAN AMERICAN: >60 ML/MIN/1.73
GLUCOSE BLD-MCNC: 174 MG/DL (ref 74–99)
GLUCOSE BLD-MCNC: 176 MG/DL (ref 74–99)
HAV IGM SER IA-ACNC: NORMAL
HCO3: 30.5 MMOL/L (ref 22–26)
HCT VFR BLD CALC: 31 % (ref 34–48)
HEMOGLOBIN: 10.3 G/DL (ref 11.5–15.5)
HEPATITIS B CORE IGM ANTIBODY: NORMAL
HEPATITIS B SURFACE ANTIGEN INTERPRETATION: NORMAL
HEPATITIS C ANTIBODY INTERPRETATION: NORMAL
HHB: 10.2 % (ref 0–5)
IMMATURE GRANULOCYTES #: 0.12 E9/L
IMMATURE GRANULOCYTES %: 1 % (ref 0–5)
LAB: ABNORMAL
LACTIC ACID: 1 MMOL/L (ref 0.5–2.2)
LACTIC ACID: 1.5 MMOL/L (ref 0.5–2.2)
LACTIC ACID: 1.5 MMOL/L (ref 0.5–2.2)
LACTIC ACID: 1.7 MMOL/L (ref 0.5–2.2)
LYMPHOCYTES ABSOLUTE: 0.81 E9/L (ref 1.5–4)
LYMPHOCYTES RELATIVE PERCENT: 6.9 % (ref 20–42)
Lab: ABNORMAL
MAGNESIUM: 2 MG/DL (ref 1.6–2.6)
MCH RBC QN AUTO: 34.6 PG (ref 26–35)
MCHC RBC AUTO-ENTMCNC: 33.2 % (ref 32–34.5)
MCV RBC AUTO: 104 FL (ref 80–99.9)
METER GLUCOSE: 123 MG/DL (ref 74–99)
METER GLUCOSE: 145 MG/DL (ref 74–99)
METER GLUCOSE: 148 MG/DL (ref 74–99)
METER GLUCOSE: 199 MG/DL (ref 74–99)
METHB: 0.3 % (ref 0–1.5)
MODE: AC
MONOCYTES ABSOLUTE: 0.42 E9/L (ref 0.1–0.95)
MONOCYTES RELATIVE PERCENT: 3.6 % (ref 2–12)
NEUTROPHILS ABSOLUTE: 10.34 E9/L (ref 1.8–7.3)
NEUTROPHILS RELATIVE PERCENT: 88.3 % (ref 43–80)
O2 CONTENT: 14.1 ML/DL
O2 SATURATION: 89.7 % (ref 92–98.5)
O2HB: 88.6 % (ref 94–97)
OPERATOR ID: 2962
PATIENT TEMP: 37 C
PCO2: 46.7 MMHG (ref 35–45)
PDW BLD-RTO: 13.1 FL (ref 11.5–15)
PEEP/CPAP: 5 CMH2O
PFO2: 1.54 MMHG/%
PH BLOOD GAS: 7.43 (ref 7.35–7.45)
PHOSPHORUS: 4.1 MG/DL (ref 2.5–4.5)
PLATELET # BLD: 281 E9/L (ref 130–450)
PMV BLD AUTO: 9.4 FL (ref 7–12)
PO2: 61.8 MMHG (ref 75–100)
POTASSIUM SERPL-SCNC: 3.6 MMOL/L (ref 3.5–5)
POTASSIUM SERPL-SCNC: 3.6 MMOL/L (ref 3.5–5)
PROCALCITONIN: 0.35 NG/ML (ref 0–0.08)
RBC # BLD: 2.98 E12/L (ref 3.5–5.5)
RI(T): 2.58
RR MECHANICAL: 20 B/MIN
SODIUM BLD-SCNC: 138 MMOL/L (ref 132–146)
SODIUM BLD-SCNC: 139 MMOL/L (ref 132–146)
SOURCE, BLOOD GAS: ABNORMAL
THB: 11.3 G/DL (ref 11.5–16.5)
TIME ANALYZED: 533
TOTAL PROTEIN: 5.9 G/DL (ref 6.4–8.3)
TRIGL SERPL-MCNC: 164 MG/DL (ref 0–149)
VITAMIN B-12: 1697 PG/ML (ref 211–946)
VT MECHANICAL: 380 ML
WBC # BLD: 11.7 E9/L (ref 4.5–11.5)

## 2021-11-30 PROCEDURE — 2000000000 HC ICU R&B

## 2021-11-30 PROCEDURE — 6360000002 HC RX W HCPCS: Performed by: INTERNAL MEDICINE

## 2021-11-30 PROCEDURE — 84478 ASSAY OF TRIGLYCERIDES: CPT

## 2021-11-30 PROCEDURE — 6360000002 HC RX W HCPCS: Performed by: STUDENT IN AN ORGANIZED HEALTH CARE EDUCATION/TRAINING PROGRAM

## 2021-11-30 PROCEDURE — 2500000003 HC RX 250 WO HCPCS: Performed by: EMERGENCY MEDICINE

## 2021-11-30 PROCEDURE — 82805 BLOOD GASES W/O2 SATURATION: CPT

## 2021-11-30 PROCEDURE — 82746 ASSAY OF FOLIC ACID SERUM: CPT

## 2021-11-30 PROCEDURE — 83605 ASSAY OF LACTIC ACID: CPT

## 2021-11-30 PROCEDURE — 2580000003 HC RX 258: Performed by: INTERNAL MEDICINE

## 2021-11-30 PROCEDURE — 99223 1ST HOSP IP/OBS HIGH 75: CPT | Performed by: INTERNAL MEDICINE

## 2021-11-30 PROCEDURE — 94640 AIRWAY INHALATION TREATMENT: CPT

## 2021-11-30 PROCEDURE — C9113 INJ PANTOPRAZOLE SODIUM, VIA: HCPCS | Performed by: STUDENT IN AN ORGANIZED HEALTH CARE EDUCATION/TRAINING PROGRAM

## 2021-11-30 PROCEDURE — 84100 ASSAY OF PHOSPHORUS: CPT

## 2021-11-30 PROCEDURE — 84145 PROCALCITONIN (PCT): CPT

## 2021-11-30 PROCEDURE — 2580000003 HC RX 258: Performed by: EMERGENCY MEDICINE

## 2021-11-30 PROCEDURE — 85025 COMPLETE CBC W/AUTO DIFF WBC: CPT

## 2021-11-30 PROCEDURE — 36415 COLL VENOUS BLD VENIPUNCTURE: CPT

## 2021-11-30 PROCEDURE — 2500000003 HC RX 250 WO HCPCS: Performed by: INTERNAL MEDICINE

## 2021-11-30 PROCEDURE — 82607 VITAMIN B-12: CPT

## 2021-11-30 PROCEDURE — 2580000003 HC RX 258: Performed by: STUDENT IN AN ORGANIZED HEALTH CARE EDUCATION/TRAINING PROGRAM

## 2021-11-30 PROCEDURE — 80074 ACUTE HEPATITIS PANEL: CPT

## 2021-11-30 PROCEDURE — 80053 COMPREHEN METABOLIC PANEL: CPT

## 2021-11-30 PROCEDURE — 80048 BASIC METABOLIC PNL TOTAL CA: CPT

## 2021-11-30 PROCEDURE — 94003 VENT MGMT INPAT SUBQ DAY: CPT

## 2021-11-30 PROCEDURE — 71045 X-RAY EXAM CHEST 1 VIEW: CPT

## 2021-11-30 PROCEDURE — 6370000000 HC RX 637 (ALT 250 FOR IP): Performed by: STUDENT IN AN ORGANIZED HEALTH CARE EDUCATION/TRAINING PROGRAM

## 2021-11-30 PROCEDURE — 6370000000 HC RX 637 (ALT 250 FOR IP): Performed by: INTERNAL MEDICINE

## 2021-11-30 PROCEDURE — 83735 ASSAY OF MAGNESIUM: CPT

## 2021-11-30 PROCEDURE — 94799 UNLISTED PULMONARY SVC/PX: CPT

## 2021-11-30 PROCEDURE — 6370000000 HC RX 637 (ALT 250 FOR IP): Performed by: NURSE PRACTITIONER

## 2021-11-30 PROCEDURE — 82962 GLUCOSE BLOOD TEST: CPT

## 2021-11-30 RX ORDER — DEXTROSE MONOHYDRATE 50 MG/ML
100 INJECTION, SOLUTION INTRAVENOUS PRN
Status: DISCONTINUED | OUTPATIENT
Start: 2021-11-30 | End: 2021-12-03 | Stop reason: HOSPADM

## 2021-11-30 RX ORDER — PANTOPRAZOLE SODIUM 40 MG/10ML
40 INJECTION, POWDER, LYOPHILIZED, FOR SOLUTION INTRAVENOUS DAILY
Status: DISCONTINUED | OUTPATIENT
Start: 2021-11-30 | End: 2021-12-03

## 2021-11-30 RX ORDER — MIDAZOLAM HYDROCHLORIDE 2 MG/2ML
2 INJECTION, SOLUTION INTRAMUSCULAR; INTRAVENOUS
Status: DISCONTINUED | OUTPATIENT
Start: 2021-11-30 | End: 2021-12-03

## 2021-11-30 RX ORDER — METHYLPREDNISOLONE SODIUM SUCCINATE 40 MG/ML
40 INJECTION, POWDER, LYOPHILIZED, FOR SOLUTION INTRAMUSCULAR; INTRAVENOUS EVERY 6 HOURS
Status: DISCONTINUED | OUTPATIENT
Start: 2021-11-30 | End: 2021-12-01

## 2021-11-30 RX ORDER — ONDANSETRON 2 MG/ML
4 INJECTION INTRAMUSCULAR; INTRAVENOUS EVERY 6 HOURS PRN
Status: DISCONTINUED | OUTPATIENT
Start: 2021-11-30 | End: 2021-11-30

## 2021-11-30 RX ORDER — ACETAMINOPHEN 650 MG/1
650 SUPPOSITORY RECTAL EVERY 6 HOURS PRN
Status: DISCONTINUED | OUTPATIENT
Start: 2021-11-30 | End: 2021-12-03 | Stop reason: HOSPADM

## 2021-11-30 RX ORDER — ONDANSETRON 2 MG/ML
4 INJECTION INTRAMUSCULAR; INTRAVENOUS EVERY 6 HOURS PRN
Status: DISCONTINUED | OUTPATIENT
Start: 2021-11-30 | End: 2021-12-03 | Stop reason: HOSPADM

## 2021-11-30 RX ORDER — ONDANSETRON 4 MG/1
4 TABLET, ORALLY DISINTEGRATING ORAL EVERY 8 HOURS PRN
Status: DISCONTINUED | OUTPATIENT
Start: 2021-11-30 | End: 2021-12-03 | Stop reason: HOSPADM

## 2021-11-30 RX ORDER — NICOTINE POLACRILEX 4 MG
15 LOZENGE BUCCAL PRN
Status: DISCONTINUED | OUTPATIENT
Start: 2021-11-30 | End: 2021-12-03 | Stop reason: HOSPADM

## 2021-11-30 RX ORDER — POLYETHYLENE GLYCOL 3350 17 G/17G
17 POWDER, FOR SOLUTION ORAL DAILY PRN
Status: DISCONTINUED | OUTPATIENT
Start: 2021-11-30 | End: 2021-11-30

## 2021-11-30 RX ORDER — DEXTROSE MONOHYDRATE 25 G/50ML
12.5 INJECTION, SOLUTION INTRAVENOUS PRN
Status: DISCONTINUED | OUTPATIENT
Start: 2021-11-30 | End: 2021-12-03 | Stop reason: HOSPADM

## 2021-11-30 RX ORDER — ACETAMINOPHEN 325 MG/1
650 TABLET ORAL EVERY 6 HOURS PRN
Status: DISCONTINUED | OUTPATIENT
Start: 2021-11-30 | End: 2021-12-03 | Stop reason: HOSPADM

## 2021-11-30 RX ORDER — CHLORHEXIDINE GLUCONATE 0.12 MG/ML
15 RINSE ORAL 2 TIMES DAILY
Status: DISCONTINUED | OUTPATIENT
Start: 2021-11-30 | End: 2021-12-03 | Stop reason: HOSPADM

## 2021-11-30 RX ORDER — SODIUM CHLORIDE 0.9 % (FLUSH) 0.9 %
5-40 SYRINGE (ML) INJECTION EVERY 12 HOURS SCHEDULED
Status: DISCONTINUED | OUTPATIENT
Start: 2021-11-30 | End: 2021-12-01 | Stop reason: SDUPTHER

## 2021-11-30 RX ORDER — NICOTINE POLACRILEX 4 MG
15 LOZENGE BUCCAL PRN
Status: DISCONTINUED | OUTPATIENT
Start: 2021-11-30 | End: 2021-11-30 | Stop reason: SDUPTHER

## 2021-11-30 RX ORDER — DEXTROSE MONOHYDRATE 25 G/50ML
12.5 INJECTION, SOLUTION INTRAVENOUS PRN
Status: DISCONTINUED | OUTPATIENT
Start: 2021-11-30 | End: 2021-11-30 | Stop reason: SDUPTHER

## 2021-11-30 RX ORDER — DEXTROSE MONOHYDRATE 50 MG/ML
100 INJECTION, SOLUTION INTRAVENOUS PRN
Status: DISCONTINUED | OUTPATIENT
Start: 2021-11-30 | End: 2021-11-30 | Stop reason: SDUPTHER

## 2021-11-30 RX ORDER — SODIUM CHLORIDE 0.9 % (FLUSH) 0.9 %
5-40 SYRINGE (ML) INJECTION PRN
Status: DISCONTINUED | OUTPATIENT
Start: 2021-11-30 | End: 2021-12-01 | Stop reason: SDUPTHER

## 2021-11-30 RX ORDER — SODIUM CHLORIDE 9 MG/ML
25 INJECTION, SOLUTION INTRAVENOUS PRN
Status: DISCONTINUED | OUTPATIENT
Start: 2021-11-30 | End: 2021-12-01 | Stop reason: SDUPTHER

## 2021-11-30 RX ADMIN — IPRATROPIUM BROMIDE AND ALBUTEROL SULFATE 1 AMPULE: .5; 2.5 SOLUTION RESPIRATORY (INHALATION) at 09:09

## 2021-11-30 RX ADMIN — METHYLPREDNISOLONE SODIUM SUCCINATE 40 MG: 40 INJECTION, POWDER, FOR SOLUTION INTRAMUSCULAR; INTRAVENOUS at 01:58

## 2021-11-30 RX ADMIN — PROPOFOL 50 MCG/KG/MIN: 10 INJECTION, EMULSION INTRAVENOUS at 00:35

## 2021-11-30 RX ADMIN — PROPOFOL 50 MCG/KG/MIN: 10 INJECTION, EMULSION INTRAVENOUS at 03:57

## 2021-11-30 RX ADMIN — ARFORMOTEROL TARTRATE 15 MCG: 15 SOLUTION RESPIRATORY (INHALATION) at 20:29

## 2021-11-30 RX ADMIN — WATER 1000 MG: 1 INJECTION INTRAMUSCULAR; INTRAVENOUS; SUBCUTANEOUS at 01:58

## 2021-11-30 RX ADMIN — ASPIRIN 81 MG CHEWABLE TABLET 81 MG: 81 TABLET CHEWABLE at 08:24

## 2021-11-30 RX ADMIN — METHYLPREDNISOLONE SODIUM SUCCINATE 40 MG: 40 INJECTION, POWDER, FOR SOLUTION INTRAMUSCULAR; INTRAVENOUS at 12:21

## 2021-11-30 RX ADMIN — IPRATROPIUM BROMIDE AND ALBUTEROL SULFATE 1 AMPULE: .5; 2.5 SOLUTION RESPIRATORY (INHALATION) at 12:49

## 2021-11-30 RX ADMIN — BUDESONIDE 500 MCG: 0.5 INHALANT RESPIRATORY (INHALATION) at 09:10

## 2021-11-30 RX ADMIN — 0.12% CHLORHEXIDINE GLUCONATE 15 ML: 1.2 RINSE ORAL at 12:21

## 2021-11-30 RX ADMIN — DOXYCYCLINE 100 MG: 100 INJECTION, POWDER, LYOPHILIZED, FOR SOLUTION INTRAVENOUS at 13:09

## 2021-11-30 RX ADMIN — SODIUM CHLORIDE, PRESERVATIVE FREE 10 ML: 5 INJECTION INTRAVENOUS at 20:39

## 2021-11-30 RX ADMIN — INSULIN LISPRO 1 UNITS: 100 INJECTION, SOLUTION INTRAVENOUS; SUBCUTANEOUS at 17:24

## 2021-11-30 RX ADMIN — ARFORMOTEROL TARTRATE 15 MCG: 15 SOLUTION RESPIRATORY (INHALATION) at 09:09

## 2021-11-30 RX ADMIN — IPRATROPIUM BROMIDE AND ALBUTEROL SULFATE 1 AMPULE: .5; 2.5 SOLUTION RESPIRATORY (INHALATION) at 20:29

## 2021-11-30 RX ADMIN — IPRATROPIUM BROMIDE AND ALBUTEROL SULFATE 1 AMPULE: .5; 2.5 SOLUTION RESPIRATORY (INHALATION) at 00:11

## 2021-11-30 RX ADMIN — SODIUM CHLORIDE, PRESERVATIVE FREE 10 ML: 5 INJECTION INTRAVENOUS at 08:25

## 2021-11-30 RX ADMIN — CEFEPIME HYDROCHLORIDE 2000 MG: 2 INJECTION, POWDER, FOR SOLUTION INTRAVENOUS at 23:18

## 2021-11-30 RX ADMIN — DOXYCYCLINE 100 MG: 100 INJECTION, POWDER, LYOPHILIZED, FOR SOLUTION INTRAVENOUS at 01:58

## 2021-11-30 RX ADMIN — METHYLPREDNISOLONE SODIUM SUCCINATE 40 MG: 40 INJECTION, POWDER, FOR SOLUTION INTRAMUSCULAR; INTRAVENOUS at 20:47

## 2021-11-30 RX ADMIN — BUDESONIDE 500 MCG: 0.5 INHALANT RESPIRATORY (INHALATION) at 20:29

## 2021-11-30 RX ADMIN — INSULIN LISPRO 1 UNITS: 100 INJECTION, SOLUTION INTRAVENOUS; SUBCUTANEOUS at 12:21

## 2021-11-30 RX ADMIN — PANTOPRAZOLE SODIUM 40 MG: 40 INJECTION, POWDER, FOR SOLUTION INTRAVENOUS at 08:25

## 2021-11-30 RX ADMIN — CEFEPIME HYDROCHLORIDE 2000 MG: 2 INJECTION, POWDER, FOR SOLUTION INTRAVENOUS at 14:29

## 2021-11-30 RX ADMIN — ENOXAPARIN SODIUM 40 MG: 100 INJECTION SUBCUTANEOUS at 08:24

## 2021-11-30 RX ADMIN — IPRATROPIUM BROMIDE AND ALBUTEROL SULFATE 1 AMPULE: .5; 2.5 SOLUTION RESPIRATORY (INHALATION) at 15:34

## 2021-11-30 RX ADMIN — SODIUM CHLORIDE: 9 INJECTION, SOLUTION INTRAVENOUS at 03:56

## 2021-11-30 RX ADMIN — Medication 125 MCG/HR: at 10:29

## 2021-11-30 RX ADMIN — PROPOFOL 50 MCG/KG/MIN: 10 INJECTION, EMULSION INTRAVENOUS at 08:25

## 2021-11-30 RX ADMIN — INSULIN LISPRO 1 UNITS: 100 INJECTION, SOLUTION INTRAVENOUS; SUBCUTANEOUS at 08:25

## 2021-11-30 ASSESSMENT — PULMONARY FUNCTION TESTS
PIF_VALUE: 34
PIF_VALUE: 42
PIF_VALUE: 30
PIF_VALUE: 15
PIF_VALUE: 30
PIF_VALUE: 30
PIF_VALUE: 29
PIF_VALUE: 47
PIF_VALUE: 32
PIF_VALUE: 31
PIF_VALUE: 16
PIF_VALUE: 16
PIF_VALUE: 15
PIF_VALUE: 30
PIF_VALUE: 32
PIF_VALUE: 34
PIF_VALUE: 16
PIF_VALUE: 29
PIF_VALUE: 16

## 2021-11-30 ASSESSMENT — PAIN SCALES - GENERAL
PAINLEVEL_OUTOF10: 0

## 2021-11-30 NOTE — ED NOTES
Report given to Ralf Bansal, ICU RN; she will notify when bed is clean     Gia Alexander RN  11/30/21 1725

## 2021-11-30 NOTE — PROGRESS NOTES
Hospitalist Progress Note        SUBJECTIVE:    Patient seen and examined in the ER this morning. Currently intubated and sedated. Records reviewed. Stable overnight. No other overnight issues reported. Temp (24hrs), Av.9 °F (36.1 °C), Min:96.6 °F (35.9 °C), Max:97.2 °F (36.2 °C)    DIET: Diet NPO  CODE: Full Code    Intake/Output Summary (Last 24 hours) at 2021 1336  Last data filed at 2021 1200  Gross per 24 hour   Intake 2393.22 ml   Output 2335 ml   Net 58.22 ml       OBJECTIVE:    /70   Pulse 99   Temp 96.8 °F (36 °C) (Temporal)   Resp 15   Wt 152 lb 5.4 oz (69.1 kg)   LMP 2021   SpO2 100%   BMI 29.75 kg/m²     General appearance: No apparent distress sedated . respiratory: Intubated, on mechanical ventilation. Coarse breath sounds bilaterally   Cardiovascular: Regular rate rhythm, normal S1-S2  Abdomen: Soft, nontender, nondistended  Musculoskeletal: No bilateral lower extremity edema. Neurologic: Sedated    ASSESSMENT AND PLAN:    Patient, 59-year-old female with past medical history of COPD with chronic hypoxic respiratory failure on supplemental oxygen via nasal cannula between 1 to 2 L/min, GERD, was admitted on 2021 for evaluation of acute onset of shortness of breath. Patient was intubated in the emergency room and required pressor support with Levophed. Levophed was successfully weaned off. Acute on chronic hypoxic/hypercapnic respiratory failure secondary to acute COPD exacerbation  -Currently intubated and sedated.   FiO2 40%, PEEP of 5  -Critical care consulted-apprec crecs  -consulted pulmonary-apprec recs  -Continue Solu-Medrol/DuoNeb    Bilateral lung opacities  -Likely developing pneumonia as per CTA chest  -No evidence of acute PE  -Continue ceftriaxone/doxycycline  - urine Legionella/streptococcal antigen-presumptive neg    Septic shock  -Likely secondary to above  - Blood c/s -24hrs  NG  -Currently off of pressor support  -Continue antibiotics as above    Elevated troponin  -Cardiology evaluation appreciated. Elevated troponin reflective of acute myocardial injury in the setting of COPD exacerbation. No clinical evidence of ischemia per EKG. -As per cardiology, continue aspirin/beta-blocker. No statin therapy given low LDL. No further work-up recommended from cardiology standpoint.     DVT prophylaxis  -Lovenox subcu      DISPOSITION: Pending clinical improvement    Medications:  REVIEWED DAILY    Infusion Medications    dextrose      sodium chloride      dextrose      fentaNYL 5 mcg/ml in 0.9%  ml infusion 125 mcg/hr (11/30/21 1029)     Scheduled Medications    sodium chloride flush  5-40 mL IntraVENous 2 times per day    enoxaparin  40 mg SubCUTAneous Daily    pantoprazole  40 mg IntraVENous Daily    chlorhexidine  15 mL Mouth/Throat BID    insulin lispro  0-6 Units SubCUTAneous Q4H    methylPREDNISolone  40 mg IntraVENous Q6H    ipratropium-albuterol  1 ampule Inhalation Q4H While awake    budesonide  0.5 mg Nebulization BID    Arformoterol Tartrate  15 mcg Nebulization BID    cefTRIAXone (ROCEPHIN) IV  1,000 mg IntraVENous Q24H    doxycycline (VIBRAMYCIN) IV  100 mg IntraVENous Q12H    aspirin  81 mg Oral Daily     PRN Meds: glucose, dextrose, glucagon (rDNA), dextrose, midazolam, sodium chloride flush, sodium chloride, ondansetron **OR** [DISCONTINUED] ondansetron, acetaminophen **OR** acetaminophen, ondansetron, glucose, dextrose, glucagon (rDNA), dextrose, polyethylene glycol, potassium chloride, magnesium sulfate, perflutren lipid microspheres    Labs:     Recent Labs     11/28/21  0806 11/29/21  0555 11/30/21  0536   WBC 16.3* 16.2* 11.7*   HGB 11.6 11.2* 10.3*   HCT 35.4 34.1 31.0*    265 281       Recent Labs     11/28/21  0806 11/29/21  0555 11/30/21  0536     141 142 138  139   K 3.8  3.8 3.5 3.6  3.6     101 100 99  99   CO2 27  26 28 29  29   BUN 10  9 13 16  16   CREATININE 0.7  0.7 0.6 0.5  0.5   CALCIUM 8.7  8.4* 9.1 8.9  8.9   PHOS 3.0  --  4.1       Recent Labs     11/28/21  0806 11/29/21  0555 11/30/21  0536   PROT 6.1*  6.0* 6.4 5.9*   ALKPHOS 125*  121* 99 81   ALT 75*  76* 50* 35*   *  104* 27 14   BILITOT 0.2  <0.2 <0.2 <0.2       No results for input(s): INR in the last 72 hours. No results for input(s): [place] in the last 72 hours. Chronic labs:    Lab Results   Component Value Date    CHOL 150 11/28/2021    TRIG 353 (H) 11/28/2021    HDL 59 11/28/2021    LDLCALC 20 11/28/2021    TSH 0.402 11/28/2021    INR 1.1 04/16/2013    LABA1C 6.2 (H) 11/28/2021       Radiology: REVIEWED DAILY    +++++++++++++++++++++++++++++++++++++++++++++++++  Griffin Pereyra MD  [employer] - Hospitalist  [address], [place]  +++++++++++++++++++++++++++++++++++++++++++++++++  NOTE: This report was transcribed using voice recognition software. Every effort was made to ensure accuracy; however, inadvertent computerized transcription errors may be present.

## 2021-11-30 NOTE — PLAN OF CARE
Problem: Non-Violent Restraints  Goal: Removal from restraints as soon as assessed to be safe  11/30/2021 1838 by Quentin Arshad RN  Outcome: Met This Shift  11/30/2021 0616 by Esdras Balbuena  Outcome: Not Met This Shift  Goal: No harm/injury to patient while restraints in use  11/30/2021 1838 by Quentin Arshad RN  Outcome: Met This Shift  11/30/2021 0616 by Esdras Balbuena  Outcome: Met This Shift  Goal: Patient's dignity will be maintained  11/30/2021 1838 by Quentin Arshad RN  Outcome: Met This Shift  11/30/2021 0616 by Esdras Balbuena  Outcome: Met This Shift     Problem: Skin Integrity:  Goal: Will show no infection signs and symptoms  Description: Will show no infection signs and symptoms  11/30/2021 1838 by Quentin Arshad RN  Outcome: Met This Shift  11/30/2021 0616 by Esdras Balbuena  Outcome: Met This Shift  Goal: Absence of new skin breakdown  Description: Absence of new skin breakdown  11/30/2021 1838 by Quentin Arshad RN  Outcome: Met This Shift  11/30/2021 0616 by Esdras Balbuena  Outcome: Met This Shift     Problem: Falls - Risk of:  Goal: Will remain free from falls  Description: Will remain free from falls  11/30/2021 1838 by Quentin Arshad RN  Outcome: Met This Shift  11/30/2021 0616 by Esdras Balbuena  Outcome: Met This Shift  Goal: Absence of physical injury  Description: Absence of physical injury  11/30/2021 1838 by Quentin Arshad RN  Outcome: Met This Shift  11/30/2021 0616 by Esdras Balbuena  Outcome: Met This Shift

## 2021-11-30 NOTE — CARE COORDINATION
11/30: Transition of care: Pt came into the ER with Sob. PMH Copd & 02 at home.  called CM & advised that his wife had a right knee replacement apprx 6months ago. Pt was intubated on 11/28 & transferred to MICU. Pt is in bilateral wrist restraints, Iv levophed gtt, Iv solumedrol 40 Q 12hrs, Iv Rocephin Q 24hrs & Iv Doxy Q 12hrs. Pt's PCP is  & uses [facility]. Pt lives with her  with 2 steps to enter. The bed/bathroom are on the 1st level. Pt uses 02 at home recently 2 liters continuously.  is unsure what the name of the company is. Pt has a walker, bedside commode & shower chair at home, Pt's d/c plan is home & has no preferences for DME/HHC at this point.  can transport her home. Needs unclear at this point. [facility] is following, [facility] will continue to follow.  Electronically signed by Kaylyn Quinonez RN on 11/30/2021 at 10:38 AM

## 2021-11-30 NOTE — ED NOTES
Instructed from pulmonary intensivist to paralyze; requested orders to be placed; awaiting orders     Debra Osborne RN  11/29/21 2047

## 2021-11-30 NOTE — ED NOTES
Verbal order from Dr Juancho Hendrix for versed 4mg iv once; order repeated back and verified then placed in computer     Zac Valdivia RN  11/29/21 [address]

## 2021-11-30 NOTE — PLAN OF CARE
Problem: Non-Violent Restraints  Goal: Removal from restraints as soon as assessed to be safe  Outcome: Not Met This Shift     Problem: Non-Violent Restraints  Goal: No harm/injury to patient while restraints in use  Outcome: Met This Shift     Problem: Non-Violent Restraints  Goal: Patient's dignity will be maintained  Outcome: Met This Shift     Problem: Skin Integrity:  Goal: Will show no infection signs and symptoms  Description: Will show no infection signs and symptoms  Outcome: Met This Shift     Problem: Skin Integrity:  Goal: Absence of new skin breakdown  Description: Absence of new skin breakdown  Outcome: Met This Shift     Problem: Falls - Risk of:  Goal: Will remain free from falls  Description: Will remain free from falls  Outcome: Met This Shift     Problem: Falls - Risk of:  Goal: Absence of physical injury  Description: Absence of physical injury  Outcome: Met This Shift

## 2021-11-30 NOTE — PROGRESS NOTES
Spontaneous Parameters performed    VT = 379 ml  f = 17  B/M  Ve = 7.4 L/M  NIF = -31  cmH2O  VC = 1.3 L  RSBI = 45      Performed by Marleni Steen RCP

## 2021-11-30 NOTE — ED NOTES
Informed by internal med physician to ask intensivist or ed physician for orders     Brando Dasilva, TESSIE  11/29/21 3886

## 2021-11-30 NOTE — CONSULTS
Pulmonary Consultation    Admit Date: 11/27/2021    Requesting Physician: Judith Barron DO    CC:     HPI:  This is a 45 y.o. female with history of COPD, smokes 2 packs a day, GERD, psoriatic arthritis with pain, bipolar disorder anxiety, chronic back pain, chronic hypoxic respiratory failure on 2 L  9/22/2021 was treated with steroids and Z-Keegan as an outpatient. Discussed with the . Patient has been on and off steroids for years. She uses this also for her treatment for psoriatic arthritis. She has history of anxiety does not see a psychiatrist  Psoriatic arthritis does not want to go to a rheumatologist due to cost.    PMH:    Past Medical History:   Diagnosis Date    Anxiety     Arthritis     Asthma     Chronic back pain     Chronic lower back pain     Chronic neck pain     COPD     COPD exacerbation ([address] 12/26/2019    Difficult intravenous access     and venipuncture    Fibrocystic breast changes     GERD (gastroesophageal reflux disease)     Hiatal hernia    Herpes zoster ophthalmicus     Irritable bowel syndrome     Obesity     On supplemental oxygen therapy     1l/min/nc nightly and daily prn    Psoriasis     Right knee pain 07/2021    for Ora Dame 07/2021    RSV bronchitis 12/30/2019    Tinea versicolor     Wears contact lenses     Wears dentures     upper    Wears glasses      12/27/2019 admitted for COPD exacerbation due to RSV tracheobronchitis  5/4/2019 presented to ER treated for COPD exacerbation. Was given prednisone and azithromycin. 12/26/2019 seen in ER for COPD exacerbation was given prednisone  3/25/2021 was having knee pain after cleaning on the floor.   7/12/2021 underwent right knee replacement in the physical therapy afterwards    PSH:   Past Surgical History:   Procedure Laterality Date    CHOLECYSTECTOMY  11/2003    HERNIA REPAIR  53/0788    umbilical    KNEE ARTHROSCOPY Right     LAPAROSCOPY      LEEP      LYMPH NODE BIOPSY      TOTAL KNEE ARTHROPLASTY Right 7/12/2021    RIGHT KNEE JOHNNIE ROBOTIC ASSISTED TOTAL ARTHROPLASTY  PNB performed by Loren Dacosta MD at [facility] OR          Medications:     dextrose      sodium chloride      dextrose      norepinephrine      propofol 50 mcg/kg/min (11/30/21 0825)    fentaNYL 5 mcg/ml in 0.9%  ml infusion 125 mcg/hr (11/30/21 1029)    sodium chloride 100 mL/hr at 11/30/21 0800      insulin lispro  0-6 Units SubCUTAneous TID WC    insulin lispro  0-3 Units SubCUTAneous Nightly    sodium chloride flush  5-40 mL IntraVENous 2 times per day    enoxaparin  40 mg SubCUTAneous Daily    pantoprazole  40 mg IntraVENous Daily    methylPREDNISolone  40 mg IntraVENous Q12H    ipratropium-albuterol  1 ampule Inhalation Q4H While awake    budesonide  0.5 mg Nebulization BID    Arformoterol Tartrate  15 mcg Nebulization BID    cefTRIAXone (ROCEPHIN) IV  1,000 mg IntraVENous Q24H    doxycycline (VIBRAMYCIN) IV  100 mg IntraVENous Q12H    fentanNYL  50 mcg IntraVENous Once    aspirin  81 mg Oral Daily       Allergies: Allergies   Allergen Reactions    Codeine Hives    Sulfa Antibiotics Anaphylaxis and Hives    Demerol Hcl [Meperidine] Nausea And Vomiting       Social History:    30-pack-year smoker was smoking a pack and a half a day. She has 2 dogs. No birds  Works as a  made.  2 kids      Family history: Mother had Hodgkin's lymphoma age 40  Sister with diabetes hypertension gout  Father with psoriasis diabetes hypertension kidney disease      Respiratory ROS: positive for - cough Otherwise, a complete review of systems is undertaken and is negative.        Physical Examination    Vitals:  VITALS:  /77   Pulse 102   Temp 96.8 °F (36 °C) (Temporal)   Resp 26   Wt 152 lb 5.4 oz (69.1 kg)   LMP 05/07/2021   SpO2 97%   BMI 29.75 kg/m²   24HR INTAKE/OUTPUT:      Intake/Output Summary (Last 24 hours) at 11/30/2021 1042  Last data filed at 11/30/2021 1000  Gross per 24 hour   Intake 2393.22 ml   Output 2275 ml   Net 118.22 ml         General: No distress. Alert. Awake alert despite him being on propofol and fentanyl  Eyes: PERRL. No sclera icterus. ENT: No discharge. Pharynx clear. Nasal septum midline   Neck: Trachea midline. Normal thyroid. no JVD  Resp: No accessory muscle use. No crackles. No wheezing. No rhonchi. Symmetrical exansion  CV: PMI non displaced. Regular rate. Regular rhythm. No mumur or rub. ABD: Non-tender. Non-distended. No organmegaly. Normal bowel sounds. Skin: Warm and dry. No rash on exposed extremities. Lymph: No cervical LAD. No supraclavicular LAD. Ext: No joint deformity. No clubbing. No cyanosis. No edema scar over right knee  Neuro: Awake. Follows commands. No focal deficits. Moves all ext     Lab Results:    CBC:   Recent Labs     11/28/21  0806 11/29/21  0555 11/30/21  0536   WBC 16.3* 16.2* 11.7*   HGB 11.6 11.2* 10.3*   HCT 35.4 34.1 31.0*   .7* 104.0* 104.0*    265 281     BMP:   Recent Labs     11/28/21  0806 11/29/21  0555 11/30/21  0536     141 142 138  139   K 3.8  3.8 3.5 3.6  3.6     101 100 99  99   CO2 27  26 28 29  29   PHOS 3.0  --  4.1   BUN 10  9 13 16  16   CREATININE 0.7  0.7 0.6 0.5  0.5      ALB:3,BILIDIR:3,BILITOT:3,ALKPHOS:3)@  PT/INR: No results for input(s): PROTIME, INR in the last 72 hours. Cultures:  -[unfilled]     Films:  CXR 11/27 atelectasis at bases  CT 11/28 shows no PE     Assessment/Plan:  ·  45 y. o.female chronic hypoxic respiratory failure on 2 L, vaccinated for Covid COPD with asthma who presented with shortness of breath x1 week and saturating 91% on 6 L on 11/27/2021  · Viral panel negative Covid negative  · Patient did not tolerate BiPAP required intubation  · Was started on pressors  · 11/27/2021 atelectatic changes at the bases  · 11/28 CTA no PE  · Legionella strep pneumonia antigen negative  11/28 off of Levophed  11/30 on fentanyl propofol AC/20/380/40% FiO2 chest x-ray negative      1. Acute on chronic hypoxic and hypercapnic respiratory failure on mechanical ventilation  2. Pneumonia  3. Elevated troponins felt to be due to demand  4. COPD on Trelegy  5. Community-acquired pneumonia  6. Hypokalemia  7. History of psoriatic arthritis for which she was using steroids on and off  8. Elevated transaminases on presentation improved  9. Status post right knee surgery 7/21      On IV steroids  Required pressors for short time after intubation  Able to tolerate these sedation  because she does drink wine daily 2 glasses and takes 2 Norco's a day. She had her knee surgery done in July. She should be off of narcotics by now. She is awake alert should be able to be extubated counseled on smoking cessation  Thanks for letting us see this patient in consultation. Please contact us with any questions.       Electronically signed by Lesa Felty, MD on 11/30/2021 at 10:42 AM

## 2021-11-30 NOTE — ED NOTES
Instructed by pulmonary intensivist to have ED physician address issue; ED physician notified     Claudell Goldsmith, RN  11/29/21 6446

## 2021-11-30 NOTE — ED NOTES
Nicole sent to internal med physician to notify of pt being awake alert and on ventilator respiratory requesting more sedation or change due to patient fighting ventilator      Prasad Dickens RN  11/29/21 8458

## 2021-11-30 NOTE — ED NOTES
victor manuel sent to Pulmonary intensivist requesting increased sedation or new orders awaiting response     Molina Ling RN  11/29/21 8070

## 2021-11-30 NOTE — PROGRESS NOTES
Patient was extubated to 3 liters/min via aerosol face mask. Stridor was not present post extubation. SPO2 was 100%. Patient alert and oriented x3. Patient suctioned pre and post extubation. Cuff deflated prior to extubation.        Performed by  Josefina Fishman RCP

## 2021-12-01 LAB
ALBUMIN SERPL-MCNC: 3.1 G/DL (ref 3.5–5.2)
ALP BLD-CCNC: 81 U/L (ref 35–104)
ALT SERPL-CCNC: 45 U/L (ref 0–32)
ANION GAP SERPL CALCULATED.3IONS-SCNC: 8 MMOL/L (ref 7–16)
AST SERPL-CCNC: 32 U/L (ref 0–31)
BASOPHILS ABSOLUTE: 0.02 E9/L (ref 0–0.2)
BASOPHILS RELATIVE PERCENT: 0.2 % (ref 0–2)
BILIRUB SERPL-MCNC: 0.2 MG/DL (ref 0–1.2)
BUN BLDV-MCNC: 18 MG/DL (ref 6–20)
CALCIUM SERPL-MCNC: 9.1 MG/DL (ref 8.6–10.2)
CHLORIDE BLD-SCNC: 104 MMOL/L (ref 98–107)
CO2: 31 MMOL/L (ref 22–29)
CREAT SERPL-MCNC: 0.5 MG/DL (ref 0.5–1)
EOSINOPHILS ABSOLUTE: 0 E9/L (ref 0.05–0.5)
EOSINOPHILS RELATIVE PERCENT: 0 % (ref 0–6)
GFR AFRICAN AMERICAN: >60
GFR NON-AFRICAN AMERICAN: >60 ML/MIN/1.73
GLUCOSE BLD-MCNC: 126 MG/DL (ref 74–99)
HCT VFR BLD CALC: 32.6 % (ref 34–48)
HEMOGLOBIN: 10.7 G/DL (ref 11.5–15.5)
IMMATURE GRANULOCYTES #: 0.21 E9/L
IMMATURE GRANULOCYTES %: 1.6 % (ref 0–5)
LYMPHOCYTES ABSOLUTE: 1.03 E9/L (ref 1.5–4)
LYMPHOCYTES RELATIVE PERCENT: 8 % (ref 20–42)
MCH RBC QN AUTO: 33.4 PG (ref 26–35)
MCHC RBC AUTO-ENTMCNC: 32.8 % (ref 32–34.5)
MCV RBC AUTO: 101.9 FL (ref 80–99.9)
METER GLUCOSE: 129 MG/DL (ref 74–99)
METER GLUCOSE: 137 MG/DL (ref 74–99)
METER GLUCOSE: 197 MG/DL (ref 74–99)
METER GLUCOSE: 199 MG/DL (ref 74–99)
MONOCYTES ABSOLUTE: 0.64 E9/L (ref 0.1–0.95)
MONOCYTES RELATIVE PERCENT: 5 % (ref 2–12)
NEUTROPHILS ABSOLUTE: 10.91 E9/L (ref 1.8–7.3)
NEUTROPHILS RELATIVE PERCENT: 85.2 % (ref 43–80)
PDW BLD-RTO: 13 FL (ref 11.5–15)
PLATELET # BLD: 324 E9/L (ref 130–450)
PMV BLD AUTO: 9.6 FL (ref 7–12)
POTASSIUM REFLEX MAGNESIUM: 3.9 MMOL/L (ref 3.5–5)
POTASSIUM SERPL-SCNC: 3.9 MMOL/L (ref 3.5–5)
RBC # BLD: 3.2 E12/L (ref 3.5–5.5)
SODIUM BLD-SCNC: 143 MMOL/L (ref 132–146)
TOTAL PROTEIN: 6.1 G/DL (ref 6.4–8.3)
WBC # BLD: 12.8 E9/L (ref 4.5–11.5)

## 2021-12-01 PROCEDURE — 6370000000 HC RX 637 (ALT 250 FOR IP): Performed by: INTERNAL MEDICINE

## 2021-12-01 PROCEDURE — 2580000003 HC RX 258: Performed by: INTERNAL MEDICINE

## 2021-12-01 PROCEDURE — 2580000003 HC RX 258: Performed by: STUDENT IN AN ORGANIZED HEALTH CARE EDUCATION/TRAINING PROGRAM

## 2021-12-01 PROCEDURE — 82962 GLUCOSE BLOOD TEST: CPT

## 2021-12-01 PROCEDURE — 94640 AIRWAY INHALATION TREATMENT: CPT

## 2021-12-01 PROCEDURE — 1200000000 HC SEMI PRIVATE

## 2021-12-01 PROCEDURE — 6370000000 HC RX 637 (ALT 250 FOR IP): Performed by: NURSE PRACTITIONER

## 2021-12-01 PROCEDURE — 6360000002 HC RX W HCPCS: Performed by: INTERNAL MEDICINE

## 2021-12-01 PROCEDURE — 85025 COMPLETE CBC W/AUTO DIFF WBC: CPT

## 2021-12-01 PROCEDURE — 2700000000 HC OXYGEN THERAPY PER DAY

## 2021-12-01 PROCEDURE — 6360000002 HC RX W HCPCS: Performed by: STUDENT IN AN ORGANIZED HEALTH CARE EDUCATION/TRAINING PROGRAM

## 2021-12-01 PROCEDURE — 80053 COMPREHEN METABOLIC PANEL: CPT

## 2021-12-01 PROCEDURE — C9113 INJ PANTOPRAZOLE SODIUM, VIA: HCPCS | Performed by: STUDENT IN AN ORGANIZED HEALTH CARE EDUCATION/TRAINING PROGRAM

## 2021-12-01 PROCEDURE — C1751 CATH, INF, PER/CENT/MIDLINE: HCPCS

## 2021-12-01 PROCEDURE — 80048 BASIC METABOLIC PNL TOTAL CA: CPT

## 2021-12-01 PROCEDURE — 76937 US GUIDE VASCULAR ACCESS: CPT

## 2021-12-01 PROCEDURE — 05HY33Z INSERTION OF INFUSION DEVICE INTO UPPER VEIN, PERCUTANEOUS APPROACH: ICD-10-PCS | Performed by: INTERNAL MEDICINE

## 2021-12-01 PROCEDURE — 6370000000 HC RX 637 (ALT 250 FOR IP): Performed by: STUDENT IN AN ORGANIZED HEALTH CARE EDUCATION/TRAINING PROGRAM

## 2021-12-01 PROCEDURE — 2500000003 HC RX 250 WO HCPCS: Performed by: INTERNAL MEDICINE

## 2021-12-01 PROCEDURE — 36415 COLL VENOUS BLD VENIPUNCTURE: CPT

## 2021-12-01 PROCEDURE — 6360000002 HC RX W HCPCS: Performed by: NURSE PRACTITIONER

## 2021-12-01 PROCEDURE — 2500000003 HC RX 250 WO HCPCS: Performed by: STUDENT IN AN ORGANIZED HEALTH CARE EDUCATION/TRAINING PROGRAM

## 2021-12-01 PROCEDURE — 36569 INSJ PICC 5 YR+ W/O IMAGING: CPT

## 2021-12-01 RX ORDER — LIDOCAINE HYDROCHLORIDE 10 MG/ML
5 INJECTION, SOLUTION EPIDURAL; INFILTRATION; INTRACAUDAL; PERINEURAL ONCE
Status: COMPLETED | OUTPATIENT
Start: 2021-12-01 | End: 2021-12-01

## 2021-12-01 RX ORDER — SODIUM CHLORIDE 0.9 % (FLUSH) 0.9 %
5-40 SYRINGE (ML) INJECTION EVERY 12 HOURS SCHEDULED
Status: DISCONTINUED | OUTPATIENT
Start: 2021-12-01 | End: 2021-12-03 | Stop reason: HOSPADM

## 2021-12-01 RX ORDER — METHYLPREDNISOLONE SODIUM SUCCINATE 40 MG/ML
40 INJECTION, POWDER, LYOPHILIZED, FOR SOLUTION INTRAMUSCULAR; INTRAVENOUS EVERY 8 HOURS
Status: DISCONTINUED | OUTPATIENT
Start: 2021-12-01 | End: 2021-12-02

## 2021-12-01 RX ORDER — SODIUM CHLORIDE 0.9 % (FLUSH) 0.9 %
5-40 SYRINGE (ML) INJECTION PRN
Status: DISCONTINUED | OUTPATIENT
Start: 2021-12-01 | End: 2021-12-03 | Stop reason: HOSPADM

## 2021-12-01 RX ORDER — SODIUM CHLORIDE 9 MG/ML
25 INJECTION, SOLUTION INTRAVENOUS PRN
Status: DISCONTINUED | OUTPATIENT
Start: 2021-12-01 | End: 2021-12-03

## 2021-12-01 RX ORDER — HEPARIN SODIUM (PORCINE) LOCK FLUSH IV SOLN 100 UNIT/ML 100 UNIT/ML
3 SOLUTION INTRAVENOUS EVERY 12 HOURS SCHEDULED
Status: DISCONTINUED | OUTPATIENT
Start: 2021-12-01 | End: 2021-12-03 | Stop reason: HOSPADM

## 2021-12-01 RX ORDER — HEPARIN SODIUM (PORCINE) LOCK FLUSH IV SOLN 100 UNIT/ML 100 UNIT/ML
3 SOLUTION INTRAVENOUS PRN
Status: DISCONTINUED | OUTPATIENT
Start: 2021-12-01 | End: 2021-12-03 | Stop reason: HOSPADM

## 2021-12-01 RX ORDER — FOLIC ACID 1 MG/1
1 TABLET ORAL DAILY
Status: DISCONTINUED | OUTPATIENT
Start: 2021-12-01 | End: 2021-12-03 | Stop reason: HOSPADM

## 2021-12-01 RX ADMIN — FOLIC ACID 1 MG: 1 TABLET ORAL at 13:22

## 2021-12-01 RX ADMIN — INSULIN LISPRO 1 UNITS: 100 INJECTION, SOLUTION INTRAVENOUS; SUBCUTANEOUS at 13:22

## 2021-12-01 RX ADMIN — SODIUM CHLORIDE, PRESERVATIVE FREE 10 ML: 5 INJECTION INTRAVENOUS at 09:14

## 2021-12-01 RX ADMIN — INSULIN LISPRO 1 UNITS: 100 INJECTION, SOLUTION INTRAVENOUS; SUBCUTANEOUS at 01:06

## 2021-12-01 RX ADMIN — BUDESONIDE 500 MCG: 0.5 INHALANT RESPIRATORY (INHALATION) at 21:10

## 2021-12-01 RX ADMIN — METHYLPREDNISOLONE SODIUM SUCCINATE 40 MG: 40 INJECTION, POWDER, FOR SOLUTION INTRAMUSCULAR; INTRAVENOUS at 04:05

## 2021-12-01 RX ADMIN — IPRATROPIUM BROMIDE AND ALBUTEROL SULFATE 1 AMPULE: .5; 2.5 SOLUTION RESPIRATORY (INHALATION) at 09:30

## 2021-12-01 RX ADMIN — IPRATROPIUM BROMIDE AND ALBUTEROL SULFATE 1 AMPULE: .5; 2.5 SOLUTION RESPIRATORY (INHALATION) at 13:17

## 2021-12-01 RX ADMIN — METHYLPREDNISOLONE SODIUM SUCCINATE 40 MG: 40 INJECTION, POWDER, FOR SOLUTION INTRAMUSCULAR; INTRAVENOUS at 13:22

## 2021-12-01 RX ADMIN — ARFORMOTEROL TARTRATE 15 MCG: 15 SOLUTION RESPIRATORY (INHALATION) at 09:30

## 2021-12-01 RX ADMIN — ARFORMOTEROL TARTRATE 15 MCG: 15 SOLUTION RESPIRATORY (INHALATION) at 21:10

## 2021-12-01 RX ADMIN — DOXYCYCLINE 100 MG: 100 INJECTION, POWDER, LYOPHILIZED, FOR SOLUTION INTRAVENOUS at 19:58

## 2021-12-01 RX ADMIN — IPRATROPIUM BROMIDE AND ALBUTEROL SULFATE 1 AMPULE: .5; 2.5 SOLUTION RESPIRATORY (INHALATION) at 16:15

## 2021-12-01 RX ADMIN — METHYLPREDNISOLONE SODIUM SUCCINATE 40 MG: 40 INJECTION, POWDER, FOR SOLUTION INTRAMUSCULAR; INTRAVENOUS at 13:32

## 2021-12-01 RX ADMIN — CEFEPIME HYDROCHLORIDE 2000 MG: 2 INJECTION, POWDER, FOR SOLUTION INTRAVENOUS at 06:36

## 2021-12-01 RX ADMIN — DOXYCYCLINE 100 MG: 100 INJECTION, POWDER, LYOPHILIZED, FOR SOLUTION INTRAVENOUS at 09:30

## 2021-12-01 RX ADMIN — BUDESONIDE 500 MCG: 0.5 INHALANT RESPIRATORY (INHALATION) at 09:30

## 2021-12-01 RX ADMIN — LIDOCAINE HYDROCHLORIDE 5 ML: 10 INJECTION, SOLUTION EPIDURAL; INFILTRATION; INTRACAUDAL; PERINEURAL at 14:00

## 2021-12-01 RX ADMIN — ASPIRIN 81 MG CHEWABLE TABLET 81 MG: 81 TABLET CHEWABLE at 09:00

## 2021-12-01 RX ADMIN — CEFEPIME HYDROCHLORIDE 2000 MG: 2 INJECTION, POWDER, FOR SOLUTION INTRAVENOUS at 14:50

## 2021-12-01 RX ADMIN — METHYLPREDNISOLONE SODIUM SUCCINATE 40 MG: 40 INJECTION, POWDER, FOR SOLUTION INTRAMUSCULAR; INTRAVENOUS at 19:46

## 2021-12-01 RX ADMIN — IPRATROPIUM BROMIDE AND ALBUTEROL SULFATE 1 AMPULE: .5; 2.5 SOLUTION RESPIRATORY (INHALATION) at 21:10

## 2021-12-01 RX ADMIN — ENOXAPARIN SODIUM 40 MG: 100 INJECTION SUBCUTANEOUS at 09:13

## 2021-12-01 RX ADMIN — PANTOPRAZOLE SODIUM 40 MG: 40 INJECTION, POWDER, FOR SOLUTION INTRAVENOUS at 09:13

## 2021-12-01 ASSESSMENT — PAIN SCALES - GENERAL
PAINLEVEL_OUTOF10: 0

## 2021-12-01 NOTE — PROGRESS NOTES
POWER CHG PICC 2 LUMEN Placement 12/1/2021    Product number: [id number]   Lot Number: [id number]      Ultrasound: YES WITH VPS TRACKING   Right Brachial vein:                Upper Arm Circumference: 27    Size: 5.5    Exposed Length: 1    Internal Length: 38   Cut: 16   Vein Measurement: 0.52    Paty Knutson RN  12/1/2021  2:11 PM

## 2021-12-01 NOTE — PROGRESS NOTES
Physical Therapy    Physical therapy orders received and chart review completed. Patient receiving PICC line at time of attempt. Will hold PT evaluation and will reattempt as appropriate.      Hui Mills PT, DPT  [id number]

## 2021-12-01 NOTE — PLAN OF CARE
Problem: Non-Violent Restraints  Goal: Removal from restraints as soon as assessed to be safe  12/1/2021 0008 by Jame Talley  Outcome: Completed     Problem: Non-Violent Restraints  Goal: No harm/injury to patient while restraints in use  12/1/2021 0008 by Jame Talley  Outcome: Completed     Problem: Non-Violent Restraints  Goal: Patient's dignity will be maintained  12/1/2021 0008 by Jame Talley  Outcome: Completed     Problem: Skin Integrity:  Goal: Will show no infection signs and symptoms  Description: Will show no infection signs and symptoms  12/1/2021 0008 by Jame Talley  Outcome: Met This Shift     Problem: Skin Integrity:  Goal: Absence of new skin breakdown  Description: Absence of new skin breakdown  12/1/2021 0008 by Jame Talley  Outcome: Met This Shift     Problem: Falls - Risk of:  Goal: Will remain free from falls  Description: Will remain free from falls  12/1/2021 0008 by Jame Talley  Outcome: Met This Shift     Problem: Falls - Risk of:  Goal: Absence of physical injury  Description: Absence of physical injury  12/1/2021 0008 by Jame Talley  Outcome: Met This Shift

## 2021-12-01 NOTE — PROGRESS NOTES
Hospitalist Progress Note        SUBJECTIVE:    Patient seen and examined in the ICU with plan to transfer to the floor. Patient was explained about the plan regarding weaning oxygen as tolerated. Physical/occupational therapy for strength. She does not have any questions or concerns at this time  Records reviewed. Stable overnight. No other overnight issues reported. Temp (24hrs), Av.6 °F (36.4 °C), Min:96.7 °F (35.9 °C), Max:98.4 °F (36.9 °C)    DIET: ADULT DIET; Regular  CODE: Full Code    Intake/Output Summary (Last 24 hours) at 2021 1124  Last data filed at 2021 0600  Gross per 24 hour   Intake 1337.62 ml   Output 935 ml   Net 402.62 ml       OBJECTIVE:    BP (!) 167/87   Pulse 97   Temp 98.2 °F (36.8 °C)   Resp 20   Wt 152 lb 5.4 oz (69.1 kg)   LMP 2021   SpO2 94%   BMI 29.75 kg/m²     General appearance: No apparent distress. Respiratory: Decreased breath sounds  Cardiovascular: Regular rate rhythm, normal S1-S2  Abdomen: Soft, nontender, nondistended  Musculoskeletal: No bilateral lower extremity edema. Neurologic: decreased strength, no new deficits. ASSESSMENT AND PLAN:    Patient, 25-year-old female with past medical history of COPD with chronic hypoxic respiratory failure on supplemental oxygen via nasal cannula between 1 to 2 L/min, GERD, was admitted on 2021 for evaluation of acute onset of shortness of breath. Patient was intubated in the emergency room and required pressor support with Levophed. Levophed was successfully weaned off.       Acute on chronic hypoxic/hypercapnic respiratory failure secondary to acute COPD exacerbation  -Critical care consulted-apprec crecs  -pulmonary-apprec recs  -Continue Solu-Medrol/DuoNeb  - currently on NC 8L    Bilateral lung opacities  -Likely developing pneumonia as per CTA chest  -No evidence of acute PE  -Continue ceftriaxone/doxycycline  - urine Legionella/streptococcal antigen-presumptive neg    Septic 3.6 3.9  3.9     --  99  99 104   CO2 28  --  29  29 31*   BUN 13  --  16  16 18   CREATININE 0.6  --  0.5  0.5 0.5   CALCIUM 9.1  --  8.9  8.9 9.1   PHOS  --   --  4.1  --     < > = values in this interval not displayed. Recent Labs     11/29/21  0555 11/30/21  0536 12/01/21  0400   PROT 6.4 5.9* 6.1*   ALKPHOS 99 81 81   ALT 50* 35* 45*   AST 27 14 32*   BILITOT <0.2 <0.2 0.2       No results for input(s): INR in the last 72 hours. No results for input(s): [place] in the last 72 hours. Chronic labs:    Lab Results   Component Value Date    CHOL 150 11/28/2021    TRIG 164 (H) 11/30/2021    HDL 59 11/28/2021    LDLCALC 20 11/28/2021    TSH 0.402 11/28/2021    INR 1.1 04/16/2013    LABA1C 6.2 (H) 11/28/2021       Radiology: REVIEWED DAILY    +++++++++++++++++++++++++++++++++++++++++++++++++  Audie Fernandez MD  [facility] Physician - Hospitalist  [address] [place] [address]  +++++++++++++++++++++++++++++++++++++++++++++++++  NOTE: This report was transcribed using voice recognition software. Every effort was made to ensure accuracy; however, inadvertent computerized transcription errors may be present.

## 2021-12-01 NOTE — PROGRESS NOTES
Pulmonary Progress Note    Admit Date: 11/27/2021    Requesting Physician: Judith Barron DO    Subjective: Extubated last evening to 3L n/c. Transferred to floor this morning. Feeling much better and remains on 3L, pox recorded as > 95%. Still with persistent cough, bringing up intermittent sputum. Prior admissions:  12/27/2019 admitted for COPD exacerbation due to RSV tracheobronchitis  5/4/2019 presented to ER treated for COPD exacerbation. Was given prednisone and azithromycin. 12/26/2019 seen in ER for COPD exacerbation was given prednisone  3/25/2021 was having knee pain after cleaning on the floor. 7/12/2021 underwent right knee replacement in the physical therapy afterwards         Medications:     sodium chloride      dextrose      fentaNYL 5 mcg/ml in 0.9%  ml infusion Stopped (11/60/33 0977)      folic acid  1 mg Oral Daily    sodium chloride flush  5-40 mL IntraVENous 2 times per day    enoxaparin  40 mg SubCUTAneous Daily    pantoprazole  40 mg IntraVENous Daily    chlorhexidine  15 mL Mouth/Throat BID    insulin lispro  0-6 Units SubCUTAneous Q4H    methylPREDNISolone  40 mg IntraVENous Q6H    cefepime  2,000 mg IntraVENous Q8H    ipratropium-albuterol  1 ampule Inhalation Q4H While awake    budesonide  0.5 mg Nebulization BID    Arformoterol Tartrate  15 mcg Nebulization BID    doxycycline (VIBRAMYCIN) IV  100 mg IntraVENous Q12H    aspirin  81 mg Oral Daily       Allergies: Allergies   Allergen Reactions    Codeine Hives    Sulfa Antibiotics Anaphylaxis and Hives    Demerol Hcl [Meperidine] Nausea And Vomiting       Social History:  30-pack-year smoker was smoking a pack and a half a day. She has 2 dogs. No birds  Works as a  made.    2 kids          Physical Examination    Vitals:  VITALS:  BP (!) 167/87   Pulse 97   Temp 98.2 °F (36.8 °C)   Resp 20   Wt 152 lb 5.4 oz (69.1 kg)   LMP 05/07/2021   SpO2 94%   BMI 29.75 kg/m²   24HR INTAKE/OUTPUT:      Intake/Output Summary (Last 24 hours) at 12/1/2021 0900  Last data filed at 12/1/2021 0600  Gross per 24 hour   Intake 1337.62 ml   Output 1005 ml   Net 332.62 ml         General: No distress. Alert. ENT:  Pharynx clear. Neck: Trachea midline. Resp: No accessory muscle use. Bilateral rhochi at bases, no wheezing  CV: Regular rate. Regular rhythm. No mumur or rub. ABD: Non-tender. Non-distended. Normal bowel sounds. Skin: Warm and dry. No rash on exposed extremities. Ext:  No edema, no rash  Neuro: Awake. Follows commands. No focal deficits. Lab Results:    CBC:   Recent Labs     11/29/21 0555 11/30/21 0536 12/01/21  0400   WBC 16.2* 11.7* 12.8*   HGB 11.2* 10.3* 10.7*   HCT 34.1 31.0* 32.6*   .0* 104.0* 101.9*    281 324     BMP:   Recent Labs     11/29/21 0555 11/29/21 0555 11/30/21 0536 12/01/21  0400     --  138  139 143   K 3.5   < > 3.6  3.6 3.9  3.9     --  99  99 104   CO2 28  --  29  29 31*   PHOS  --   --  4.1  --    BUN 13  --  16  16 18   CREATININE 0.6  --  0.5  0.5 0.5    < > = values in this interval not displayed. ALB:3,BILIDIR:3,BILITOT:3,ALKPHOS:3)@  PT/INR: No results for input(s): PROTIME, INR in the last 72 hours. Cultures:  -[unfilled]     Films:  CXR 11/27 atelectasis at bases  CT 11/28 shows no PE with mild b/basilar opacities  11/30 CXR nad    Assessment/Plan: 45 y. o.female with chronic hypoxic respiratory failure on 2 L, vaccinated for Covid, with COPD/asthma who presented with shortness of breath x1 week and hypoxemia, unable to keep oxygen up in ER, severe bronchospasm and required intubation. · Viral panel negative;  Covid negative  · Patient did not tolerate BiPAP required intubation  · Was started on pressors - now off 12/1  · 11/27/2021 atelectatic changes at the bases  · 11/28 CTA no PE  · Legionella strep pneumonia antigen negative  11/28 off of Levophed  11/30 on fentanyl propofol AC/20/380/40% FiO2 chest x-ray negative      1. Acute on chronic hypoxic and hypercapnic respiratory failure on mechanical ventilation  2. Pneumonia - PCT 0.38  3. Elevated troponins felt to be due to demand  4. COPD on Trelegy, 1L O2 at HS  5. Community-acquired pneumonia  6. Hypokalemia  7. History of psoriatic arthritis for which she was using steroids on and off  8. Elevated transaminases on presentation, improved  9. Status post right knee surgery 7/21      On IV steroids due to severe bronchospasm, requiring intubation  Off pressors  Extubated 11/30/21 to 3L. Continue smoking cessation councelling  Continue cefepime and doxycycline for now    Will wean steroids to q 8 as still with rhonchi, no wheezing. Add IS to aid pulmonary hygiene and help clear secretions. Continue ICS/LABA by aerosol for now. Wean O2 for pox > 90%.        Electronically signed by YANI Holbrook CNP on 12/1/2021 at 9:00 AM

## 2021-12-01 NOTE — PROGRESS NOTES
OT SESSION ATTEMPT     Date:2021  Patient Name: Michelle Velarde  MRN: [id number]  : 1970  Room: [address] [place] [address]     Occupational therapy orders received/chart review completed and OT session attempted this date. Pt on hold at this time d/t receiving PICC line recently. Will reattempt OT eval at a later time/date.      Cale Calabrese, [address] OTR/L #[id number]

## 2021-12-01 NOTE — CARE COORDINATION
Discharge plan is home with family when medically stable. Pt has [facility] in the past. Will follow.  Elly Keys RN

## 2021-12-01 NOTE — CONSULTS
Medical Intensive Care Unit     [facility]  Resident Consult Note    Date and time: 11/30/2021 7:09 PM  Patient's name:  Godfrey Ramirez Record Number: [id number]  Patient's account/billing number: [de-identified]  Patient's YOB: 1970  Age: 45 y.o. Date of Admission: 11/27/2021 10:31 PM  Length of stay during current admission: 2    Primary Care Physician: Gladys Harrell DO  ICU Attending Physician: Dr. Adama Torrez    Code Status: Full Code    Reason for ICU admission: SOB    History of Present Illness:   Patient is a 45 y.o. female with PMHx of Asthma, COPD on home O2, IBS, fibrocystic breast changes, psoriasis, anxiety and herpes zoster opthalmicus who presented with SOB. Patient has had worsening SOB for 1 week. She presented to ED and was hypoxic at 79% on 1 L O2 and breathing with accessory muscles. Patient recently finished a course of azithromycin and steroids. Patient was given a total of 5 breathing treatments per EMS. She denied fever, chills and cough. Patient has been vaccinated for COVID. Patient was intubated for acute respiratory failure. In the ED, patient was tacycardic, tachypneic, hypoxic but afebrile and normotensive. Labs were significant for leukocytosis at 23.4, hypokalemic at 3.4, hypomagnesemia at 1.7, hyperglycemia at 284, elevated troponin at 56 then 129. CXR appear to show bilateral costophrenic blunting, b/l perihilar densities and cephalization b/l. ABG [id number]/167.8/27.8 after intubation. EKG showed sinus tachycardia.     ED Meds: brovana, pulmicort, duoneb, ceftriaxone, vibramycin, lovenox, ativan, prednisone, versed   IVF: NS at 100 ml/hr, fentanyl, propofol       CURRENT VENTILATION STATUS:   [x] Ventilator  [] BIPAP  [] Nasal Cannula [] Room Air      SECRETIONS   Amount:  [] Small [] Moderate  [] Large [] None    Color:     [] White [] Colored  [] Bloody    SEDATION:  RAAS Score:  [] Propofol gtt  [] Versed gtt  [x] Fentanyl gtt  [] No Sedation    PARALYZED:  [x] No    [] Yes    VASOPRESSORS:  [x] No    [] Yes    If yes -   [] Levophed       [] Dopamine     [] Vasopressin       [] Dobutamine  [] Phenylephrine         [] Epinephrine    CENTRAL LINES:     [] No   [] Yes   (Date of Insertion: 21   )           If yes -     [] Right IJ     [] Left IJ [x] Right Femoral [] Left Femoral                   [] Right Subclavian [] Left Subclavian     PATEL'S CATHETER:   [] No   [x] Yes  (Date of Insertion:21   )     URINE OUTPUT:            [x] Good   [] Low              [] Anuric    REVIEW OF SYSTEMS:    · Cannot be assessed as patient was sedated and intubated    OBJECTIVE:     VITAL SIGNS:  /84   Pulse 110   Temp 98 °F (36.7 °C) (Temporal)   Resp 25   Wt 152 lb 5.4 oz (69.1 kg)   LMP 2021   SpO2 93%   BMI 29.75 kg/m²   Tmax over 24 hours:  Temp (24hrs), Av °F (36.1 °C), Min:96.6 °F (35.9 °C), Max:98 °F (36.7 °C)      Patient Vitals for the past 6 hrs:   BP Temp Temp src Pulse Resp SpO2   21 1900 -- -- -- 110 25 --   21 1800 139/84 98 °F (36.7 °C) Temporal 119 20 93 %   21 1733 -- -- -- 122 26 98 %   21 1700 134/75 -- -- 104 18 96 %   21 1600 (!) 140/73 97 °F (36.1 °C) Temporal 108 15 100 %   21 1534 -- -- -- -- 16 95 %   21 1533 -- -- -- 96 13 97 %   21 1500 126/71 -- -- 93 22 96 %   21 1400 131/72 96.7 °F (35.9 °C) Temporal 96 16 93 %         Intake/Output Summary (Last 24 hours) at 2021 1909  Last data filed at 2021 1800  Gross per 24 hour   Intake 3486.22 ml   Output 1410 ml   Net 2076.22 ml     Wt Readings from Last 2 Encounters:   21 152 lb 5.4 oz (69.1 kg)   21 149 lb 4.8 oz (67.7 kg)     Body mass index is 29.75 kg/m².       PHYSICAL EXAMINATION:  General appearance - sedated and intubated  Mental status - sedated  Eyes - pupils equal and reactive  Neck - supple, no significant adenopathy  Chest - course breath sounds b/l, no wheezes, rales or rhonchi, symmetric air entry  Heart - tachycardic, regular rhythm, normal S1, S2, no murmurs, rubs, clicks or gallops  Abdomen - soft, nondistended, no masses or organomegaly  Neurological - sedated  Extremities - peripheral pulses normal, no pedal edema, no clubbing or cyanosis  Skin - normal coloration and turgor, no rashes, no suspicious skin lesions noted      Any additional physical findings:    MEDICATIONS:  Scheduled Meds:   sodium chloride flush  5-40 mL IntraVENous 2 times per day    enoxaparin  40 mg SubCUTAneous Daily    pantoprazole  40 mg IntraVENous Daily    chlorhexidine  15 mL Mouth/Throat BID    insulin lispro  0-6 Units SubCUTAneous Q4H    methylPREDNISolone  40 mg IntraVENous Q6H    cefepime  2,000 mg IntraVENous Q8H    ipratropium-albuterol  1 ampule Inhalation Q4H While awake    budesonide  0.5 mg Nebulization BID    Arformoterol Tartrate  15 mcg Nebulization BID    doxycycline (VIBRAMYCIN) IV  100 mg IntraVENous Q12H    aspirin  81 mg Oral Daily     Continuous Infusions:   sodium chloride      dextrose      fentaNYL 5 mcg/ml in 0.9%  ml infusion Stopped (11/30/21 3408)     PRN Meds:   glucose, 15 g, PRN  dextrose, 12.5 g, PRN  midazolam, 2 mg, Q2H PRN  sodium chloride flush, 5-40 mL, PRN  sodium chloride, 25 mL, PRN  ondansetron, 4 mg, Q8H PRN  acetaminophen, 650 mg, Q6H PRN   Or  acetaminophen, 650 mg, Q6H PRN  ondansetron, 4 mg, Q6H PRN  glucagon (rDNA), 1 mg, PRN  dextrose, 100 mL/hr, PRN  polyethylene glycol, 17 g, Daily PRN  potassium chloride, 20 mEq, PRN  magnesium sulfate, 1,000 mg, PRN  perflutren lipid microspheres, 1.5 mL, ONCE PRN        VENT SETTINGS (Comprehensive) (if applicable):  Vent Information  $Ventilation: (S) Off Vent  Skin Assessment: Clean, dry, & intact  Equipment ID: [id number]  Equipment Changed: Humidification  Vent Type: [id number]  Vent Mode: PS  Vt Ordered: 0 mL  Rate Set: 0 bmp  Peak Flow: 0 L/min  Pressure Support: 10 cmH20  FiO2 : 40 %  SpO2: 93 %  SpO2/FiO2 ratio: 240  Sensitivity: 1  PEEP/CPAP: 5  I Time/ I Time %: 0 s  Humidification Source: Heated wire  Humidification Temp: 37  Humidification Temp Measured: 37  Circuit Condensation: Drained  Additional Respiratory  Assessments  Pulse: 110  Resp: 25  SpO2: 93 %  End Tidal CO2: 31 (%)  Position: Semi-Clark's  Humidification Source: Heated wire  Humidification Temp: 37  Circuit Condensation: Drained  Oral Care: Mouth swabbed, Mouth moisturizer, Mouth suctioned  Subglottic Suction Done?: No  Airway Type: ET  Airway Size: 7.5  Cuff Pressure (cm H2O): 29 cm H2O    ABGs:   Recent Labs     11/30/21  0533   PH 7.433   PCO2 46.7*   PO2 61.8*   HCO3 30.5*   BE 5.5*   O2SAT 89.7*       Laboratory findings:  Complete Blood Count:   Recent Labs     11/28/21  0806 11/29/21  0555 11/30/21  0536   WBC 16.3* 16.2* 11.7*   HGB 11.6 11.2* 10.3*   HCT 35.4 34.1 31.0*    265 281        Last 3 Blood Glucose:   Recent Labs     11/28/21  0806 11/29/21  0555 11/30/21  0536   GLUCOSE 191*  188* 209* 174*  176*        PT/INR:    Lab Results   Component Value Date    PROTIME 11.8 04/16/2013    INR 1.1 04/16/2013     PTT:    Lab Results   Component Value Date    APTT 32.3 04/16/2013       Comprehensive Metabolic Profile:   Recent Labs     11/28/21  0806 11/28/21  0806 11/29/21  0555 11/29/21  0555 11/30/21  0536     141  --  142  --  138  139   K 3.8  3.8  --  3.5  --  3.6  3.6     101  --  100  --  99  99   CO2 27  26  --  28  --  29  29   BUN 10  9  --  13  --  16  16   CREATININE 0.7  0.7  --  0.6  --  0.5  0.5   GLUCOSE 191*  188*  --  209*  --  174*  176*   CALCIUM 8.7  8.4*  --  9.1  --  8.9  8.9   PROT 6.1*  6.0*   < > 6.4   < > 5.9*   LABALBU 3.2*  3.4*   < > 3.2*   < > 3.1*   BILITOT 0.2  <0.2   < > <0.2   < > <0.2   ALKPHOS 125*  121*   < > 99   < > 81   *  104*   < > 27   < > 14   ALT 75*  76*   < > 50*   < > 35*    < > = values in this interval not displayed. Magnesium:   Lab Results   Component Value Date    MG 2.0 11/30/2021     Phosphorus:   Lab Results   Component Value Date    PHOS 4.1 11/30/2021     Ionized Calcium: No results found for: CAION   Troponin: No results for input(s): TROPONINI in the last 72 hours. Radiology/Imaging:   XR CHEST PORTABLE   Final Result   No acute findings or focal consolidation. The subtle opacities seen on CT   are not visible radiographically. CTA CHEST W CONTRAST   Final Result   Exam somewhat limited by patient motion. Allowing for this, no identified   pulmonary embolism. Minimal opacities in the lungs favored to represent developing infectious or   inflammatory process. Mild edema less likely. Short-term follow-up if   symptoms persist.         XR CHEST PORTABLE   Final Result   Interval intubation and placement of nasogastric tube. No other significant   change. Continued follow-up recommended. XR ABDOMEN FOR NG/OG/NE TUBE PLACEMENT   Final Result   Limited examination with nasogastric tube identified in the expected location   of the proximal to mid stomach. XR CHEST PORTABLE   Final Result   Atelectatic changes in the lung bases bilaterally. No other radiographic   evidence of acute cardiopulmonary disease.          XR CHEST PORTABLE    (Results Pending)         ASSESSMENT  And PLAN:     Cardiovascular  Elevated troponin 2/2 Type II MI in setting of AHRF 2/2 COPD/ Asthma exacerbation  Troponin 56 --> 129 --> 72  EKG: sinus tachycardia  Monitor VS, telemetry  Trend troponin    Pulmonary  Acute hypoxic respiratory failure 2/2 COPD/ Asthma exacerbation  WBC 23.4 --> 11.7, procal 0.35, lactic 2.1 --> 1.5   CXR appear to show bilateral costophrenic blunting, b/l perihilar densities and cephalization b/l  Blood culture negative x 2, legionella and strep Ag neg, respi panel neg  Drips: versed, fentanyl   Daily CXR and ABG  Ordered respiratory culture  Continue cefepime day 1 and vibramycin day 3, solumedrol 40 mg Q6, breathing treatments    Hx of COPD/ Asthma   Same as above    Tobacco use  disorder     20 pack year history  Nicotine patch prn    Hematology  Macrocytic anemia  Hg 16.2 --> 10.3  Folate low, vit b12 high  Give folate supplement  Monitor CBC    Endocrine  PreDM  HbA1C 6.2  On steroids  Check BS achs, LDSS        WEAN PER PROTOCOL:  [] No   [x] Yes  [] N/A    ICU PROPHYLAXIS:  Stress ulcer:  [x] PPI Agent  [] [id number] [] Sucralfate  [] Other:  VTE:   [x] Enoxaparin  [] Unfract. Heparin Subcut  [] EPC Cuffs    NUTRITION:  [] NPO [] Tube Feeding (Specify: ) [] TPN  [x] PO (Diet: Diet NPO)    INSULIN DRIP:   [x] No   [] Yes    CONSULTATION NEEDED:   [x] No   [x] Yes    FAMILY UPDATED:    [] No   [] Yes    TRANSFER OUT OF ICU:   [x] No   [] Yes    Karen Dasilva MD, PGY-1  Attending Physician: Dr. Cassandra Holland personally saw, examined and provided care for the patient. Radiographs, labs and medication list were reviewed by me independently. I spoke with bedside nursing, therapists and consultants. Critical care services and times documented are independent of procedures and multidisciplinary rounds with Residents. Additionally comprehensive, multidisciplinary rounds were conducted with the MICU team. The case was discussed in detail and plans for care were established. Review of Residents documentation was conducted and revisions were made as appropriate. I agree with the above documented exam, problem list and plan of care. Care reviewed with nursing staff, medical and surgical specialty care, primary care and the patient's family as available. Chart review/lab review/X-ray viewing/documentation and had long Conversation with patient/family re: prognosis, care options and any end of life issues:      Critical care time spent reviewing labs/films, examining patient, collaborating with other physicians more than 35  Minutes  excluding procedures . Naida Garcia M.D.

## 2021-12-02 LAB
ALBUMIN SERPL-MCNC: 3.2 G/DL (ref 3.5–5.2)
ALP BLD-CCNC: 73 U/L (ref 35–104)
ALT SERPL-CCNC: 44 U/L (ref 0–32)
ANION GAP SERPL CALCULATED.3IONS-SCNC: 8 MMOL/L (ref 7–16)
AST SERPL-CCNC: 30 U/L (ref 0–31)
BASOPHILS ABSOLUTE: 0.01 E9/L (ref 0–0.2)
BASOPHILS RELATIVE PERCENT: 0.1 % (ref 0–2)
BILIRUB SERPL-MCNC: 0.3 MG/DL (ref 0–1.2)
BUN BLDV-MCNC: 22 MG/DL (ref 6–20)
CALCIUM SERPL-MCNC: 8.8 MG/DL (ref 8.6–10.2)
CHLORIDE BLD-SCNC: 105 MMOL/L (ref 98–107)
CO2: 33 MMOL/L (ref 22–29)
CREAT SERPL-MCNC: 0.4 MG/DL (ref 0.5–1)
EOSINOPHILS ABSOLUTE: 0 E9/L (ref 0.05–0.5)
EOSINOPHILS RELATIVE PERCENT: 0 % (ref 0–6)
GFR AFRICAN AMERICAN: >60
GFR NON-AFRICAN AMERICAN: >60 ML/MIN/1.73
GLUCOSE BLD-MCNC: 121 MG/DL (ref 74–99)
HCT VFR BLD CALC: 34.7 % (ref 34–48)
HEMOGLOBIN: 11.1 G/DL (ref 11.5–15.5)
IMMATURE GRANULOCYTES #: 0.13 E9/L
IMMATURE GRANULOCYTES %: 1.1 % (ref 0–5)
LYMPHOCYTES ABSOLUTE: 1.56 E9/L (ref 1.5–4)
LYMPHOCYTES RELATIVE PERCENT: 13.3 % (ref 20–42)
MCH RBC QN AUTO: 33.7 PG (ref 26–35)
MCHC RBC AUTO-ENTMCNC: 32 % (ref 32–34.5)
MCV RBC AUTO: 105.5 FL (ref 80–99.9)
METER GLUCOSE: 132 MG/DL (ref 74–99)
METER GLUCOSE: 148 MG/DL (ref 74–99)
METER GLUCOSE: 177 MG/DL (ref 74–99)
METER GLUCOSE: 222 MG/DL (ref 74–99)
MONOCYTES ABSOLUTE: 0.59 E9/L (ref 0.1–0.95)
MONOCYTES RELATIVE PERCENT: 5 % (ref 2–12)
NEUTROPHILS ABSOLUTE: 9.46 E9/L (ref 1.8–7.3)
NEUTROPHILS RELATIVE PERCENT: 80.5 % (ref 43–80)
PDW BLD-RTO: 12.6 FL (ref 11.5–15)
PLATELET # BLD: 330 E9/L (ref 130–450)
PMV BLD AUTO: 9.3 FL (ref 7–12)
POTASSIUM SERPL-SCNC: 3.6 MMOL/L (ref 3.5–5)
RBC # BLD: 3.29 E12/L (ref 3.5–5.5)
SODIUM BLD-SCNC: 146 MMOL/L (ref 132–146)
TOTAL PROTEIN: 5.7 G/DL (ref 6.4–8.3)
WBC # BLD: 11.8 E9/L (ref 4.5–11.5)

## 2021-12-02 PROCEDURE — 6370000000 HC RX 637 (ALT 250 FOR IP): Performed by: INTERNAL MEDICINE

## 2021-12-02 PROCEDURE — 6360000002 HC RX W HCPCS: Performed by: STUDENT IN AN ORGANIZED HEALTH CARE EDUCATION/TRAINING PROGRAM

## 2021-12-02 PROCEDURE — 2580000003 HC RX 258: Performed by: INTERNAL MEDICINE

## 2021-12-02 PROCEDURE — 80053 COMPREHEN METABOLIC PANEL: CPT

## 2021-12-02 PROCEDURE — 94640 AIRWAY INHALATION TREATMENT: CPT

## 2021-12-02 PROCEDURE — 36415 COLL VENOUS BLD VENIPUNCTURE: CPT

## 2021-12-02 PROCEDURE — 6360000002 HC RX W HCPCS: Performed by: INTERNAL MEDICINE

## 2021-12-02 PROCEDURE — 97535 SELF CARE MNGMENT TRAINING: CPT

## 2021-12-02 PROCEDURE — C9113 INJ PANTOPRAZOLE SODIUM, VIA: HCPCS | Performed by: STUDENT IN AN ORGANIZED HEALTH CARE EDUCATION/TRAINING PROGRAM

## 2021-12-02 PROCEDURE — 85025 COMPLETE CBC W/AUTO DIFF WBC: CPT

## 2021-12-02 PROCEDURE — 97530 THERAPEUTIC ACTIVITIES: CPT

## 2021-12-02 PROCEDURE — 2580000003 HC RX 258: Performed by: STUDENT IN AN ORGANIZED HEALTH CARE EDUCATION/TRAINING PROGRAM

## 2021-12-02 PROCEDURE — 2700000000 HC OXYGEN THERAPY PER DAY

## 2021-12-02 PROCEDURE — 6370000000 HC RX 637 (ALT 250 FOR IP): Performed by: NURSE PRACTITIONER

## 2021-12-02 PROCEDURE — 1200000000 HC SEMI PRIVATE

## 2021-12-02 PROCEDURE — 6370000000 HC RX 637 (ALT 250 FOR IP): Performed by: STUDENT IN AN ORGANIZED HEALTH CARE EDUCATION/TRAINING PROGRAM

## 2021-12-02 PROCEDURE — 2500000003 HC RX 250 WO HCPCS: Performed by: INTERNAL MEDICINE

## 2021-12-02 PROCEDURE — 82962 GLUCOSE BLOOD TEST: CPT

## 2021-12-02 PROCEDURE — 6360000002 HC RX W HCPCS: Performed by: NURSE PRACTITIONER

## 2021-12-02 PROCEDURE — 36592 COLLECT BLOOD FROM PICC: CPT

## 2021-12-02 PROCEDURE — 97165 OT EVAL LOW COMPLEX 30 MIN: CPT

## 2021-12-02 PROCEDURE — 97161 PT EVAL LOW COMPLEX 20 MIN: CPT

## 2021-12-02 RX ORDER — PREDNISONE 20 MG/1
40 TABLET ORAL DAILY
Status: DISCONTINUED | OUTPATIENT
Start: 2021-12-03 | End: 2021-12-03 | Stop reason: HOSPADM

## 2021-12-02 RX ORDER — METHYLPREDNISOLONE SODIUM SUCCINATE 40 MG/ML
40 INJECTION, POWDER, LYOPHILIZED, FOR SOLUTION INTRAMUSCULAR; INTRAVENOUS EVERY 12 HOURS
Status: COMPLETED | OUTPATIENT
Start: 2021-12-02 | End: 2021-12-02

## 2021-12-02 RX ADMIN — CEFEPIME HYDROCHLORIDE 2000 MG: 2 INJECTION, POWDER, FOR SOLUTION INTRAVENOUS at 11:33

## 2021-12-02 RX ADMIN — Medication 10 ML: at 00:10

## 2021-12-02 RX ADMIN — ASPIRIN 81 MG CHEWABLE TABLET 81 MG: 81 TABLET CHEWABLE at 11:26

## 2021-12-02 RX ADMIN — Medication 10 ML: at 11:33

## 2021-12-02 RX ADMIN — CEFEPIME HYDROCHLORIDE 2000 MG: 2 INJECTION, POWDER, FOR SOLUTION INTRAVENOUS at 23:13

## 2021-12-02 RX ADMIN — IPRATROPIUM BROMIDE AND ALBUTEROL SULFATE 1 AMPULE: .5; 2.5 SOLUTION RESPIRATORY (INHALATION) at 14:54

## 2021-12-02 RX ADMIN — METHYLPREDNISOLONE SODIUM SUCCINATE 40 MG: 40 INJECTION, POWDER, FOR SOLUTION INTRAMUSCULAR; INTRAVENOUS at 11:30

## 2021-12-02 RX ADMIN — ENOXAPARIN SODIUM 40 MG: 100 INJECTION SUBCUTANEOUS at 11:27

## 2021-12-02 RX ADMIN — HEPARIN 300 UNITS: 100 SYRINGE at 22:59

## 2021-12-02 RX ADMIN — ARFORMOTEROL TARTRATE 15 MCG: 15 SOLUTION RESPIRATORY (INHALATION) at 09:59

## 2021-12-02 RX ADMIN — DOXYCYCLINE 100 MG: 100 INJECTION, POWDER, LYOPHILIZED, FOR SOLUTION INTRAVENOUS at 20:30

## 2021-12-02 RX ADMIN — METHYLPREDNISOLONE SODIUM SUCCINATE 40 MG: 40 INJECTION, POWDER, FOR SOLUTION INTRAMUSCULAR; INTRAVENOUS at 04:47

## 2021-12-02 RX ADMIN — BUDESONIDE 500 MCG: 0.5 INHALANT RESPIRATORY (INHALATION) at 09:59

## 2021-12-02 RX ADMIN — Medication 10 ML: at 23:12

## 2021-12-02 RX ADMIN — FOLIC ACID 1 MG: 1 TABLET ORAL at 11:30

## 2021-12-02 RX ADMIN — HEPARIN 300 UNITS: 100 SYRINGE at 00:10

## 2021-12-02 RX ADMIN — IPRATROPIUM BROMIDE AND ALBUTEROL SULFATE 1 AMPULE: .5; 2.5 SOLUTION RESPIRATORY (INHALATION) at 21:24

## 2021-12-02 RX ADMIN — METHYLPREDNISOLONE SODIUM SUCCINATE 40 MG: 40 INJECTION, POWDER, FOR SOLUTION INTRAMUSCULAR; INTRAVENOUS at 23:09

## 2021-12-02 RX ADMIN — CEFEPIME HYDROCHLORIDE 2000 MG: 2 INJECTION, POWDER, FOR SOLUTION INTRAVENOUS at 15:28

## 2021-12-02 RX ADMIN — IPRATROPIUM BROMIDE AND ALBUTEROL SULFATE 1 AMPULE: .5; 2.5 SOLUTION RESPIRATORY (INHALATION) at 16:49

## 2021-12-02 RX ADMIN — DOXYCYCLINE 100 MG: 100 INJECTION, POWDER, LYOPHILIZED, FOR SOLUTION INTRAVENOUS at 11:26

## 2021-12-02 RX ADMIN — BUDESONIDE 500 MCG: 0.5 INHALANT RESPIRATORY (INHALATION) at 21:24

## 2021-12-02 RX ADMIN — 0.12% CHLORHEXIDINE GLUCONATE 15 ML: 1.2 RINSE ORAL at 20:28

## 2021-12-02 RX ADMIN — SODIUM CHLORIDE, PRESERVATIVE FREE 30 ML: 5 INJECTION INTRAVENOUS at 04:46

## 2021-12-02 RX ADMIN — ARFORMOTEROL TARTRATE 15 MCG: 15 SOLUTION RESPIRATORY (INHALATION) at 21:24

## 2021-12-02 RX ADMIN — PANTOPRAZOLE SODIUM 40 MG: 40 INJECTION, POWDER, FOR SOLUTION INTRAVENOUS at 11:28

## 2021-12-02 RX ADMIN — CEFEPIME HYDROCHLORIDE 2000 MG: 2 INJECTION, POWDER, FOR SOLUTION INTRAVENOUS at 00:08

## 2021-12-02 RX ADMIN — IPRATROPIUM BROMIDE AND ALBUTEROL SULFATE 1 AMPULE: .5; 2.5 SOLUTION RESPIRATORY (INHALATION) at 09:59

## 2021-12-02 RX ADMIN — INSULIN LISPRO 1 UNITS: 100 INJECTION, SOLUTION INTRAVENOUS; SUBCUTANEOUS at 00:22

## 2021-12-02 RX ADMIN — INSULIN LISPRO 1 UNITS: 100 INJECTION, SOLUTION INTRAVENOUS; SUBCUTANEOUS at 05:21

## 2021-12-02 ASSESSMENT — PAIN SCALES - GENERAL
PAINLEVEL_OUTOF10: 2
PAINLEVEL_OUTOF10: 0

## 2021-12-02 NOTE — PLAN OF CARE
Problem: Skin Integrity:  Goal: Will show no infection signs and symptoms  Description: Will show no infection signs and symptoms  Outcome: Ongoing  Goal: Absence of new skin breakdown  Description: Absence of new skin breakdown  Outcome: Ongoing     Problem: Falls - Risk of:  Goal: Will remain free from falls  Description: Will remain free from falls  12/2/2021 0832 by Thony Adams RN  Outcome: Ongoing  12/2/2021 0405 by Rachel Monsalve RN  Outcome: Met This Shift  Goal: Absence of physical injury  Description: Absence of physical injury  12/2/2021 0832 by Thony Adams RN  Outcome: Ongoing  12/2/2021 0405 by Rachel Monsalve RN  Outcome: Met This Shift     Problem: Breathing Pattern - Ineffective:  Goal: Ability to achieve and maintain a regular respiratory rate will improve  Description: Ability to achieve and maintain a regular respiratory rate will improve  Outcome: Ongoing     Problem: Daily Care:  Goal: Daily care needs are met  Description: Daily care needs are met  12/2/2021 0832 by Thony Adams RN  Outcome: Ongoing  12/2/2021 0405 by Rachel Monsalve RN  Outcome: Met This Shift     Problem: OXYGENATION/RESPIRATORY FUNCTION  Goal: Patient will maintain patent airway  12/2/2021 0832 by Thony Adams RN  Outcome: Ongoing  12/2/2021 0405 by Rachel Monsalve RN  Outcome: Met This Shift  Goal: Patient will achieve/maintain normal respiratory rate/effort  Description: Respiratory rate and effort will be within normal limits for the patient  12/2/2021 9918 by Thony Adams RN  Outcome: Ongoing  12/2/2021 0405 by Rachel Monsalve RN  Outcome: Ongoing     Problem: Pain:  Goal: Pain level will decrease  Description: Pain level will decrease  Outcome: Ongoing  Goal: Control of acute pain  Description: Control of acute pain  Outcome: Ongoing  Goal: Control of chronic pain  Description: Control of chronic pain  Outcome: Ongoing

## 2021-12-02 NOTE — PLAN OF CARE
Problem: Falls - Risk of:  Goal: Will remain free from falls  Description: Will remain free from falls  Outcome: Met This Shift  Goal: Absence of physical injury  Description: Absence of physical injury  Outcome: Met This Shift     Problem: Daily Care:  Goal: Daily care needs are met  Description: Daily care needs are met  Outcome: Met This Shift     Problem: OXYGENATION/RESPIRATORY FUNCTION  Goal: Patient will maintain patent airway  Outcome: Met This Shift  Goal: Patient will achieve/maintain normal respiratory rate/effort  Description: Respiratory rate and effort will be within normal limits for the patient  Outcome: Ongoing

## 2021-12-02 NOTE — PROGRESS NOTES
Pulmonary Progress Note    Admit Date: 11/27/2021    Requesting Physician: Judith Barron DO    Subjective: feeling better. Less cough and dyspnea. Prior admissions:  12/27/2019 admitted for COPD exacerbation due to RSV tracheobronchitis  5/4/2019 presented to ER treated for COPD exacerbation. Was given prednisone and azithromycin. 12/26/2019 seen in ER for COPD exacerbation was given prednisone  3/25/2021 was having knee pain after cleaning on the floor. 7/12/2021 underwent right knee replacement in the physical therapy afterwards           Medications:     sodium chloride      dextrose      fentaNYL 5 mcg/ml in 0.9%  ml infusion Stopped (17/47/93 9658)      folic acid  1 mg Oral Daily    methylPREDNISolone  40 mg IntraVENous Q8H    sodium chloride flush  5-40 mL IntraVENous 2 times per day    heparin flush  3 mL IntraVENous 2 times per day    enoxaparin  40 mg SubCUTAneous Daily    pantoprazole  40 mg IntraVENous Daily    chlorhexidine  15 mL Mouth/Throat BID    cefepime  2,000 mg IntraVENous Q8H    ipratropium-albuterol  1 ampule Inhalation Q4H While awake    budesonide  0.5 mg Nebulization BID    Arformoterol Tartrate  15 mcg Nebulization BID    doxycycline (VIBRAMYCIN) IV  100 mg IntraVENous Q12H    aspirin  81 mg Oral Daily       Allergies: Allergies   Allergen Reactions    Codeine Hives    Sulfa Antibiotics Anaphylaxis and Hives    Demerol Hcl [Meperidine] Nausea And Vomiting       Social History:  30-pack-year smoker was smoking a pack and a half a day. She has 2 dogs. No birds  Works as a  made.    2 kids          Physical Examination    Vitals:  VITALS:  /78   Pulse 92   Temp 97.2 °F (36.2 °C) (Temporal)   Resp 16   Wt 152 lb 5.4 oz (69.1 kg)   LMP 05/07/2021   SpO2 97%   BMI 29.75 kg/m²   24HR INTAKE/OUTPUT:      Intake/Output Summary (Last 24 hours) at 12/2/2021 1552  Last data filed at 12/2/2021 1516  Gross per 24 hour   Intake 280 ml   Output --   Net 280 ml         General: No distress. Alert. ENT:  Pharynx clear. Neck: Trachea midline. Resp: No accessory muscle use. Bilateral rhochi at bases, no wheezing. Prolonged exp phase  CV: Regular rate. Regular rhythm. No mumur or rub. ABD: Non-tender. Non-distended. Normal bowel sounds. Skin: Warm and dry. No rash on exposed extremities. Ext:  No edema, no rash  Neuro: Awake. Follows commands. No focal deficits. Lab Results:    CBC:   Recent Labs     11/30/21  0536 12/01/21  0400 12/02/21  0445   WBC 11.7* 12.8* 11.8*   HGB 10.3* 10.7* 11.1*   HCT 31.0* 32.6* 34.7   .0* 101.9* 105.5*    324 330     BMP:   Recent Labs     11/30/21  0536 12/01/21  0400 12/02/21  0445     139 143 146   K 3.6  3.6 3.9  3.9 3.6   CL 99  99 104 105   CO2 29  29 31* 33*   PHOS 4.1  --   --    BUN 16  16 18 22*   CREATININE 0.5  0.5 0.5 0.4*      ALB:3,BILIDIR:3,BILITOT:3,ALKPHOS:3)@  PT/INR: No results for input(s): PROTIME, INR in the last 72 hours. Cultures:  -[unfilled]     Films:  CXR 11/27 atelectasis at bases  CT 11/28 shows no PE with mild b/basilar opacities  11/30 CXR nad    Assessment/Plan: 45 y. o.female with chronic hypoxic respiratory failure on 2 L, vaccinated for Covid, with COPD/asthma who presented with shortness of breath x1 week and hypoxemia, unable to keep oxygen up in ER, severe bronchospasm and required intubation. · Viral panel negative; Covid negative  · Patient did not tolerate BiPAP required intubation  · Was started on pressors - now off 12/1  · 11/27/2021 atelectatic changes at the bases  · 11/28 CTA no PE  · Legionella strep pneumonia antigen negative  11/28 off of Levophed  11/30 on fentanyl propofol AC/20/380/40% FiO2 chest x-ray negative      1. Acute on chronic hypoxic and hypercapnic respiratory failure on mechanical ventilation  2. Pneumonia - PCT 0.38  3. Elevated troponins felt to be due to demand  4.  COPD on Trelegy, 1L O2 at HS  5. Community-acquired pneumonia  6. Hypokalemia  7. History of psoriatic arthritis for which she was using steroids on and off  8. Elevated transaminases on presentation, improved  9. Status post right knee surgery 7/21      On IV steroids due to severe bronchospasm, requiring intubation  Extubated 11/30/21 to 3L. Continue smoking cessation councelling  Continue cefepime and doxycycline for now    Will wean steroids to q 12 , hope to change to PO in am. Taper down to 10 mg po daily ( baseline)    pulmonary hygiene and help clear secretions. Continue ICS/LABA by aerosol for now. Wean O2 for pox > 90%. Outpatient eval for Biologic, Dupixent? Hope to be d/c home soon.       Electronically signed by Pearl Narayanan MD on 12/2/2021 at 3:52 PM

## 2021-12-02 NOTE — PROGRESS NOTES
[address] [place] [address]       QSHP:                                                  Patient Name: Waylon Latif    MRN: [id number]    : 1970    Room: [address] [place] [address]      Evaluating OT: Rita Palacio OTR/L License #  [id number]       Referring Provider: Taylor Whitman MD    Specific Provider Orders/Date: OT evaluation & treatment       Diagnosis: Acute on chronic hypoxic/hypercapnic respiratory failure secondary to acute COPD exacerbation     Surgery:   Past Surgical History:   Procedure Laterality Date    CHOLECYSTECTOMY  2003    HERNIA REPAIR      umbilical    KNEE ARTHROSCOPY Right     LAPAROSCOPY      LEEP      LYMPH NODE BIOPSY      TOTAL KNEE ARTHROPLASTY Right 2021    RIGHT KNEE JOHNNIE ROBOTIC ASSISTED TOTAL ARTHROPLASTY  PNB performed by Soumya Graff MD at [facility] OR        Pertinent Medical History:  has a past medical history of Anxiety, Arthritis, Asthma, Chronic back pain, Chronic lower back pain, Chronic neck pain, COPD, COPD exacerbation ([address] Difficult intravenous access, Fibrocystic breast changes, GERD (gastroesophageal reflux disease), Herpes zoster ophthalmicus, Irritable bowel syndrome, Obesity, On supplemental oxygen therapy, Psoriasis, Right knee pain, RSV bronchitis, Tinea versicolor, Wears contact lenses, Wears dentures, and Wears glasses. Precautions:  Fall Risk, supplemental O2 at 4L currently, extubated 21.      Assessment of current deficits    [] Functional mobility  [x]ADLs  [x] Strength               [x]Cognition    [x] Functional transfers   [x] IADLs         [x] Safety Awareness   [x]Endurance    [x] Fine Coordination              [x] Balance      [] Vision/perception   [x]Sensation     []Gross Motor Coordination  [] ROM  [] Delirium                   [] Motor Control     OT PLAN OF CARE   OT POC based on good   Judgement/safety:  Fair/+     Functional Assessment:  AM-PAC Daily Activity Raw Score: 19/24   Initial Eval Status  Date: 12-2-21 Treatment Status  Date: STGs = LTGs  Time frame: 10-14 days   Feeding Ind. Grooming SBA in standing  Modified [place]    UB Dressing SBA  Modified [place]    LB Dressing SBA/Min A to evelyne socks seated EOB, figure 4 technique  Modified [place]    Bathing Min A with sim. task  Modified [place]    Toileting Min A  Modified [place]    Bed Mobility  Logroll: SBA/Sup  Supine to sit: SBA/Sup  Sit to supine: NT   Supine to sit: Modified [place]   Sit to supine: Modified [place]    Functional Transfers SBA with sit <> stand, SPT without A.D. Modified [place]    Functional Mobility SBA without A.D. ~household distances  Modified [place]    Balance Sitting:     Static: Ind. Dynamic: SBA/Sup  Standing: SBA     Activity Tolerance Fair- with lt. Ax. Pt. C/o min. dyspnea with lt. exertion, reviewed E.C. techniques as well as breathing techniques  Fair+ with mod. Ax. Visual/  Perceptual Glasses: yes        Vitals spO2 on 4L viz NC: 94-95%  HR WFL throughout  [facility]     Hand Dominance L   AROM (PROM) Strength Additional Info:    RUE  WFL 4/5 good  and wfl FMC/dexterity noted during ADL tasks     LUE WFL 4/5 good  and wfl FMC/dexterity noted during ADL tasks       Hearing: WFL   Sensation:  No c/o numbness or tingling B UE  Tone: WFL B UE  Edema: none noted B UE    Comments: Upon arrival patient supine, agreeable to OT, cleared by Nursing  Therapist facilitated bed mobility/ADLs/functional transfer/mobility training with focus on safety, technique, E.C., breathing tech. , & precautions. Pt. Instructed RE: safe transfers/mobility, ADLs, role of OT, treatment plan, recs. , prec.  present for instruction, both demonstrate good understanding.   At end of session, patient seated in chair, all needs met, RN notified, with call light and phone within reach, all lines and tubes intact. Overall patient demonstrated decreased strength, balance, independence & safety during completion of ADL/functional transfer/mobility tasks. Pt would benefit from continued skilled OT to increase safety and independence with completion of ADL/IADL tasks for functional independence and quality of life. Treatment: OT treatment provided this date includes:    Instruction/training on safety and adapted techniques for completion of ADLs: to increase [place] in self care    Instruction/training on safe functional mobility/transfer techniques: with focus on safety, technique & precautions    Instruction/training on energy conservation/work simplification for completion of ADLs: techniques to increase [place] with self care ADLs & iADLs, work simplification to improve endurance    Proper Positioning/Alignment: for optimal healing, skin integrity to prevent breakdown, decrease edema   Skilled monitoring of vitals: to include BP, spO2 & HR during session   Sitting/standing Balance/Tolerance- to increased balance & activity tolerance during ADLs as well as facilitate proper posture and/or positioning.  Therapeutic exercise- Instruction on B UE ROM exercises to improve strength/function for increased [place] with ADLs & iADLs    Rehab Potential: Good for established goals     Patient / Family Goal: to return home with family      Patient and/or family were instructed on functional diagnosis, prognosis/goals and OT plan of care. Demonstrated good understanding.      Eval Complexity: Low    Time In: 10:56  Time Out: 11:20  Total Treatment Time: 09    Min Units   OT Eval Low 69761  x     OT Eval Medium 03260      OT Eval High 74569      OT Re-Eval [id number]       Therapeutic Ex 38719       Therapeutic Activities 13056       ADL/Self Care 88221  09  1   Orthotic Management 34614       Manual 23018     Neuro Re-Ed 02851       Non-Billable Time          Evaluation Time additionally includes thorough review of current medical information, gathering information on past medical history/social history and prior level of function, interpretation of standardized testing/informal observation of tasks, assessment of data and development of plan of care and goals. Yuko Palacio, OTR/L   License #  [id number]

## 2021-12-02 NOTE — PROGRESS NOTES
Hospitalist Progress Note        SUBJECTIVE:    Patient seen and examined at bedside.  was present in the room as well and explained about the weaning and possible discharge based on how she does tomorrow. Physical/occupational therapy for strength. She does not have any questions or concerns at this time  Currently uses oxygen 1 to 2 L nasal cannula at home as needed  Records reviewed. Stable overnight. No other overnight issues reported. Temp (24hrs), Av.4 °F (36.3 °C), Min:97.1 °F (36.2 °C), Max:97.5 °F (36.4 °C)    DIET: ADULT DIET; Regular  CODE: Full Code    Intake/Output Summary (Last 24 hours) at 2021 1204  Last data filed at 2021 0446  Gross per 24 hour   Intake 240 ml   Output --   Net 240 ml       OBJECTIVE:    BP (!) 154/80   Pulse 88   Temp 97.5 °F (36.4 °C) (Temporal)   Resp 20   Wt 152 lb 5.4 oz (69.1 kg)   LMP 2021   SpO2 98%   BMI 29.75 kg/m²     General appearance: No apparent distress. Respiratory: No wheezing, no rales clear to auscultation bilaterally with mild decreased breath sounds  Cardiovascular: Regular rate rhythm, normal S1-S2  Abdomen: Soft, nontender, nondistended  Musculoskeletal: No bilateral lower extremity edema. Neurologic: decreased strength, no new deficits. ASSESSMENT AND PLAN:    Patient, 56-year-old female with past medical history of COPD with chronic hypoxic respiratory failure on supplemental oxygen via nasal cannula between 1 to 2 L/min, GERD, was admitted on 2021 for evaluation of acute onset of shortness of breath. Patient was intubated in the emergency room and required pressor support with Levophed. Levophed was successfully weaned off.   Patient was extubated 2021 and was weaned off oxygen slowly      Acute on chronic hypoxic/hypercapnic respiratory failure secondary to acute COPD exacerbation  -Critical care consulted-apprec crecs  -pulmonary-apprec recs  -Continue Solu-Medrol/DuoNeb  - currently on NC 4L    Bilateral lung opacities  -Likely developing pneumonia as per CTA chest  -No evidence of acute PE  -Continue ceftriaxone/doxycycline  - urine Legionella/streptococcal antigen-presumptive neg    Septic shock  -Likely secondary to above   - Blood c/s -24hrs  NG  -Currently off of pressor support  -Continue antibiotics as above    Elevated troponin  -Cardiology evaluation appreciated. Elevated troponin reflective of acute myocardial injury in the setting of COPD exacerbation. No clinical evidence of ischemia per EKG. -As per cardiology, continue aspirin/beta-blocker. No statin therapy given low LDL. No further work-up recommended from cardiology standpoint.     DVT prophylaxis  -Lovenox subcu      DISPOSITION: Pending clinical improvement- Weaning oxygen, patient has a walker at home and is to discharge to home when clinically stable    Medications:  REVIEWED DAILY    Infusion Medications    sodium chloride      dextrose      fentaNYL 5 mcg/ml in 0.9%  ml infusion Stopped (11/30/21 8182)     Scheduled Medications    folic acid  1 mg Oral Daily    methylPREDNISolone  40 mg IntraVENous Q8H    sodium chloride flush  5-40 mL IntraVENous 2 times per day    heparin flush  3 mL IntraVENous 2 times per day    enoxaparin  40 mg SubCUTAneous Daily    pantoprazole  40 mg IntraVENous Daily    chlorhexidine  15 mL Mouth/Throat BID    cefepime  2,000 mg IntraVENous Q8H    ipratropium-albuterol  1 ampule Inhalation Q4H While awake    budesonide  0.5 mg Nebulization BID    Arformoterol Tartrate  15 mcg Nebulization BID    doxycycline (VIBRAMYCIN) IV  100 mg IntraVENous Q12H    aspirin  81 mg Oral Daily     PRN Meds: sodium chloride flush, sodium chloride, heparin flush, glucose, dextrose, midazolam, ondansetron **OR** [DISCONTINUED] ondansetron, acetaminophen **OR** acetaminophen, ondansetron, glucagon (rDNA), dextrose, polyethylene glycol, potassium chloride, magnesium sulfate, perflutren lipid microspheres    Labs:     Recent Labs     11/30/21  0536 12/01/21  0400 12/02/21  0445   WBC 11.7* 12.8* 11.8*   HGB 10.3* 10.7* 11.1*   HCT 31.0* 32.6* 34.7    324 330       Recent Labs     11/30/21  0536 12/01/21  0400 12/02/21  0445     139 143 146   K 3.6  3.6 3.9  3.9 3.6   CL 99  99 104 105   CO2 29  29 31* 33*   BUN 16  16 18 22*   CREATININE 0.5  0.5 0.5 0.4*   CALCIUM 8.9  8.9 9.1 8.8   PHOS 4.1  --   --        Recent Labs     11/30/21  0536 12/01/21  0400 12/02/21  0445   PROT 5.9* 6.1* 5.7*   ALKPHOS 81 81 73   ALT 35* 45* 44*   AST 14 32* 30   BILITOT <0.2 0.2 0.3       No results for input(s): INR in the last 72 hours. No results for input(s): Sruthi Araiza in the last 72 hours. Chronic labs:    Lab Results   Component Value Date    CHOL 150 11/28/2021    TRIG 164 (H) 11/30/2021    HDL 59 11/28/2021    LDLCALC 20 11/28/2021    TSH 0.402 11/28/2021    INR 1.1 04/16/2013    LABA1C 6.2 (H) 11/28/2021       Radiology: REVIEWED DAILY    +++++++++++++++++++++++++++++++++++++++++++++++++  Geno Bishop MD  [employer] - Hospitalist  [address] [place] [address]  +++++++++++++++++++++++++++++++++++++++++++++++++  NOTE: This report was transcribed using voice recognition software. Every effort was made to ensure accuracy; however, inadvertent computerized transcription errors may be present.

## 2021-12-02 NOTE — PROGRESS NOTES
Physical Therapy Initial Evaluation    Name: Carlitos Quan  : 1970  MRN: [id number]    Date of Service: 2021    Evaluating PT:  Sudhakar Holden, PT [id number]    Room #:  5343/8178-X  Diagnosis:  Acute respiratory failure, unspecified whether with hypoxia or hypercapnia (formerly [facility]) [J96.00]  Acute respiratory failure with hypoxia and hypercapnia ([facility] [address] [J96.01, J96.02]  PMHx/PSHx:     has a past medical history of Anxiety, Arthritis, Asthma, Chronic back pain, Chronic lower back pain, Chronic neck pain, COPD, COPD exacerbation ([facility] [address] Difficult intravenous access, Fibrocystic breast changes, GERD (gastroesophageal reflux disease), Herpes zoster ophthalmicus, Irritable bowel syndrome, Obesity, On supplemental oxygen therapy, Psoriasis, Right knee pain, RSV bronchitis, Tinea versicolor, Wears contact lenses, Wears dentures, and Wears glasses. has a past surgical history that includes laparoscopy; Cholecystectomy (2003); LEEP; lymph node biopsy; Knee arthroscopy (Right); hernia repair (2003); and Total knee arthroplasty (Right, 2021). Procedure/Surgery:  Weaned from vent (21)  Precautions:  falls and O2 , FWB (full weight bearing)  Equipment Needs: N/a    SUBJECTIVE:  Patient lives with spouse  in a two story home resides first  with 3 steps to enter with 1 rail. Patient ambulated with wheeled walker post R knee surgery and independently before knee surgery PTA. Equipment owned: [employer]    OBJECTIVE:   Initial Evaluation  Date: 21 Treatment Short Term/ Long Term   Goals   AM-PAC 6 Clicks 78/36     Was pt agreeable to Eval/treatment? Yes     Does pt have pain? Yes     Bed Mobility  Rolling: SBA   Supine to sit:   SBA   Sit to supine: SBA   Scooting: SBA   Rolling: Ind  Supine to sit: Ind  Sit to supine: Ind  Scooting: Ind   Transfers Sit to stand: SBA   Stand to sit: SBA  Stand pivot: SBA  Sit to stand: Mod Ind   Stand to sit: Mod Ind    Stand pivot:  Mod Ind     Ambulation    40 feet with SBA and no AD  100 feet Ind   Stair negotiation: ascended and descended  NT  3 steps with Mod Ind  1 rail    ROM BUE:  Defer to OT eval  BLE:  wfl     Strength BUE: Defer to OT eval  RLE:  Grossly 3+/5  LLE:  Grossly  4/5  4+/5   Balance Sitting EOB:  Sup  Dynamic Standing:  Sup  Sitting EOB:  Ind  Dynamic Standing:  Ind     Patient is Alert & Oriented x person, place, time and situation and follows directions   Sensation:  Pt denies numbness and tingling to extremities  Edema:  Not assessed at this time    Vitals:  EOB During Amb   Heart Rate at rest 101 bpm Heart Rate at rest 112 bpm   SPO2 at rest 96% 4L NC SPO2 at rest 95% 4L NC     Therapeutic Exercises:  Functional activity included tolerance to the upright position at EOB, transfer training, ambulation training    Patient education  Pt educated regarding role of PT evaluation, need for OOB activity and use of call light for safety, spirometry use    Patient response to education:   Pt verbalized understanding Pt demonstrated skill Pt requires further education in this area   Yes Yes Yes; Reminders     ASSESSMENT:  Conditions Requiring Skilled Therapeutic Intervention:  [x]Decreased strength     [x]Decreased ROM  [x]Decreased functional mobility  [x]Decreased balance   [x]Decreased endurance   [x]Decreased posture  []Decreased sensation  []Decreased coordination   []Decreased vision  []Decreased safety awareness   []Increased pain       Comments:    Pt presented supine in bed and agreed to PT eval and treatment. She stated that she has been getting up and utilizing the bedside commode as needed. She also stated that she would use the BR however her oxygen line was not long enough to reach the BR. A long oxygen line was provided as to allow the pt the independence to use the BR with assistance from staff. During ambulation training pt displayed no LOB with dynamic standing and ambulated with a slow rajiv with a step through gait pattern.  During ambulation pt displayed an increase in RR with SOB and dyspnea. However, her vitals remained stable throughout session. Pt was returned to bed and positioned for comfort with call light and phone left within reach. The  was entering room as PT was exiting. This patient can benefit from the continuation of skilled PT intervention to maximize functional level and return to PLOF. Treatment:  Patient practiced and was instructed in the following treatment:    · Bed mobility training - pt given verbal and tactile cues to facilitate proper sequencing and safety during rolling and supine>sit as well as provided with physical assistance to complete task    · STS and transfer training - educated on hand/foot placement, safety, and sequencing during STS and pivot transfers using assistive device  · Gait training - verbal cues for [employer] approximation/negotiation, upright posture, and safety during 90 and 180 degree turns during gait   · Pt Education in Spirometry use, energy conservation techniques, bed mobility, ambulation training.    o Sitting EOB: Pt sat at EOB for 3+ minutes as sitting balance and upright tolerance were challenged. o Vitals and symptoms were monitored closely throughout session. o Transfers completed to bedside chair. Pt's/ family goals   1. Return Home    Prognosis is good  for reaching above PT goals. Patient and or family understand(s) diagnosis, prognosis, and plan of care.   yes    PHYSICAL THERAPY PLAN OF CARE:    PT POC is established based on physician order and patient diagnosis     Referring provider/PT Order:  PT Eval and Treat *  12/01/21 1215  PT eval and treat          Tomasz Muñoz MD     Diagnosis:  Acute respiratory failure, unspecified whether with hypoxia or hypercapnia (HCC) [J96.00]  Acute respiratory failure with hypoxia and hypercapnia ([address] [J96.01, J96.02]  Specific instructions for next treatment:  Sit EOB, postural exercises, Transfer to bedside chair, Increase ambulation distance, Stair negotiation and BLE therapeutic exercise  Current Treatment Recommendations:     [x] Strengthening to improve independence with functional mobility   [x] ROM to improve independence with functional mobility   [x] Balance Training to improve static/dynamic balance and to reduce fall risk  [x] Endurance Training to improve activity tolerance during functional mobility   [x] Transfer Training to improve safety and independence with all functional transfers   [x] Gait Training to improve gait mechanics, endurance and asses need for appropriate assistive device  [x] Stair Training in preparation for safe discharge home and/or into the community   [] Positioning to prevent skin breakdown and contractures  [x] Safety and Education Training   [] Patient/Caregiver Education   [] HEP  [] Other     PT long term treatment goals are located in above grid    Frequency of treatments: 2-5x/week x 1-2 weeks. Time in  11:00  Time out  11:25    Total Treatment Time  15 minutes     Evaluation Time includes thorough review of current medical information, gathering information on past medical history/social history and prior level of function, completion of standardized testing/informal observation of tasks, assessment of data and education on plan of care and goals.     CPT codes:  [x] Low Complexity PT evaluation 13551  [] Moderate Complexity PT evaluation 17401  [] High Complexity PT evaluation 35387  [] PT Re-evaluation 23028  [] Gait training 15149 - minutes  [] Manual therapy 05783 - minutes  [x] Therapeutic activities 06588 15 minutes  [] Therapeutic exercises 24789 - minutes  [] Neuromuscular reeducation 51557 - minutes     TODD Montilla  [place], [place] [id number]

## 2021-12-03 VITALS
TEMPERATURE: 97.9 F | WEIGHT: 152.34 LBS | RESPIRATION RATE: 16 BRPM | SYSTOLIC BLOOD PRESSURE: 138 MMHG | DIASTOLIC BLOOD PRESSURE: 95 MMHG | BODY MASS INDEX: 29.75 KG/M2 | OXYGEN SATURATION: 99 % | HEART RATE: 89 BPM

## 2021-12-03 LAB
ALBUMIN SERPL-MCNC: 3.3 G/DL (ref 3.5–5.2)
ALP BLD-CCNC: 70 U/L (ref 35–104)
ALT SERPL-CCNC: 42 U/L (ref 0–32)
ANION GAP SERPL CALCULATED.3IONS-SCNC: 11 MMOL/L (ref 7–16)
AST SERPL-CCNC: 27 U/L (ref 0–31)
BASOPHILS ABSOLUTE: 0.01 E9/L (ref 0–0.2)
BASOPHILS RELATIVE PERCENT: 0.1 % (ref 0–2)
BILIRUB SERPL-MCNC: 0.3 MG/DL (ref 0–1.2)
BLOOD CULTURE, ROUTINE: NORMAL
BUN BLDV-MCNC: 20 MG/DL (ref 6–20)
CALCIUM SERPL-MCNC: 9.1 MG/DL (ref 8.6–10.2)
CHLORIDE BLD-SCNC: 99 MMOL/L (ref 98–107)
CO2: 34 MMOL/L (ref 22–29)
CREAT SERPL-MCNC: 0.4 MG/DL (ref 0.5–1)
CULTURE, BLOOD 2: NORMAL
EOSINOPHILS ABSOLUTE: 0 E9/L (ref 0.05–0.5)
EOSINOPHILS RELATIVE PERCENT: 0 % (ref 0–6)
GFR AFRICAN AMERICAN: >60
GFR NON-AFRICAN AMERICAN: >60 ML/MIN/1.73
GLUCOSE BLD-MCNC: 183 MG/DL (ref 74–99)
HCT VFR BLD CALC: 35.1 % (ref 34–48)
HEMOGLOBIN: 11.5 G/DL (ref 11.5–15.5)
IMMATURE GRANULOCYTES #: 0.07 E9/L
IMMATURE GRANULOCYTES %: 0.7 % (ref 0–5)
LYMPHOCYTES ABSOLUTE: 1.06 E9/L (ref 1.5–4)
LYMPHOCYTES RELATIVE PERCENT: 9.9 % (ref 20–42)
MCH RBC QN AUTO: 33.2 PG (ref 26–35)
MCHC RBC AUTO-ENTMCNC: 32.8 % (ref 32–34.5)
MCV RBC AUTO: 101.4 FL (ref 80–99.9)
METER GLUCOSE: 163 MG/DL (ref 74–99)
MONOCYTES ABSOLUTE: 0.27 E9/L (ref 0.1–0.95)
MONOCYTES RELATIVE PERCENT: 2.5 % (ref 2–12)
NEUTROPHILS ABSOLUTE: 9.27 E9/L (ref 1.8–7.3)
NEUTROPHILS RELATIVE PERCENT: 86.8 % (ref 43–80)
PDW BLD-RTO: 12.2 FL (ref 11.5–15)
PLATELET # BLD: 351 E9/L (ref 130–450)
PMV BLD AUTO: 9.6 FL (ref 7–12)
POTASSIUM SERPL-SCNC: 4.4 MMOL/L (ref 3.5–5)
RBC # BLD: 3.46 E12/L (ref 3.5–5.5)
SODIUM BLD-SCNC: 144 MMOL/L (ref 132–146)
TOTAL PROTEIN: 6 G/DL (ref 6.4–8.3)
WBC # BLD: 10.7 E9/L (ref 4.5–11.5)

## 2021-12-03 PROCEDURE — 6370000000 HC RX 637 (ALT 250 FOR IP): Performed by: STUDENT IN AN ORGANIZED HEALTH CARE EDUCATION/TRAINING PROGRAM

## 2021-12-03 PROCEDURE — 2580000003 HC RX 258: Performed by: STUDENT IN AN ORGANIZED HEALTH CARE EDUCATION/TRAINING PROGRAM

## 2021-12-03 PROCEDURE — 6360000002 HC RX W HCPCS: Performed by: STUDENT IN AN ORGANIZED HEALTH CARE EDUCATION/TRAINING PROGRAM

## 2021-12-03 PROCEDURE — 36415 COLL VENOUS BLD VENIPUNCTURE: CPT

## 2021-12-03 PROCEDURE — 6370000000 HC RX 637 (ALT 250 FOR IP): Performed by: INTERNAL MEDICINE

## 2021-12-03 PROCEDURE — 6370000000 HC RX 637 (ALT 250 FOR IP): Performed by: NURSE PRACTITIONER

## 2021-12-03 PROCEDURE — 2500000003 HC RX 250 WO HCPCS: Performed by: INTERNAL MEDICINE

## 2021-12-03 PROCEDURE — C9113 INJ PANTOPRAZOLE SODIUM, VIA: HCPCS | Performed by: STUDENT IN AN ORGANIZED HEALTH CARE EDUCATION/TRAINING PROGRAM

## 2021-12-03 PROCEDURE — 6360000002 HC RX W HCPCS: Performed by: INTERNAL MEDICINE

## 2021-12-03 PROCEDURE — 2580000003 HC RX 258: Performed by: INTERNAL MEDICINE

## 2021-12-03 PROCEDURE — 85025 COMPLETE CBC W/AUTO DIFF WBC: CPT

## 2021-12-03 PROCEDURE — 82962 GLUCOSE BLOOD TEST: CPT

## 2021-12-03 PROCEDURE — 2700000000 HC OXYGEN THERAPY PER DAY

## 2021-12-03 PROCEDURE — 97535 SELF CARE MNGMENT TRAINING: CPT

## 2021-12-03 PROCEDURE — 94640 AIRWAY INHALATION TREATMENT: CPT

## 2021-12-03 PROCEDURE — 80053 COMPREHEN METABOLIC PANEL: CPT

## 2021-12-03 PROCEDURE — 97530 THERAPEUTIC ACTIVITIES: CPT

## 2021-12-03 RX ORDER — CHLORHEXIDINE GLUCONATE 0.12 MG/ML
15 RINSE ORAL 2 TIMES DAILY
Qty: 420 ML | Refills: 0 | Status: SHIPPED | OUTPATIENT
Start: 2021-12-03 | End: 2021-12-17

## 2021-12-03 RX ORDER — PREDNISONE 20 MG/1
40 TABLET ORAL DAILY
Qty: 20 TABLET | Refills: 0 | Status: SHIPPED | OUTPATIENT
Start: 2021-12-04 | End: 2021-12-14

## 2021-12-03 RX ORDER — DOXYCYCLINE HYCLATE 100 MG/1
100 CAPSULE ORAL EVERY 12 HOURS SCHEDULED
Status: DISCONTINUED | OUTPATIENT
Start: 2021-12-03 | End: 2021-12-03 | Stop reason: HOSPADM

## 2021-12-03 RX ORDER — DOXYCYCLINE HYCLATE 100 MG/1
100 CAPSULE ORAL EVERY 12 HOURS SCHEDULED
Qty: 4 CAPSULE | Refills: 0 | Status: SHIPPED | OUTPATIENT
Start: 2021-12-03 | End: 2021-12-05

## 2021-12-03 RX ADMIN — HEPARIN 300 UNITS: 100 SYRINGE at 09:52

## 2021-12-03 RX ADMIN — CEFEPIME HYDROCHLORIDE 2000 MG: 2 INJECTION, POWDER, FOR SOLUTION INTRAVENOUS at 06:42

## 2021-12-03 RX ADMIN — Medication 10 ML: at 09:54

## 2021-12-03 RX ADMIN — ARFORMOTEROL TARTRATE 15 MCG: 15 SOLUTION RESPIRATORY (INHALATION) at 11:52

## 2021-12-03 RX ADMIN — FOLIC ACID 1 MG: 1 TABLET ORAL at 09:50

## 2021-12-03 RX ADMIN — IPRATROPIUM BROMIDE AND ALBUTEROL SULFATE 1 AMPULE: .5; 2.5 SOLUTION RESPIRATORY (INHALATION) at 16:51

## 2021-12-03 RX ADMIN — ENOXAPARIN SODIUM 40 MG: 100 INJECTION SUBCUTANEOUS at 09:52

## 2021-12-03 RX ADMIN — BUDESONIDE 500 MCG: 0.5 INHALANT RESPIRATORY (INHALATION) at 11:52

## 2021-12-03 RX ADMIN — PANTOPRAZOLE SODIUM 40 MG: 40 INJECTION, POWDER, FOR SOLUTION INTRAVENOUS at 09:51

## 2021-12-03 RX ADMIN — DOXYCYCLINE 100 MG: 100 INJECTION, POWDER, LYOPHILIZED, FOR SOLUTION INTRAVENOUS at 09:53

## 2021-12-03 RX ADMIN — ASPIRIN 81 MG CHEWABLE TABLET 81 MG: 81 TABLET CHEWABLE at 09:50

## 2021-12-03 RX ADMIN — PREDNISONE 40 MG: 20 TABLET ORAL at 09:50

## 2021-12-03 RX ADMIN — IPRATROPIUM BROMIDE AND ALBUTEROL SULFATE 1 AMPULE: .5; 2.5 SOLUTION RESPIRATORY (INHALATION) at 11:52

## 2021-12-03 ASSESSMENT — PAIN SCALES - GENERAL: PAINLEVEL_OUTOF10: 0

## 2021-12-03 NOTE — DISCHARGE SUMMARY
aspirin/beta-blocker. No statin therapy given low LDL. No further work-up recommended from cardiology standpoint. Exam:     BP (!) 138/95   Pulse 89   Temp 97.9 °F (36.6 °C) (Temporal)   Resp 16   Wt 152 lb 5.4 oz (69.1 kg)   LMP 05/07/2021   SpO2 99%   BMI 29.75 kg/m²     General appearance: No apparent distress, appears stated age and cooperative. HEENT: Pupils equal, round, and reactive to light. Conjunctivae/corneas clear. Neck: Supple, with full range of motion. No jugular venous distention. Trachea midline. Respiratory: Mild decreased breath sounds  Cardiovascular: Regular rate and rhythm with normal S1/S2 without murmurs, rubs or gallops. Abdomen: Soft, non-tender, non-distended with normal bowel sounds. Musculoskeletal: No clubbing, cyanosis or edema bilaterally. Full range of motion without deformity. Skin: Skin color, texture, turgor normal.  No rashes or lesions. Neurologic:  Neurovascularly intact without any focal sensory/motor deficits. Cranial nerves: II-XII intact, grossly non-focal.  Psychiatric: Alert and oriented, thought content appropriate, normal insight      Consults:     IP CONSULT TO CRITICAL CARE  IP CONSULT TO CARDIOLOGY  IP CONSULT TO PULMONOLOGY    Significant Diagnostic Studies:     CTA OF THE CHEST 11/28/2021 4:27 am       TECHNIQUE:   CTA of the chest was performed after the administration of intravenous   contrast.  Multiplanar reformatted images are provided for review. MIP   images are provided for review. Dose modulation, iterative reconstruction,   and/or weight based adjustment of the mA/kV was utilized to reduce the   radiation dose to as low as reasonably achievable.        COMPARISON:   Portable chest performed earlier today at 0132 hours and CT dated 01/11/2021       HISTORY:   ORDERING SYSTEM PROVIDED HISTORY: r/o PE   TECHNOLOGIST PROVIDED HISTORY:   Reason for exam:->r/o PE   What reading provider will be dictating this exam?->CRC       Shortness of breath. Status post intubation. FINDINGS:   Pulmonary Arteries: Evaluation is slightly limited due to patient motion   artifact. Allowing for this, no identified pulmonary embolism. Mediastinum: The thyroid is mildly prominent. Endotracheal tube terminates   above the jose m near the level of the aortic arch. Scattered vascular   calcifications. Normal-size lymph nodes without adenopathy by size criteria. Heart size is normal.  No pleural or pericardial effusion. Lungs/pleura: No pneumothorax or effusion. Assessment is degraded by patient   motion. Allowing for this, there appear to be some scattered areas of   minimal ill-defined opacification. Some of these demonstrate a slightly   tree-in-bud nodular pattern. Findings are likely due to   infectious/inflammatory process or possibly minimal edema. Edema thought   less likely. Upper Abdomen: The nasogastric tube extends into the stomach. Probable   hepatic fatty infiltration. Cholecystectomy clips. Soft Tissues/Bones: Degenerative changes are scattered in the spine. Impression   Exam somewhat limited by patient motion. Allowing for this, no identified   pulmonary embolism. Minimal opacities in the lungs favored to represent developing infectious or   inflammatory process. Mild edema less likely. Short-term follow-up if   symptoms persist.     ONE XRAY VIEW OF THE CHEST       11/27/2021 9:45 pm       COMPARISON:   July 13       HISTORY:   ORDERING SYSTEM PROVIDED HISTORY: sob   TECHNOLOGIST PROVIDED HISTORY:   Reason for exam:->sob   What reading provider will be dictating this exam?->CRC       FINDINGS:   Cardiomediastinal silhouette within normal limits. Atelectatic changes in the lung bases. No airspace consolidation or pleural effusions. Pulmonary vasculature is within normal limits. Impression   Atelectatic changes in the lung bases bilaterally.   No other radiographic evidence of acute cardiopulmonary disease     ONE XRAY VIEW OF THE CHEST       11/28/2021 12:57 am       COMPARISON:   11/27/2021       HISTORY:   ORDERING SYSTEM PROVIDED HISTORY: et tube placement   TECHNOLOGIST PROVIDED HISTORY:   Reason for exam:->et tube placement   What reading provider will be dictating this exam?->CRC       FINDINGS:   EKG leads overlie the chest.  Endotracheal tube terminates above the jose m   near the level of the aortic arch. Nasogastric tube extends into the stomach   with the tip out of the field of view. Cardiac silhouette is near the upper   limits of normal.  No obvious pneumothorax. Minimal basal opacities likely   representing atelectasis although early infiltrates not excluded. These are   similar to previous. No other interval change. Impression   Interval intubation and placement of nasogastric tube. No other significant   change. Continued follow-up recommended     ONE XRAY VIEW OF THE CHEST       11/30/2021 8:54 am       COMPARISON:   CT from November 28, 2021       HISTORY:   ORDERING SYSTEM PROVIDED HISTORY: PNA   TECHNOLOGIST PROVIDED HISTORY:   Reason for exam:->PNA   What reading provider will be dictating this exam?->CRC       FINDINGS:   Endotracheal tube projects 3 cm above the jose m. Enteric tube projects   below the field of view. No pleural effusion, pneumothorax or focal   consolidation. No significant pulmonary edema. Impression   No acute findings or focal consolidation. The subtle opacities seen on CT   are not visible radiographically. Disposition: Home    Discharge Instructions/Follow-up:    Follow-up with PCP in 1 week  Follow-up with pulmonologist in 2 weeks    Code Status:  Full Code     Activity: activity as tolerated    Diet: cardiac diet    Labs:  For convenience and continuity at follow-up the following most recent labs are provided:      CBC:    Lab Results   Component Value Date    WBC 10.7 12/03/2021    HGB 11.5 12/03/2021    HCT 35.1 12/03/2021     12/03/2021       Renal:    Lab Results   Component Value Date     12/03/2021    K 4.4 12/03/2021    K 3.9 12/01/2021    CL 99 12/03/2021    CO2 34 12/03/2021    BUN 20 12/03/2021    CREATININE 0.4 12/03/2021    CALCIUM 9.1 12/03/2021    PHOS 4.1 11/30/2021       Discharge Medications:     Current Discharge Medication List           Details   chlorhexidine (PERIDEX) 0.12 % solution Take 15 mLs by mouth 2 times daily for 14 days  Qty: 420 mL, Refills: 0      doxycycline hyclate (VIBRAMYCIN) 100 MG capsule Take 1 capsule by mouth every 12 hours for 4 doses  Qty: 4 capsule, Refills: 0              Details   predniSONE (DELTASONE) 20 MG tablet Take 2 tablets by mouth daily for 10 days  Qty: 20 tablet, Refills: 0      OXYGEN Inhale 4 L/min into the lungs continuous  Qty: 1 Device, Refills: 0              Details   metoprolol tartrate (LOPRESSOR) 25 MG tablet Take 12.5 mg by mouth 2 times daily      albuterol (PROVENTIL) (2.5 MG/3ML) 0.083% nebulizer solution Take 2.5 mg by nebulization every 4-6 hours as needed for Wheezing or Shortness of Breath       fluticasone-umeclidin-vilant (TRELEGY ELLIPTA) 100-62.5-25 MCG/INH AEPB Inhale 1 puff into the lungs daily       albuterol (PROVENTIL HFA;VENTOLIN HFA) 108 (90 BASE) MCG/ACT inhaler Inhale 2 puffs into the lungs every 6 hours as needed for Wheezing or Shortness of Breath       loratadine (CLARITIN) 10 MG tablet Take 10 mg by mouth daily      guaiFENesin (MUCINEX MAXIMUM STRENGTH) 1200 MG TB12 Take 1 tablet by mouth 2 times daily       omeprazole (PRILOSEC) 20 MG delayed release capsule Take 40 mg by mouth daily              Time Spent on discharge is more than 45 minutes in the examination, evaluation, counseling and review of medications and discharge plan. Signed:    Salvador Dale MD   12/3/2021      Thank you Steven Renteria DO for the opportunity to be involved in this patient's care.  If you have any questions or concerns please feel free to contact me at [phone].

## 2021-12-03 NOTE — PROGRESS NOTES
required pressor support with Levophed. Levophed was successfully weaned off. Patient was extubated 11/30/2021 and was weaned off oxygen slowly      Acute on chronic hypoxic/hypercapnic respiratory failure secondary to acute COPD exacerbation  -Critical care consulted-apprec crecs  -pulmonary-apprec recs  -Continue Solu-Medrol/DuoNeb  - currently on NC 4L-home requirement of 1 to 2 L as needed    Bilateral lung opacities  -Likely developing pneumonia as per CTA chest  -No evidence of acute PE  -Continue ceftriaxone/doxycycline  - urine Legionella/streptococcal antigen-presumptive neg    Septic shock  -Likely secondary to above   - Blood c/s -24hrs  NG  -Currently off of pressor support  -Continue antibiotics as above    Elevated troponin  -Cardiology evaluation appreciated. Elevated troponin reflective of acute myocardial injury in the setting of COPD exacerbation. No clinical evidence of ischemia per EKG. -As per cardiology, continue aspirin/beta-blocker. No statin therapy given low LDL. No further work-up recommended from cardiology standpoint. DVT prophylaxis  -Lovenox subcu      DISPOSITION: Pending clinical improvement- Weaning oxygen, patient has a walker at home and is to discharge to home when clinically stable.   Possibly tomorrow    Medications:  REVIEWED DAILY    Infusion Medications    sodium chloride      dextrose      fentaNYL 5 mcg/ml in 0.9%  ml infusion Stopped (11/30/21 2253)     Scheduled Medications    predniSONE  40 mg Oral Daily    folic acid  1 mg Oral Daily    sodium chloride flush  5-40 mL IntraVENous 2 times per day    heparin flush  3 mL IntraVENous 2 times per day    enoxaparin  40 mg SubCUTAneous Daily    pantoprazole  40 mg IntraVENous Daily    chlorhexidine  15 mL Mouth/Throat BID    cefepime  2,000 mg IntraVENous Q8H    ipratropium-albuterol  1 ampule Inhalation Q4H While awake    budesonide  0.5 mg Nebulization BID    Arformoterol Tartrate  15 mcg Nebulization BID    doxycycline (VIBRAMYCIN) IV  100 mg IntraVENous Q12H    aspirin  81 mg Oral Daily     PRN Meds: sodium chloride flush, sodium chloride, heparin flush, glucose, dextrose, midazolam, ondansetron **OR** [DISCONTINUED] ondansetron, acetaminophen **OR** acetaminophen, ondansetron, glucagon (rDNA), dextrose, polyethylene glycol, potassium chloride, magnesium sulfate, perflutren lipid microspheres    Labs:     Recent Labs     12/01/21 0400 12/02/21 0445 12/03/21 0415   WBC 12.8* 11.8* 10.7   HGB 10.7* 11.1* 11.5   HCT 32.6* 34.7 35.1    330 351       Recent Labs     12/01/21 0400 12/02/21 0445 12/03/21 0415    146 144   K 3.9  3.9 3.6 4.4    105 99   CO2 31* 33* 34*   BUN 18 22* 20   CREATININE 0.5 0.4* 0.4*   CALCIUM 9.1 8.8 9.1       Recent Labs     12/01/21 0400 12/02/21 0445 12/03/21 0415   PROT 6.1* 5.7* 6.0*   ALKPHOS 81 73 70   ALT 45* 44* 42*   AST 32* 30 27   BILITOT 0.2 0.3 0.3       No results for input(s): INR in the last 72 hours. No results for input(s): Cynthia Kras in the last 72 hours. Chronic labs:    Lab Results   Component Value Date    CHOL 150 11/28/2021    TRIG 164 (H) 11/30/2021    HDL 59 11/28/2021    LDLCALC 20 11/28/2021    TSH 0.402 11/28/2021    INR 1.1 04/16/2013    LABA1C 6.2 (H) 11/28/2021       Radiology: REVIEWED DAILY    +++++++++++++++++++++++++++++++++++++++++++++++++  Ruddy Jaffe MD  [facility] [place] Physician - Hospitalist  [address] [place] [address]  +++++++++++++++++++++++++++++++++++++++++++++++++  NOTE: This report was transcribed using voice recognition software. Every effort was made to ensure accuracy; however, inadvertent computerized transcription errors may be present.

## 2021-12-03 NOTE — PLAN OF CARE
Problem: Skin Integrity:  Goal: Will show no infection signs and symptoms  Description: Will show no infection signs and symptoms  Outcome: Ongoing  Goal: Absence of new skin breakdown  Description: Absence of new skin breakdown  Outcome: Ongoing     Problem: Falls - Risk of:  Goal: Will remain free from falls  Description: Will remain free from falls  Outcome: Ongoing  Goal: Absence of physical injury  Description: Absence of physical injury  Outcome: Ongoing     Problem: Breathing Pattern - Ineffective:  Goal: Ability to achieve and maintain a regular respiratory rate will improve  Description: Ability to achieve and maintain a regular respiratory rate will improve  Outcome: Ongoing     Problem: Daily Care:  Goal: Daily care needs are met  Description: Daily care needs are met  Outcome: Ongoing     Problem: OXYGENATION/RESPIRATORY FUNCTION  Goal: Patient will maintain patent airway  Outcome: Ongoing  Goal: Patient will achieve/maintain normal respiratory rate/effort  Description: Respiratory rate and effort will be within normal limits for the patient  Outcome: Ongoing     Problem: Pain:  Goal: Pain level will decrease  Description: Pain level will decrease  Outcome: Ongoing  Goal: Control of acute pain  Description: Control of acute pain  Outcome: Ongoing  Goal: Control of chronic pain  Description: Control of chronic pain  Outcome: Ongoing

## 2021-12-03 NOTE — CARE COORDINATION
Discharge plan is home with family when medically stable. on PO steroids. Plan is home with family.  Breonna Navas RN

## 2021-12-03 NOTE — PROGRESS NOTES
Nutrition Assessment     Type and Reason for Visit: RD Nutrition Re-Screen/LOS, Initial (rd re-screen negative)    Nutrition Assessment:  Pt assessed per LOS protocol. Chart reviewed. Pt currently w/ no noted significant poor intake (noted poor intake yesterday however ~75% previously s/p extubation) and w/ no other significant nutritional issues noted at this time. Will start ONS to optimize PO intake and follow-up per policy. Please consult if RD needed.     Electronically signed by Sherif Mcnair RD, RJ on 12/3/21 at 10:28 AM EST    Contact: ext [phone]

## 2021-12-03 NOTE — PROGRESS NOTES
CLINICAL PHARMACY NOTE: MEDS TO BEDS    Total # of Prescriptions Filled: 3   The following medications were delivered to the patient:  · Doxycycline 100 mg  · Prednisone 20 mg  · Chlorhexidone 0.12% solution    Additional Documentation:  Delivered to patient @4:10

## 2021-12-03 NOTE — PROGRESS NOTES
35.1   .9* 105.5* 101.4*    330 351     Recent Labs     21  0400 21  0445 21  0415    146 144   K 3.9  3.9 3.6 4.4    105 99   CO2 31* 33* 34*   BUN 18 22* 20   CREATININE 0.5 0.4* 0.4*     Recent Labs     21  0400 215 215   PROT 6.1* 5.7* 6.0*   ALKPHOS 81 73 70   ALT 45* 44* 42*   AST 32* 30 27   BILITOT 0.2 0.3 0.3     CPK:  Lab Results   Component Value Date    CKTOTAL 93 2013          -----------------------------------------------------------------  RAD:   Results for orders placed during the hospital encounter of 21    XR CHEST PORTABLE    Narrative  EXAMINATION:  ONE XRAY VIEW OF THE CHEST    2021 8:54 am    COMPARISON:  CT from 2021    HISTORY:  ORDERING SYSTEM PROVIDED HISTORY: PNA  TECHNOLOGIST PROVIDED HISTORY:  Reason for exam:->PNA  What reading provider will be dictating this exam?->CRC    FINDINGS:  Endotracheal tube projects 3 cm above the jose m. Enteric tube projects  below the field of view. No pleural effusion, pneumothorax or focal  consolidation. No significant pulmonary edema. Impression  No acute findings or focal consolidation. The subtle opacities seen on CT  are not visible radiographically.       Micro:          Objective:   Vitals:   Vitals:    21 0815   BP: (!) 138/95   Pulse: 89   Resp: 16   Temp: 97.9 °F (36.6 °C)   SpO2: 99%      TEMP:Current: Temp: 97.9 °F (36.6 °C)  Max: Temp  Av.7 °F (36.5 °C)  Min: 97.2 °F (36.2 °C)  Max: 98 °F (36.7 °C)    BP Range: Systolic (56BCH), FRH:780 , Min:127 , ROT:667     Diastolic (98PPS), ODV:62, Min:70, Max:95      General appearance: alert, appears stated age and cooperative  In no acute distress  Skin: No rashes or lesions  HEENT: mucous membranes are moist  Neck: No JVD  Lungs: symmetrical expansion, clear to auscultation, no use of accessory muscles  Heart: S1S2 no murmurs,  Abdomen: soft, non tender,   Extremities: no

## 2021-12-03 NOTE — PROGRESS NOTES
PICC line D/C with no difficulty. Discharge instructions reviewed and given to patient. Awaiting delivery of meds from pharmacy.

## 2021-12-03 NOTE — PROGRESS NOTES
Occupational Therapy  OT BEDSIDE TREATMENT NOTE   [address] [facility] [address] [facility]  [address] [place] [address]      ABDOULAYE:  Patient Name: Luis Felipe Chow  MRN: [id number]  : 1970  Room: [address] [place] [address]     Evaluating OT: Rina Palacio OTR/L License #  [id number]        Referring Provider: Marcos Lyn MD    Specific Provider Orders/Date: OT evaluation & treatment        Diagnosis: Acute on chronic hypoxic/hypercapnic respiratory failure secondary to acute COPD exacerbation     Surgery:   Past Surgical History         Past Surgical History:   Procedure Laterality Date    CHOLECYSTECTOMY   2003    HERNIA REPAIR        umbilical    KNEE ARTHROSCOPY Right      LAPAROSCOPY        LEEP        LYMPH NODE BIOPSY        TOTAL KNEE ARTHROPLASTY Right 2021     RIGHT KNEE JOHNNIE ROBOTIC ASSISTED TOTAL ARTHROPLASTY  PNB performed by Antonia Wakefield MD at [facility] OR           Pertinent Medical History:  has a past medical history of Anxiety, Arthritis, Asthma, Chronic back pain, Chronic lower back pain, Chronic neck pain, COPD, COPD exacerbation ([address] Difficult intravenous access, Fibrocystic breast changes, GERD (gastroesophageal reflux disease), Herpes zoster ophthalmicus, Irritable bowel syndrome, Obesity, On supplemental oxygen therapy, Psoriasis, Right knee pain, RSV bronchitis, Tinea versicolor, Wears contact lenses, Wears dentures, and Wears glasses. Precautions:  Fall Risk, supplemental O2 at 4L currently, extubated 21.      Assessment of current deficits    []? Functional mobility            [x]?ADLs           [x]? Strength                  [x]? Cognition    [x]? Functional transfers          [x]? IADLs         [x]? Safety Awareness   [x]? Endurance    [x]? Fine Coordination                         [x]? Balance      []? Vision/perception   [x]? Sensation      []? Gross Motor Coordination             []? ROM           []?  Delirium []? Motor Control      OT PLAN OF CARE   OT POC based on physician orders, patient diagnosis and results of clinical assessment     Frequency/Duration: 2-4 days/wk for 2 weeks PRN   Specific OT Treatment Interventions to include:   Instruction/training on adapted ADL techniques and AE recommendations to increase functional independence within precautions  Training on energy conservation strategies, correct breathing pattern and techniques to improve independence/tolerance for self-care routine  Functional transfer/mobility training/DME recommendations for increased independence, safety, and fall prevention  Patient/Family education to increase follow through with safety techniques and functional independence  Recommendation of environmental modifications for increased safety with functional transfers/mobility and ADLs  Cognitive retraining/development of therapeutic activities to improve problem solving, judgement, memory, and attention for increased safety/participation in ADL/IADL tasks  Therapeutic exercise to improve motor endurance, ROM, and functional strength for ADLs/functional transfers  Therapeutic activities to facilitate/challenge dynamic balance, stand tolerance for increased safety and independence with ADLs  Therapeutic activities to facilitate gross/fine motor skills for increased independence with ADLs  Positioning to improve skin integrity, interaction with environment and functional independence     Recommended Adaptive Equipment:  TBD      Home Living: Pt lives with spouse, 2 adult children & 2 grandchildren in a 2 story with 1+1+2 steps to enter with 1 HR. B&B on main level. Bathroom setup: tub/shower, std. commode   Equipment owned: in-dwelling tub seat, ww, BSC, home O2 for night use prn     Prior Level of Function: Ind. with ADLs , shared with spouse with IADLs; ambulated no A.D.   Driving: active  Occupation: [employer]     Pain Level: no pain  Cognition: A&O: 4/4; Follows 2 step directions              Memory:  good              Sequencing:  good              Problem solving:  good              Judgement/safety:  Fair/+                Functional Assessment:  AM-PAC Daily Activity Raw Score: 19/24    Initial Eval Status  Date: 12-2-21 Treatment Status  Date: 12/3/21 STGs = LTGs  Time frame: 10-14 days   Feeding Ind. Ind      Grooming SBA in standing Sup  To wash hands standing at sink  Modified [place]    UB Dressing SBA Set up  Modified [place]    LB Dressing SBA/Min A to evelyne socks seated EOB, figure 4 technique Sup  To don/doff socks seated in bedside chair  Modified [place]    Bathing Min A with sim. task  Sup  Simulated seated Modified [place]    Toileting Min A Sup- clothing management  ind- hygiene  Modified [place]    Bed Mobility  Logroll: SBA/Sup  Supine to sit: SBA/Sup  Sit to supine: NT   Ind- supine>sit Supine to sit: Modified [place]   Sit to supine: Modified [place]    Functional Transfers SBA with sit <> stand, SPT without A.D.  Sup- sit<->stand  Sup- toilet transfer Modified [place]    Functional Mobility SBA without A.D. ~household distances Sup  Home distance no AD  Modified [place]    Balance Sitting:     Static: Ind. Dynamic: SBA/Sup  Standing: SBA Sitting:     Static: Ind. Dynamic: Ind  Standing: Sup     Activity Tolerance Fair- with lt. Ax. Pt. C/o min. dyspnea with lt. exertion, reviewed E.C. techniques as well as breathing techniques Fair   Fair+ with mod. Ax. Visual/  Perceptual Glasses: yes          Vitals spO2 on 4L viz NC: 94-95%  HR WFL throughout  O2 decreased to 84% on 3L when coming from rest room. Increased to 94% on 4L  WFL       Comments: Upon arrival pt supine in bed. Pt educated on techniques to increase independence and safety during ADL's, and functional transfers. Discussed home set up with pt, giving suggestions to increase safety at discharge.  Pt educated on energy conservation with hand outs given. At end of session pt left seated in bedside chair, call light within reach. · Pt has made good progress towards set goals.      · Continue with current plan of care    Treatment Time In: 8:54            Treatment Time Out: 9:20             Treatment Charges: Mins Units   Ther Ex  11170     Manual Therapy [id number] [facility]     Thera Activities 55991 14 1   ADL/Home Mgt 89245 10 1   Neuro Re-ed [id number]     Group Therapy      Orthotic manage/training  82075     Non-Billable Time     Total Timed Treatment 24 2     Natalia Campoverde

## 2021-12-06 ENCOUNTER — TELEPHONE (OUTPATIENT)
Dept: FAMILY MEDICINE CLINIC | Age: 51
End: 2021-12-06

## 2021-12-06 NOTE — TELEPHONE ENCOUNTER
[facility] Transitions Initial Follow Up Call    Outreach made within 2 business days of discharge: Yes    Patient: Michelle Velarde Patient : 1970   MRN: [id number]  Reason for Admission: Acute respiratory failure with hypoxia and hypercapnia [facility])  Discharge Date: 12/3/21       Spoke with: Victorina Moyer    Discharge department/facility: [facility]    TCM Interactive Patient Contact:  Was patient able to fill all prescriptions: Yes  Was patient instructed to bring all medications to the follow-up visit: Yes  Is patient taking all medications as directed in the discharge summary?  Yes  Does patient understand their discharge instructions: Yes  Does patient have questions or concerns that need addressed prior to 7-14 day follow up office visit: no    Scheduled appointment with PCP within 7-14 days    Follow Up  Future Appointments   Date Time Provider Carlos Angel   2021  2:30 PM DO Estefania Smith [facility]   2021  4:00 PM [facility] RM 1 SEYZ [facility]   2021  7:30 AM DO Estefania Smith [place], [place]

## 2021-12-09 PROBLEM — J96.01 ACUTE RESPIRATORY FAILURE WITH HYPOXIA AND HYPERCAPNIA (HCC): Status: RESOLVED | Noted: 2021-11-28 | Resolved: 2021-12-09

## 2021-12-09 PROBLEM — J96.02 ACUTE RESPIRATORY FAILURE WITH HYPOXIA AND HYPERCAPNIA (HCC): Status: RESOLVED | Noted: 2021-11-28 | Resolved: 2021-12-09

## 2022-01-31 VITALS
HEART RATE: 105 BPM | HEIGHT: 61 IN | BODY MASS INDEX: 28.32 KG/M2 | WEIGHT: 150 LBS | DIASTOLIC BLOOD PRESSURE: 84 MMHG | RESPIRATION RATE: 18 BRPM | OXYGEN SATURATION: 98 % | SYSTOLIC BLOOD PRESSURE: 142 MMHG

## 2022-04-13 NOTE — PLAN OF CARE
Problem: Pain:  Description  Pain management should include both nonpharmacologic and pharmacologic interventions. Goal: Pain level will decrease  Description  Pain level will decrease  12/29/2019 2136 by Jackie Hurd RN  Outcome: Met This Shift     Problem: Pain:  Description  Pain management should include both nonpharmacologic and pharmacologic interventions. Goal: Control of acute pain  Description  Control of acute pain  12/29/2019 2136 by Jackie Hurd RN  Outcome: Met This Shift     Problem: Breathing Pattern - Ineffective:  Goal: Ability to achieve and maintain a regular respiratory rate will improve  Description  Ability to achieve and maintain a regular respiratory rate will improve  12/29/2019 2136 by Jackie Hurd RN  Outcome: Met This Shift     Problem:  Activity:  Goal: Fatigue will decrease  Description  Fatigue will decrease  12/29/2019 2136 by Jackie Hurd RN  Outcome: Met This Shift Enbrel Counseling:  I discussed with the patient the risks of etanercept including but not limited to myelosuppression, immunosuppression, autoimmune hepatitis, demyelinating diseases, lymphoma, and infections.  The patient understands that monitoring is required including a PPD at baseline and must alert us or the primary physician if symptoms of infection or other concerning signs are noted.

## 2022-05-09 ENCOUNTER — OFFICE VISIT (OUTPATIENT)
Dept: PRIMARY CARE CLINIC | Age: 52
End: 2022-05-09
Payer: COMMERCIAL

## 2022-05-09 VITALS
DIASTOLIC BLOOD PRESSURE: 80 MMHG | SYSTOLIC BLOOD PRESSURE: 172 MMHG | HEART RATE: 115 BPM | WEIGHT: 168 LBS | RESPIRATION RATE: 20 BRPM | HEIGHT: 60 IN | TEMPERATURE: 96.8 F | OXYGEN SATURATION: 98 % | BODY MASS INDEX: 32.98 KG/M2

## 2022-05-09 DIAGNOSIS — B02.9 HERPES ZOSTER WITHOUT COMPLICATION: Primary | ICD-10-CM

## 2022-05-09 PROCEDURE — 99213 OFFICE O/P EST LOW 20 MIN: CPT | Performed by: NURSE PRACTITIONER

## 2022-05-09 RX ORDER — VALACYCLOVIR HYDROCHLORIDE 1 G/1
1000 TABLET, FILM COATED ORAL 3 TIMES DAILY
Qty: 21 TABLET | Refills: 0 | Status: SHIPPED | OUTPATIENT
Start: 2022-05-09 | End: 2022-05-16

## 2022-05-09 NOTE — PROGRESS NOTES
Chief Complaint:   Herpes Zoster      History of Present Illness   Source of history provided by:  patient. Jasmin Mancilla is a 45 y.o. old female who has a past medical history of:   Past Medical History:   Diagnosis Date    Acute respiratory failure with hypoxia and hypercapnia (HCC) 11/28/2021    Anxiety     Arthritis     Asthma     Chronic back pain     Chronic lower back pain     Chronic neck pain     COPD     COPD exacerbation ([address] 12/26/2019    Difficult intravenous access     and venipuncture    Fibrocystic breast changes     GERD (gastroesophageal reflux disease)     Hiatal hernia    Herpes zoster ophthalmicus     Irritable bowel syndrome     Obesity     On supplemental oxygen therapy     1l/min/nc nightly and daily prn    Psoriasis     Right knee pain 07/2021    for [place] Failing 07/2021    RSV bronchitis 12/30/2019    Tinea versicolor     Wears contact lenses     Wears dentures     upper    Wears glasses         Pt presents to the [facility] care for complaint of redness to the right upper back/breast, which began few days ago. Prior to the rash appearing, pt has some muscular pain at the same area. Since onset the symptoms have been worsening. Pt has not had similar symptoms in the past.  Pt states the area is warm to touch, mildly painful and itchy, and has no noted streaking. Denies any fever, chills, HA, recent illness, neck stiffness, vomiting, or lethargy.        ROS    Unless otherwise stated in this report or unable to obtain because of the patient's clinical or mental status as evidenced by the medical record, this patients's positive and negative responses for Review of Systems, constitutional, psych, eyes, ENT, cardiovascular, respiratory, gastrointestinal, neurological, genitourinary, musculoskeletal, integument systems and systems related to the presenting problem are either stated in the preceding or were not pertinent or were negative for the symptoms and/or complaints related to the medical problem. Past Surgical History:  has a past surgical history that includes laparoscopy; Cholecystectomy (11/2003); LEEP; lymph node biopsy; Knee arthroscopy (Right); hernia repair (11/2003); and Total knee arthroplasty (Right, 7/12/2021). Social History:  reports that she has been smoking cigarettes. She has a 20.00 pack-year smoking history. She has never used smokeless tobacco. She reports current alcohol use. She reports that she does not use drugs. Family History: family history includes Arthritis in her father and sister; Cancer (age of onset: 40) in her mother; Diabetes in her father and sister; Heart Disease in her father; High Blood Pressure in her father and sister; Kidney Disease in her father; Psoriasis in her father. Allergies: Codeine, Sulfa antibiotics, and Demerol hcl [meperidine]    Physical Exam         VS:  BP (!) 172/80 (Site: Left Upper Arm, Position: Sitting, Cuff Size: Large Adult)   Pulse 115   Temp 96.8 °F (36 °C) (Temporal)   Resp 20   Ht 5' (1.524 m)   Wt 168 lb (76.2 kg)   LMP 05/07/2021   SpO2 98%   BMI 32.81 kg/m²    Oxygen Saturation Interpretation: Normal.    Constitutional:  Alert, development consistent with age. HEENT:  NC/NT. Airway patent. Eyes:  PERRL, EOMI, no discharge. Mouth:  Mucous membranes moist without lesions, tongue and gums normal.  Throat:   Airway patient. Neck:  Supple. No lymphadenopathy. Lungs:  Clear to auscultation and breath sounds equal.  Heart:  Regular rate and rhythm. Skin:  Normal turgor and appropriately dry to touch. Right upper back/breast area with moderate erythema and vesicles in clusters following a dermatomal pattern. Moderate TTP over same area and slightly warm to touch. No excoriations, macules, papules, or bullae noted. No drainage noted. Neurological:  Orientation age-appropriate unless noted elseware. Motor functions intact.     Lab / Imaging Results   (All laboratory and radiology results have been personally reviewed by myself)  Labs:      Imaging: All Radiology results interpreted by Radiologist unless otherwise noted. Assessment / Plan     Impression(s):  1.  Herpes zoster without complication      Disposition:  Disposition: Start Valtrex today, Advised on Oatmeal bathes, avoid pregnant women and immunocompromised individuals

## 2022-06-28 ENCOUNTER — TELEPHONE (OUTPATIENT)
Dept: FAMILY MEDICINE CLINIC | Age: 52
End: 2022-06-28

## 2022-06-28 ENCOUNTER — OFFICE VISIT (OUTPATIENT)
Dept: FAMILY MEDICINE CLINIC | Age: 52
End: 2022-06-28
Payer: COMMERCIAL

## 2022-06-28 VITALS
BODY MASS INDEX: 34.95 KG/M2 | OXYGEN SATURATION: 96 % | TEMPERATURE: 97.4 F | HEART RATE: 104 BPM | WEIGHT: 178 LBS | RESPIRATION RATE: 16 BRPM | SYSTOLIC BLOOD PRESSURE: 176 MMHG | HEIGHT: 60 IN | DIASTOLIC BLOOD PRESSURE: 108 MMHG

## 2022-06-28 DIAGNOSIS — I10 ESSENTIAL HYPERTENSION: ICD-10-CM

## 2022-06-28 DIAGNOSIS — Z13.21 ENCOUNTER FOR VITAMIN DEFICIENCY SCREENING: ICD-10-CM

## 2022-06-28 DIAGNOSIS — Z13.29 SCREENING FOR THYROID DISORDER: ICD-10-CM

## 2022-06-28 DIAGNOSIS — J44.9 STEROID-DEPENDENT COPD (HCC): ICD-10-CM

## 2022-06-28 DIAGNOSIS — Z76.89 ENCOUNTER TO ESTABLISH CARE: Primary | ICD-10-CM

## 2022-06-28 DIAGNOSIS — F41.9 ANXIETY: ICD-10-CM

## 2022-06-28 DIAGNOSIS — R73.9 HYPERGLYCEMIA: ICD-10-CM

## 2022-06-28 DIAGNOSIS — E66.09 CLASS 1 OBESITY DUE TO EXCESS CALORIES WITHOUT SERIOUS COMORBIDITY WITH BODY MASS INDEX (BMI) OF 34.0 TO 34.9 IN ADULT: ICD-10-CM

## 2022-06-28 LAB — HBA1C MFR BLD: 6.4 %

## 2022-06-28 PROCEDURE — 99214 OFFICE O/P EST MOD 30 MIN: CPT | Performed by: NEUROMUSCULOSKELETAL MEDICINE & OMM

## 2022-06-28 PROCEDURE — 83036 HEMOGLOBIN GLYCOSYLATED A1C: CPT | Performed by: NEUROMUSCULOSKELETAL MEDICINE & OMM

## 2022-06-28 RX ORDER — LISINOPRIL 5 MG/1
5 TABLET ORAL DAILY
Qty: 30 TABLET | Refills: 5 | Status: SHIPPED | OUTPATIENT
Start: 2022-06-28

## 2022-06-28 RX ORDER — ESCITALOPRAM OXALATE 10 MG/1
10 TABLET, FILM COATED ORAL DAILY
Qty: 30 TABLET | Refills: 3
Start: 2022-06-28 | End: 2022-06-28 | Stop reason: SDUPTHER

## 2022-06-28 RX ORDER — ESCITALOPRAM OXALATE 10 MG/1
10 TABLET, FILM COATED ORAL DAILY
Qty: 30 TABLET | Refills: 3 | Status: SHIPPED | OUTPATIENT
Start: 2022-06-28

## 2022-06-28 ASSESSMENT — PATIENT HEALTH QUESTIONNAIRE - PHQ9
SUM OF ALL RESPONSES TO PHQ9 QUESTIONS 1 & 2: 5
7. TROUBLE CONCENTRATING ON THINGS, SUCH AS READING THE NEWSPAPER OR WATCHING TELEVISION: 3
1. LITTLE INTEREST OR PLEASURE IN DOING THINGS: 3
SUM OF ALL RESPONSES TO PHQ QUESTIONS 1-9: 18
6. FEELING BAD ABOUT YOURSELF - OR THAT YOU ARE A FAILURE OR HAVE LET YOURSELF OR YOUR FAMILY DOWN: 1
2. FEELING DOWN, DEPRESSED OR HOPELESS: 2
SUM OF ALL RESPONSES TO PHQ QUESTIONS 1-9: 18
8. MOVING OR SPEAKING SO SLOWLY THAT OTHER PEOPLE COULD HAVE NOTICED. OR THE OPPOSITE, BEING SO FIGETY OR RESTLESS THAT YOU HAVE BEEN MOVING AROUND A LOT MORE THAN USUAL: 3
5. POOR APPETITE OR OVEREATING: 3
9. THOUGHTS THAT YOU WOULD BE BETTER OFF DEAD, OR OF HURTING YOURSELF: 0
SUM OF ALL RESPONSES TO PHQ QUESTIONS 1-9: 18
10. IF YOU CHECKED OFF ANY PROBLEMS, HOW DIFFICULT HAVE THESE PROBLEMS MADE IT FOR YOU TO DO YOUR WORK, TAKE CARE OF THINGS AT HOME, OR GET ALONG WITH OTHER PEOPLE: 1
SUM OF ALL RESPONSES TO PHQ QUESTIONS 1-9: 18
4. FEELING TIRED OR HAVING LITTLE ENERGY: 3
3. TROUBLE FALLING OR STAYING ASLEEP: 0

## 2022-06-28 ASSESSMENT — ENCOUNTER SYMPTOMS
CHOKING: 0
COUGH: 0
WHEEZING: 1
VOMITING: 0
NAUSEA: 0
CHEST TIGHTNESS: 0
SHORTNESS OF BREATH: 1
STRIDOR: 0
ABDOMINAL PAIN: 0

## 2022-06-28 NOTE — PROGRESS NOTES
Edie Leiva (:  1970) is a 47 y.o. female,Established patient, here for evaluation of the following chief complaint(s):  New Patient (establishing care), Hypertension (htn since dec 2021), and Anxiety (since dec 2021)         ASSESSMENT/PLAN:  1. Encounter to establish care  2. Steroid-dependent COPD ([address]  Assessment & Plan:  Patient is currently taking prednisone 10 mg daily per Dr. Erik Friedman for her steroid-dependent COPD. Orders:  -     Comprehensive Metabolic Panel; Future  3. Anxiety  Assessment & Plan:  I did add Lexapro 10 mg daily due to her depression anxiety and she did test positive on the depression screen today in the office. We will see patient back in 1 month for evaluation and effectiveness of treatment  Orders:  -     LEXAPRO 10 MG tablet; Take 1 tablet by mouth daily, Disp-30 tablet, R-3, DAWNO PRINT  4. Class 1 obesity due to excess calories without serious comorbidity with body mass index (BMI) of 34.0 to 34.9 in adult  Assessment & Plan:  Recent weight gain her BMI today is 34.76 likely due to steroid use  5. Essential hypertension  Assessment & Plan:  I added lisinopril 5 mg daily to her antihypertensive regimen which included metoprolol 12.5 mg daily, due to systolic blood pressure today of 176/108. Orders:  -     metoprolol tartrate (LOPRESSOR) 25 MG tablet; Take 0.5 tablets by mouth 2 times daily, Disp-90 tablet, R-0Normal  -     lisinopril (PRINIVIL;ZESTRIL) 5 MG tablet; Take 1 tablet by mouth daily, Disp-30 tablet, R-5Normal  -     CBC with Auto Differential; Future  6. Screening for thyroid disorder  -     TSH; Future  7. Encounter for vitamin deficiency screening  -     Vitamin D 25 Hydroxy; Future  8. Hyperglycemia  -     Lipid Panel; Future  -     POCT glycosylated hemoglobin (Hb A1C)      Return in about 1 month (around 2022). Subjective   SUBJECTIVE/OBJECTIVE:  HPI 50-year-old female here to establish care.   She is a former patient of mine at my different office location. Patient comes in states that she was hospitalized in November 2021 with double pneumonia she was for a 2 to 3-day. Intubated and sedated. Her pulmonologist is Dr. Bety Mills. She then suffered from positive COVID in December 2021. He is currently vaccinated and has received 1 booster for COVID-19. He is now O2 dependent ranging from 1 to 4 L per nasal cannula continuous. She is currently taking doxycycline 100 mg daily per Dr. Bety Mills for antibiotic prophylaxis. Patient sees Dr. Bety Mills every 3 or 4 months. She stopped smoking in November 2021. Her hemoglobin A1c done in the office today was 6.4. Review of Systems   Constitutional: Positive for unexpected weight change (Patient has gained about 15 to 20 pounds since being on her daily prednisone for her end-stage COPD.). Negative for activity change, appetite change, chills, fatigue and fever. Respiratory: Positive for shortness of breath and wheezing. Negative for cough, choking, chest tightness and stridor. Cardiovascular: Negative for chest pain, palpitations and leg swelling. Gastrointestinal: Negative for abdominal pain, nausea and vomiting. Musculoskeletal: Negative for arthralgias. Objective   BP (!) 176/108 (Site: Left Upper Arm)   Pulse (!) 104   Temp 97.4 °F (36.3 °C)   Resp 16   Ht 5' (1.524 m)   Wt 178 lb (80.7 kg)   LMP 07/01/2021   SpO2 96%   BMI 34.76 kg/m²     Physical Exam  Vitals and nursing note reviewed. Constitutional:       General: She is not in acute distress. Appearance: Normal appearance. She is obese. She is not ill-appearing or diaphoretic. HENT:      Head: Normocephalic and atraumatic. Mouth/Throat:      Pharynx: Oropharynx is clear. No oropharyngeal exudate or posterior oropharyngeal erythema. Eyes:      General: No scleral icterus. Right eye: No discharge. Left eye: No discharge.       Conjunctiva/sclera: Conjunctivae normal. Cardiovascular:      Rate and Rhythm: Normal rate and regular rhythm. Pulses: Normal pulses. Heart sounds: Normal heart sounds. No murmur heard. No friction rub. No gallop. Pulmonary:      Effort: Pulmonary effort is normal. No respiratory distress. Breath sounds: Decreased air movement present. No stridor. Examination of the right-upper field reveals decreased breath sounds. Examination of the left-upper field reveals decreased breath sounds. Examination of the right-middle field reveals decreased breath sounds. Examination of the left-middle field reveals decreased breath sounds. Examination of the right-lower field reveals decreased breath sounds. Examination of the left-lower field reveals decreased breath sounds. Decreased breath sounds present. No wheezing, rhonchi or rales. Neurological:      Mental Status: She is alert and oriented to person, place, and time.    Psychiatric:         Mood and Affect: Mood normal.         Behavior: Behavior normal.             Past Medical History:   Diagnosis Date    Acute respiratory failure with hypoxia and hypercapnia (HCC) 11/28/2021    Anxiety     Arthritis     Asthma     Chronic back pain     Chronic lower back pain     Chronic neck pain     COPD     COPD exacerbation ([facility] 12/26/2019    Difficult intravenous access     and venipuncture    Fibrocystic breast changes     GERD (gastroesophageal reflux disease)     Hiatal hernia    Herpes zoster ophthalmicus     Irritable bowel syndrome     Obesity     On supplemental oxygen therapy     1l/min/nc nightly and daily prn    Psoriasis     Right knee pain 07/2021    for Arletta Juniper 07/2021    RSV bronchitis 12/30/2019    Tinea versicolor     Wears contact lenses     Wears dentures     upper    Wears glasses         Past Surgical History:   Procedure Laterality Date    CHOLECYSTECTOMY  11/2003    HERNIA REPAIR  [id number]/[id number]    umbilical    KNEE ARTHROSCOPY Right     LAPAROSCOPY      LEEP  LYMPH NODE BIOPSY      TOTAL KNEE ARTHROPLASTY Right 7/12/2021    RIGHT KNEE JOHNNIE ROBOTIC ASSISTED TOTAL ARTHROPLASTY  PNB performed by Priscilla Bowers MD at [address]        The 10-year ASCVD risk score (Lawrence Villegas., et al., 2013) is: 2.8%    Values used to calculate the score:      Age: 47 years      Sex: Female      Is Non- : No      Diabetic: No      Tobacco smoker: No      Systolic Blood Pressure: 749 mmHg      Is BP treated: Yes      HDL Cholesterol: 59 mg/dL      Total Cholesterol: 150 mg/dL     Educational materials and/or home exercises printed for patient's review and were included inpatient instructions on his/her After Visit Summary and given to patient at the end of visit.     regarding above diagnosis, including possible risks and complications,  especially if left uncontrolled. Counseled regarding the possible side effects, risks, benefits and alternatives to treatment; patient and/or guardian verbalizes understanding, agrees, feels comfortable with and wishes to proceed withabove treatment plan. Advised patient to call with any new medication issues, and read all Rx infofrom pharmacy to assure aware of all possible risks and side effects of medication before taking. age and gender appropriate health screening exams and vaccinations. Advised patient regarding importance of keeping up with recommended health maintenance and to schedule as soon as possible if overdue, as this isimportant in assessing for undiagnosed pathology, especially cancer, as well as protecting against potentially harmful/life threatening disease. Patient and/or guardian verbalizes understanding andagrees with above counseling, assessment and plan. All questions answered. An electronic signature was used to authenticate this note.     --Tyler Damon DO

## 2022-06-28 NOTE — ASSESSMENT & PLAN NOTE
Patient is currently taking prednisone 10 mg daily per Dr. Aleah Figueredo for her steroid-dependent COPD.

## 2022-06-28 NOTE — ASSESSMENT & PLAN NOTE
I added lisinopril 5 mg daily to her antihypertensive regimen which included metoprolol 12.5 mg daily, due to systolic blood pressure today of 176/108.

## 2022-06-28 NOTE — ASSESSMENT & PLAN NOTE
I did add Lexapro 10 mg daily due to her depression anxiety and she did test positive on the depression screen today in the office.   We will see patient back in 1 month for evaluation and effectiveness of treatment

## 2022-06-28 NOTE — PATIENT INSTRUCTIONS
Patient Education        Anxiety Disorder: Care Instructions  Your Care Instructions     Anxiety is a normal reaction to stress. Difficult situations can cause you to have symptoms such as sweaty palms and a nervous feeling. In an anxiety disorder, the symptoms are far more severe. Constant worry, muscle tension, trouble sleeping, nausea and diarrhea, and other symptoms can make normal daily activities difficult or impossible. These symptoms may occur for no reason, and they can affect your work, school, or social life. Medicines, counseling, and self-care can all help. Follow-up care is a key part of your treatment and safety. Be sure to make and go to all appointments, and call your doctor if you are having problems. It's also a good idea to know your test results and keep a list of the medicines you take. How can you care for yourself at home? · Take medicines exactly as directed. Call your doctor if you think you are having a problem with your medicine. · Go to your counseling sessions and follow-up appointments. · Recognize and accept your anxiety. Then, when you are in a situation that makes you anxious, say to yourself, \"This is not an emergency. I feel uncomfortable, but I am not in danger. I can keep going even if I feel anxious. \"  · Be kind to your body:  ? Relieve tension with exercise or a massage. ? Get enough rest.  ? Avoid alcohol, caffeine, nicotine, and illegal drugs. They can increase your anxiety level and cause sleep problems. ? Learn and do relaxation techniques. See below for more about these techniques. · Engage your mind. Get out and do something you enjoy. Go to a funny movie, or take a walk or hike. Plan your day. Having too much or too little to do can make you anxious. · Keep a record of your symptoms. Discuss your fears with a good friend or family member, or join a support group for people with similar problems. Talking to others sometimes relieves stress.   · Get involved in social groups, or volunteer to help others. Being alone sometimes makes things seem worse than they are. · Get at least 30 minutes of exercise on most days of the week to relieve stress. Walking is a good choice. You also may want to do other activities, such as running, swimming, cycling, or playing tennis or team sports. Relaxation techniques  Do relaxation exercises 10 to 20 minutes a day. You can play soothing, relaxing music while you do them, if you wish. · Tell others in your house that you are going to do your relaxation exercises. Ask them not to disturb you. · Find a comfortable place, away from all distractions and noise. · Lie down on your back, or sit with your back straight. · Focus on your breathing. Make it slow and steady. · Breathe in through your nose. Breathe out through either your nose or mouth. · Breathe deeply, filling up the area between your navel and your rib cage. Breathe so that your belly goes up and down. · Do not hold your breath. · Breathe like this for 5 to 10 minutes. Notice the feeling of calmness throughout your whole body. As you continue to breathe slowly and deeply, relax by doing the following for another 5 to 10 minutes:  · Tighten and relax each muscle group in your body. You can begin at your toes and work your way up to your head. · Imagine your muscle groups relaxing and becoming heavy. · Empty your mind of all thoughts. · Let yourself relax more and more deeply. · Become aware of the state of calmness that surrounds you. · When your relaxation time is over, you can bring yourself back to alertness by moving your fingers and toes and then your hands and feet and then stretching and moving your entire body. Sometimes people fall asleep during relaxation, but they usually wake up shortly afterward. · Always give yourself time to return to full alertness before you drive a car or do anything that might cause an accident if you are not fully alert.  Never play a relaxation tape while you drive a car. When should you call for help? Call 911 anytime you think you may need emergency care. For example, call if:    · You feel you cannot stop from hurting yourself or someone else. Keep the numbers for these national suicide hotlines: 5-686-281-TALK (9-878.725.8760) and 1-153-EUNWQHF (8-299.523.2302). If you or someone you know talks about suicide or feeling hopeless, get help right away. Watch closely for changes in your health, and be sure to contact your doctor if:    · You have anxiety or fear that affects your life.     · You have symptoms of anxiety that are new or different from those you had before. Where can you learn more? Go to https://Array BridgepeTri-Medicseb.[email or web address]. org and sign in to your [email or web address] account. Enter P754 in the Impermium box to learn more about \"Anxiety Disorder: Care Instructions. \"     If you do not have an account, please click on the \"Sign Up Now\" link. Current as of: September 23, 2020               Content Version: 12.9  © 2006-2021 [employer]. Care instructions adapted under license by [facility] ([facility]). If you have questions about a medical condition or this instruction, always ask your healthcare professional. [address] any warranty or liability for your use of this information. Patient Education        Chronic Obstructive Pulmonary Disease (COPD): Care Instructions  Overview     Chronic obstructive pulmonary disease (COPD) is a lung disease that makes it hard to breathe. With COPD, the airways that lead to the lungs are narrowed, and the tiny air sacs in the lungs are damaged and lose their stretch. People with COPD have decreased airflow in and out of the lungs, which makes it hard to breathe. The airways also can get clogged with thick mucus. Cigarettesmoking is a major cause of COPD. Although there is no cure for COPD, you can slow its progress.  Following your treatment plan and taking care of yourself can help you feel better and livelonger. Follow-up care is a key part of your treatment and safety. Be sure to make and go to all appointments, and call your doctor if you are having problems. It's also a good idea to know your test results and keep alist of the medicines you take. How can you care for yourself at home? Staying healthy     Do not smoke. This is the most important step you can take to prevent more damage to your lungs. If you need help quitting, talk to your doctor about stop-smoking programs and medicines. These can increase your chances of quitting for good.      Avoid colds and other infections. Get the pneumococcal and whooping cough (pertussis) vaccines. If you have had these vaccines before, ask your doctor if you need another dose. Get the flu vaccine every fall. If you must be around people with colds or the flu, wash your hands often.      Avoid secondhand smoke and air pollution. Try to stay inside with your windows closed when air pollution is bad. Medicines and oxygen therapy     Take your medicines exactly as prescribed. Call your doctor if you think you are having a problem with your medicine. You may be taking medicines such as:  ? Bronchodilators. These help open your airways and make breathing easier. They are either short-acting (work for 4 to 9 hours) or long-acting (work for 12 to 24 hours). You inhale most bronchodilators, so they start to act quickly. Always carry your quick-relief inhaler with you in case you need it. ? Corticosteroids (prednisone, budesonide). These reduce airway inflammation. They come in inhaled or pill form.      Ask your doctor or pharmacist if you are using each type of inhaler correctly. With correct use, the medicine is more likely to get to your lungs.      See if a spacer is right for you. A spacer may also help you get more inhaled medicine to your lungs.  If you use one, ask how to use it properly.      Do not take any vitamins, over-the-counter medicine, or herbal products without talking to your doctor first.      If your doctor prescribed antibiotics, take them as directed. Do not stop taking them just because you feel better. You need to take the full course of antibiotics.      If you use oxygen therapy, use the flow rate your doctor has recommended. Don't change it without talking to your doctor first. Oxygen therapy boosts the amount of oxygen in your blood and helps you breathe easier. Activity     Get regular exercise. Walking is an easy way to get exercise. Start out slowly, and walk a little more each day.      Pay attention to your breathing. You are exercising too hard if you can't talk while you exercise.      Take short rest breaks when doing household chores and other activities.      Learn breathing methods--such as breathing through pursed lips--to help you become less short of breath.      If your doctor has not set you up with a pulmonary rehabilitation program, ask if rehab is right for you. Rehab includes exercise programs, education about your disease and how to manage it, help with diet and other changes, and emotional support. Diet     Eat regular, healthy meals.      Use bronchodilators about 1 hour before you eat to make it easier to eat.      Eat several smaller, frequent meals to prevent getting too full. A full stomach can push on the muscle that helps you breathe (your diaphragm) and make it harder to breathe.      Drink beverages at the end of the meal.      Avoid foods that are hard to chew.      Eat foods that contain protein so you don't lose muscle mass.      Talk with your doctor if you gain too much weight or if you lose weight without trying. Mental health     Talk to your family, friends, or a therapist about your feelings. Some people feel frightened, angry, hopeless, helpless, and even guilty. Talking openly about feelings may help you cope.  If these feelings last, talk to your doctor. When should you call for help? Call 911 anytime you think you may need emergency care. For example, call if:     You have severe trouble breathing.      You are having chest pain that is different or worse than usual.   Call your doctor now or seek immediate medical care if:     You have new or worse trouble breathing.      You cough up blood.      You have a fever.      You have feelings of anxiety or depression. Watch closely for changes in your health, and be sure to contact your doctor if:     You cough more deeply or more often, especially if you notice more mucus or a change in the color of your mucus.      You have new or worse swelling in your legs or belly.      You are not getting better as expected. Where can you learn more? Go to [email or web address]://Jack On Block.[employer] [email or web address]. org and sign in to your [employer] account. Enter P661 in the TrakTek 3D box to learn more about \"Chronic Obstructive Pulmonary Disease (COPD): Care Instructions. \"     If you do not have an account, please click on the \"Sign Up Now\" link. Current as of: March 9, 2022               Content Version: 13.3  © 8120-1972 [email or web address]. Care instructions adapted under license by [facility] ([facility]). If you have questions about a medical condition or this instruction, always ask your healthcare professional. [place] [address] any warranty or liability for your use of this information. Patient Education        Home Blood Pressure Test: About This Test  What is it? A home blood pressure test allows you to keep track of your blood pressure at home. Blood pressure is a measure of the force of blood against the walls of your arteries. Blood pressure readings include two numbers, such as 130/80 (say \"130 over 80\"). The first number is the systolic pressure. The second number isthe diastolic pressure. Why is this test done?   You may do this test at home to:   Find out if you have high blood pressure.  Track your blood pressure if you have high blood pressure.  Track how well medicine is working to reduce high blood pressure.  Check how lifestyle changes, such as weight loss and exercise, are affecting blood pressure. How do you prepare for the test?  For at least 30 minutes before you take your blood pressure, don't exercise, drink caffeine, or smoke. Empty your bladder before the test. Sit quietly with your back straight and both feet on the floor for at least 5 minutes. Thishelps you take your blood pressure while you feel comfortable and relaxed. How is the test done?  If your doctor recommends it, take your blood pressure twice a day. Take it in the morning and evening.  Sit with your arm slightly bent and resting on a table so that your upper arm is at the same level as your heart.  Use the same arm each time you take your blood pressure.  Place the blood pressure cuff on the bare skin of your upper arm. You may have to roll up your sleeve, remove your arm from the sleeve, or take your shirt off.  Wrap the blood pressure cuff around your upper arm so that the lower edge of the cuff is about 1 inch above the bend of your elbow.  Do not move, talk, or text while you take your blood pressure. Follow the instructions that came with your blood pressure monitor. They mightbe different from the following.  Press the on/off button on the automatic monitor. Then you may need to wait until the screen says the monitor is ready.  Press the start button. The cuff will inflate and deflate by itself.  Your blood pressure numbers will appear on the screen.  Wait one minute and take your blood pressure again.  If your monitor does not automatically save your numbers, write them in your log book, along with the date and time. Follow-up care is a key part of your treatment and safety.  Be sure to make and go to all appointments, and call your doctor if you arehaving problems. It's also a good idea to keep a list of the medicines you take. Where can you learn more? Go to https://chpepiceweb.[email or web address] org and sign in to your Armetheon account. Enter C427 in the Movirtu box to learn more about \"Home Blood Pressure Test: About This Test.\"     If you do not have an account, please click on the \"Sign Up Now\" link. Current as of: January 10, 2022               Content Version: 13.3  © 1456-2150 "[employer]. Care instructions adapted under license by [facility] ([facility]). If you have questions about a medical condition or this instruction, always ask your healthcare professional. [address] any warranty or liability for your use of this information. Patient Education        DASH Diet: Care Instructions  Your Care Instructions     The DASH diet is an eating plan that can help lower your blood pressure. DASH stands for Dietary Approaches to Stop Hypertension. Hypertension is high bloodpressure. The DASH diet focuses on eating foods that are high in calcium, potassium, and magnesium. These nutrients can lower blood pressure. The foods that are highest in these nutrients are fruits, vegetables, low-fat dairy products, nuts, seeds, and legumes. But taking calcium, potassium, and magnesium supplements instead of eating foods that are high in those nutrients does not have the same effect. The DASH diet also includes whole grains, fish, and poultry. The DASH diet is one of several lifestyle changes your doctor may recommend to lower your high blood pressure. Your doctor may also want you to decrease the amount of sodium in your diet. Lowering sodium while following the DASH dietcan lower blood pressure even further than just the DASH diet alone. Follow-up care is a key part of your treatment and safety. Be sure to make and go to all appointments, and call your doctor if you are having problems.  It's also a good idea to know your test results and keep alist of the medicines you take. How can you care for yourself at home? Following the DASH diet   Eat 4 to 5 servings of fruit each day. A serving is 1 medium-sized piece of fruit, ½ cup chopped or canned fruit, 1/4 cup dried fruit, or 4 ounces (½ cup) of fruit juice. Choose fruit more often than fruit juice.  Eat 4 to 5 servings of vegetables each day. A serving is 1 cup of lettuce or raw leafy vegetables, ½ cup of chopped or cooked vegetables, or 4 ounces (½ cup) of vegetable juice. Choose vegetables more often than vegetable juice.  Get 2 to 3 servings of low-fat and fat-free dairy each day. A serving is 8 ounces of milk, 1 cup of yogurt, or 1 ½ ounces of cheese.  Eat 6 to 8 servings of grains each day. A serving is 1 slice of bread, 1 ounce of dry cereal, or ½ cup of cooked rice, pasta, or cooked cereal. Try to choose whole-grain products as much as possible.  Limit lean meat, poultry, and fish to 2 servings each day. A serving is 3 ounces, about the size of a deck of cards.  Eat 4 to 5 servings of nuts, seeds, and legumes (cooked dried beans, lentils, and split peas) each week. A serving is 1/3 cup of nuts, 2 tablespoons of seeds, or ½ cup of cooked beans or peas.  Limit fats and oils to 2 to 3 servings each day. A serving is 1 teaspoon of vegetable oil or 2 tablespoons of salad dressing.  Limit sweets and added sugars to 5 servings or less a week. A serving is 1 tablespoon jelly or jam, ½ cup sorbet, or 1 cup of lemonade.  Eat less than 2,300 milligrams (mg) of sodium a day. If you limit your sodium to 1,500 mg a day, you can lower your blood pressure even more.  Be aware that all of these are the suggested number of servings for people who eat 1,800 to 2,000 calories a day. Your recommended number of servings may be different if you need more or fewer calories. Tips for success   Start small.  Do not try to make dramatic changes to your diet all at once. You might feel that you are missing out on your favorite foods and then be more likely to not follow the plan. Make small changes, and stick with them. Once those changes become habit, add a few more changes.  Try some of the following:  ? Make it a goal to eat a fruit or vegetable at every meal and at snacks. This will make it easy to get the recommended amount of fruits and vegetables each day. ? Try yogurt topped with fruit and nuts for a snack or healthy dessert. ? Add lettuce, tomato, cucumber, and onion to sandwiches. ? Combine a ready-made pizza crust with low-fat mozzarella cheese and lots of vegetable toppings. Try using tomatoes, squash, spinach, broccoli, carrots, cauliflower, and onions. ? Have a variety of cut-up vegetables with a low-fat dip as an appetizer instead of chips and dip. ? Sprinkle sunflower seeds or chopped almonds over salads. Or try adding chopped walnuts or almonds to cooked vegetables. ? Try some vegetarian meals using beans and peas. Add garbanzo or kidney beans to salads. Make burritos and tacos with mashed jung beans or black beans. Where can you learn more? Go to https://[email or web address] Computing.[email or web address] org and sign in to your BloomThat account. Enter V504 in the [facility] box to learn more about \"DASH Diet: Care Instructions. \"     If you do not have an account, please click on the \"Sign Up Now\" link. Current as of: January 10, 2022               Content Version: 13.3  © 0619-2186 [employer]ilson. Care instructions adapted under license by [facility] ([facility]). If you have questions about a medical condition or this instruction, always ask your healthcare professional. Nadeen Santoro any warranty or liability for your use of this information. Patient Education        High Blood Pressure: Care Instructions  Overview     It's normal for blood pressure to go up and down throughout the day.  But if it stays up, you have high blood pressure. Another name for high blood pressure ishypertension. Despite what a lot of people think, high blood pressure usually doesn't cause headaches or make you feel dizzy or lightheaded. It usually has no symptoms. But it does increase your risk of stroke, heart attack, and other problems. You and your doctor will talk about your risks of these problems based on yourblood pressure. Your doctor will give you a goal for your blood pressure. Your goal will bebased on your health and your age. Lifestyle changes, such as eating healthy and being active, are always important to help lower blood pressure. You might also take medicine to reachyour blood pressure goal.  Follow-up care is a key part of your treatment and safety. Be sure to make and go to all appointments, and call your doctor if you are having problems. It's also a good idea to know your test results and keep alist of the medicines you take. How can you care for yourself at home? Medical treatment   If you stop taking your medicine, your blood pressure will go back up. You may take one or more types of medicine to lower your blood pressure. Be safe with medicines. Take your medicine exactly as prescribed. Call your doctor if you think you are having a problem with your medicine.  Talk to your doctor before you start taking aspirin every day. Aspirin can help certain people lower their risk of a heart attack or stroke. But taking aspirin isn't right for everyone, because it can cause serious bleeding.  See your doctor regularly. You may need to see the doctor more often at first or until your blood pressure comes down.  If you are taking blood pressure medicine, talk to your doctor before you take decongestants or anti-inflammatory medicine, such as ibuprofen. Some of these medicines can raise blood pressure.  Learn how to check your blood pressure at home. Lifestyle changes   Stay at a healthy weight.  This is especially important if you put on weight around the waist. Losing even 10 pounds can help you lower your blood pressure.  If your doctor recommends it, get more exercise. Walking is a good choice. Bit by bit, increase the amount you walk every day. Try for at least 30 minutes on most days of the week. You also may want to swim, bike, or do other activities.  Avoid or limit alcohol. Talk to your doctor about whether you can drink any alcohol.  Try to limit how much sodium you eat to less than 2,300 milligrams (mg) a day. Your doctor may ask you to try to eat less than 1,500 mg a day.  Eat plenty of fruits (such as bananas and oranges), vegetables, legumes, whole grains, and low-fat dairy products.  Lower the amount of saturated fat in your diet. Saturated fat is found in animal products such as milk, cheese, and meat. Limiting these foods may help you lose weight and also lower your risk for heart disease.  Do not smoke. Smoking increases your risk for heart attack and stroke. If you need help quitting, talk to your doctor about stop-smoking programs and medicines. These can increase your chances of quitting for good. When should you call for help? Call 911  anytime you think you may need emergency care. This may mean having symptoms that suggest that your blood pressure is causing a serious heart or blood vessel problem. Your blood pressure may be over 180/120. For example, call 911 if:     You have symptoms of a heart attack. These may include:  ? Chest pain or pressure, or a strange feeling in the chest.  ? Sweating. ? Shortness of breath. ? Nausea or vomiting. ? Pain, pressure, or a strange feeling in the back, neck, jaw, or upper belly or in one or both shoulders or arms. ? Lightheadedness or sudden weakness. ? A fast or irregular heartbeat.      You have symptoms of a stroke.  These may include:  ? Sudden numbness, tingling, weakness, or loss of movement in your face, arm, or leg, especially on only one side

## 2022-08-15 ENCOUNTER — TELEPHONE (OUTPATIENT)
Dept: OTHER | Facility: CLINIC | Age: 52
End: 2022-08-15

## 2022-09-20 DIAGNOSIS — I10 ESSENTIAL HYPERTENSION: ICD-10-CM

## 2022-10-15 ENCOUNTER — HOSPITAL ENCOUNTER (OUTPATIENT)
Dept: CT IMAGING | Age: 52
Discharge: HOME | End: 2022-10-17
Payer: COMMERCIAL

## 2022-10-15 DIAGNOSIS — R05.3 CHRONIC COUGH: ICD-10-CM

## 2022-10-15 PROCEDURE — 71250 CT THORAX DX C-: CPT

## 2022-12-08 ENCOUNTER — OFFICE VISIT (OUTPATIENT)
Dept: FAMILY MEDICINE CLINIC | Age: 52
End: 2022-12-08
Payer: COMMERCIAL

## 2022-12-08 VITALS
DIASTOLIC BLOOD PRESSURE: 84 MMHG | HEIGHT: 60 IN | HEART RATE: 96 BPM | WEIGHT: 188 LBS | TEMPERATURE: 98 F | OXYGEN SATURATION: 98 % | BODY MASS INDEX: 36.91 KG/M2 | RESPIRATION RATE: 18 BRPM | SYSTOLIC BLOOD PRESSURE: 142 MMHG

## 2022-12-08 DIAGNOSIS — F41.9 ANXIETY: Chronic | ICD-10-CM

## 2022-12-08 DIAGNOSIS — I10 ESSENTIAL HYPERTENSION: ICD-10-CM

## 2022-12-08 DIAGNOSIS — S29.011A INTERCOSTAL MUSCLE STRAIN, INITIAL ENCOUNTER: ICD-10-CM

## 2022-12-08 DIAGNOSIS — L98.9 SKIN LESION: Primary | ICD-10-CM

## 2022-12-08 PROCEDURE — 3078F DIAST BP <80 MM HG: CPT | Performed by: STUDENT IN AN ORGANIZED HEALTH CARE EDUCATION/TRAINING PROGRAM

## 2022-12-08 PROCEDURE — 3074F SYST BP LT 130 MM HG: CPT | Performed by: STUDENT IN AN ORGANIZED HEALTH CARE EDUCATION/TRAINING PROGRAM

## 2022-12-08 PROCEDURE — 99214 OFFICE O/P EST MOD 30 MIN: CPT | Performed by: STUDENT IN AN ORGANIZED HEALTH CARE EDUCATION/TRAINING PROGRAM

## 2022-12-08 RX ORDER — LISINOPRIL 10 MG/1
10 TABLET ORAL DAILY
Qty: 90 TABLET | Refills: 1 | Status: SHIPPED | OUTPATIENT
Start: 2022-12-08 | End: 2023-06-06

## 2022-12-08 RX ORDER — KETOCONAZOLE 20 MG/ML
SHAMPOO TOPICAL
Qty: 120 ML | Refills: 3 | Status: SHIPPED | OUTPATIENT
Start: 2022-12-08

## 2022-12-08 RX ORDER — HYDROXYZINE HYDROCHLORIDE 25 MG/1
25 TABLET, FILM COATED ORAL EVERY 8 HOURS PRN
Qty: 30 TABLET | Refills: 0 | Status: SHIPPED | OUTPATIENT
Start: 2022-12-08 | End: 2023-01-07

## 2022-12-08 RX ORDER — PREDNISONE 10 MG/1
TABLET ORAL
COMMUNITY
Start: 2022-11-21

## 2022-12-08 SDOH — ECONOMIC STABILITY: FOOD INSECURITY: WITHIN THE PAST 12 MONTHS, YOU WORRIED THAT YOUR FOOD WOULD RUN OUT BEFORE YOU GOT MONEY TO BUY MORE.: SOMETIMES TRUE

## 2022-12-08 SDOH — ECONOMIC STABILITY: FOOD INSECURITY: WITHIN THE PAST 12 MONTHS, THE FOOD YOU BOUGHT JUST DIDN'T LAST AND YOU DIDN'T HAVE MONEY TO GET MORE.: SOMETIMES TRUE

## 2022-12-08 ASSESSMENT — SOCIAL DETERMINANTS OF HEALTH (SDOH): HOW HARD IS IT FOR YOU TO PAY FOR THE VERY BASICS LIKE FOOD, HOUSING, MEDICAL CARE, AND HEATING?: SOMEWHAT HARD

## 2022-12-08 NOTE — PROGRESS NOTES
Patient:  Jeanette Valverde 47 y.o. female     12/08/22      Chiefcomplaint:   Chief Complaint   Patient presents with    Establish Care     Patient states she presents to establish care with the provider. States she wishes to discuss a rash with the provider as well as back pain she has been experiencing for roughly 3 months. Problems and Overview     Skin lesion present about 1 month. Does not currently see Derm.   Skin rash  Anxiety did not start Lexapro  Muscle pain in back worse with coughing    Patient Active Problem List    Diagnosis Date Noted    Essential hypertension 06/28/2022     Priority: Medium    Psoriatic arthritis ([facility] [address] 09/22/2021    Irritable bowel syndrome     Status post total knee replacement, right 07/12/2021    Steroid-dependent COPD ([facility] [address] 02/14/2011    Hyperlipidemia 02/14/2011    Plantar fasciitis 02/14/2011    Anxiety     Chronic back pain     Psoriasis     GERD (gastroesophageal reflux disease)      Hiatal hernia      Asthma     Obesity       Prevention:  Health Maintenance Due   Topic Date Due    HIV screen  Never done    Cervical cancer screen  Never done    Colorectal Cancer Screen  Never done    Breast cancer screen  Never done    Shingles vaccine (1 of 2) Never done    Low dose CT lung screening  Never done    Pneumococcal 0-64 years Vaccine (2 - PCV) 09/03/2021    COVID-19 Vaccine (4 - Booster for Moderna series) 05/26/2022    Flu vaccine (1) 08/01/2022      Meds prior:  Current Outpatient Medications   Medication Instructions    albuterol (PROVENTIL HFA;VENTOLIN HFA) 108 (90 BASE) MCG/ACT inhaler 2 puffs, Inhalation, EVERY 6 HOURS PRN    albuterol (PROVENTIL) 2.5 mg, Nebulization, EVERY 4-6 HOURS PRN    fluticasone-umeclidin-vilant (TRELEGY ELLIPTA) 100-62.5-25 MCG/INH AEPB 1 puff, Inhalation, DAILY    guaiFENesin 1200 MG TB12 1 tablet, Oral, 2 TIMES DAILY    hydrOXYzine HCl (ATARAX) 25 mg, Oral, EVERY 8 HOURS PRN    ketoconazole (NIZORAL) 2 % shampoo Apply topically daily as needed. lisinopril (PRINIVIL;ZESTRIL) 10 mg, Oral, DAILY    loratadine (CLARITIN) 10 mg, Oral, DAILY    metoprolol tartrate (LOPRESSOR) 12.5 mg, Oral, 2 TIMES DAILY    omeprazole (PRILOSEC) 40 mg, Oral, DAILY    OXYGEN 4 L/min, Inhalation, CONTINUOUS    predniSONE (DELTASONE) 10 MG tablet No dose, route, or frequency recorded. Triamcinolone Acetonide (NASACORT ALLERGY 24HR NA) 2 sprays, Nasal, DAILY      Physical Exam   Vitals: BP (!) 142/84   Pulse 96   Temp 98 °F (36.7 °C) (Temporal)   Resp 18   Ht 5' (1.524 m)   Wt 188 lb (85.3 kg)   LMP 07/01/2021   SpO2 98%   BMI 36.72 kg/m²   General Appearance: Alert, oriented, no acute distress  HEENT: No scleral icterus. No visible discharge from eyes  Neck: Not rigid. No visible masses  Chest wall/Lung: Decreased breath sounds bilaterally. Mild right anterior wheeze. Heart: RRR, no murmur  Abdomen: Tender posterior left thoracic paraspinals. No swelling or rash. Extremities:  No edema  Skin: Possible fungal rash. Upper thoracic and into the neck. Left upper thoracic 2 cm oval pearly and pink macular area without telangiectasia. No jaundice  Neuro: Alert and oriented        Psych: Appropriate mood and appropriate affect  Assessment and Plan   Patient with skin lesion on the left upper anterior thorax potentially consistent with basal cell skin cancer we will have her see dermatology in near future. We do not have lidocaine to perform biopsy. Referral placed today. Patient with hypertension on lisinopril 5 mg will increase to 10 mg for elevated blood pressure today. Patient with muscle strain left mid thorax. Plan for Voltaren gel. Patient with anxiety especially when her breathing gets bad. We will try hydroxyzine. Skin lesion  -     Tammy Rojas MD,  Dermatology, [facility] ([facility])  Essential hypertension  Anxiety  Intercostal muscle strain, initial encounter    No follow-ups on file.     Luis Alberto Andrews, DO     This document may have been prepared at least partially through the use of voice recognition software. Although effort is taken to assure the accuracy of this document, it is possible that grammatical, syntax,  or spelling errors may occur.

## 2022-12-09 DIAGNOSIS — L98.9 SKIN LESION: Primary | ICD-10-CM

## 2022-12-30 DIAGNOSIS — I10 ESSENTIAL HYPERTENSION: ICD-10-CM

## 2023-01-11 ENCOUNTER — TELEPHONE (OUTPATIENT)
Dept: ADMINISTRATIVE | Age: 53
End: 2023-01-11

## 2023-01-11 NOTE — TELEPHONE ENCOUNTER
NP scheduled from the [facility]. Patient Appointment Form:      PCP: Dr. Luz Marina Luna  Referring: Dr. Johana Ferrer    Has the Patient:    Seen a Cardiologist? Yes 2013 [facility] pt     Had a heart catheterization? Yes 4/17/2013 [facility]     Had heart surgery? no    Had a stress test or nuclear stress test? Yes 2013 Epic    Had an echocardiogram? Yes 2013 Epic     Had a vascular ultrasound? no    Had a 24/48 heart monitor or extended cardiac event monitor? no    Had recent blood work in the last 6 months?      Had a pacemaker/ICD/ILR implant? no    Seen an Electrophysiologist? no        Will send records via: Prior 2013 [facility] recsin Epic - Ref phys recs in Media      Date & time of appointment:  2/1/23 @ 2:00 with Dr. Tatyana Middleton

## 2023-01-18 ENCOUNTER — COMMUNITY OUTREACH (OUTPATIENT)
Dept: FAMILY MEDICINE CLINIC | Age: 53
End: 2023-01-18

## 2023-01-19 ENCOUNTER — TELEPHONE (OUTPATIENT)
Dept: FAMILY MEDICINE CLINIC | Age: 53
End: 2023-01-19

## 2023-01-19 NOTE — TELEPHONE ENCOUNTER
[facility] office calling just to let you know that pt had an appt with them but she cancelled and did not r/s.   FYI

## 2023-04-03 DIAGNOSIS — I10 ESSENTIAL HYPERTENSION: ICD-10-CM

## 2023-04-03 RX ORDER — LISINOPRIL 10 MG/1
10 TABLET ORAL DAILY
Qty: 90 TABLET | Refills: 1 | OUTPATIENT
Start: 2023-04-03 | End: 2023-09-30

## 2023-07-03 DIAGNOSIS — I10 ESSENTIAL HYPERTENSION: ICD-10-CM

## 2023-07-05 RX ORDER — LISINOPRIL 10 MG/1
10 TABLET ORAL DAILY
Qty: 90 TABLET | Refills: 1 | Status: SHIPPED | OUTPATIENT
Start: 2023-07-05 | End: 2024-01-01

## 2023-09-27 ENCOUNTER — OFFICE VISIT (OUTPATIENT)
Dept: FAMILY MEDICINE CLINIC | Age: 53
End: 2023-09-27
Payer: COMMERCIAL

## 2023-09-27 VITALS
HEART RATE: 116 BPM | TEMPERATURE: 97.2 F | RESPIRATION RATE: 20 BRPM | WEIGHT: 192.2 LBS | DIASTOLIC BLOOD PRESSURE: 80 MMHG | OXYGEN SATURATION: 98 % | HEIGHT: 60 IN | SYSTOLIC BLOOD PRESSURE: 136 MMHG | BODY MASS INDEX: 37.73 KG/M2

## 2023-09-27 DIAGNOSIS — I10 ESSENTIAL HYPERTENSION: ICD-10-CM

## 2023-09-27 DIAGNOSIS — J44.9 STEROID-DEPENDENT COPD (HCC): Primary | ICD-10-CM

## 2023-09-27 DIAGNOSIS — G89.29 CHRONIC BACK PAIN, UNSPECIFIED BACK LOCATION, UNSPECIFIED BACK PAIN LATERALITY: ICD-10-CM

## 2023-09-27 DIAGNOSIS — Z23 NEED FOR IMMUNIZATION AGAINST INFLUENZA: ICD-10-CM

## 2023-09-27 DIAGNOSIS — R73.01 IMPAIRED FASTING GLUCOSE: ICD-10-CM

## 2023-09-27 DIAGNOSIS — M54.9 CHRONIC BACK PAIN, UNSPECIFIED BACK LOCATION, UNSPECIFIED BACK PAIN LATERALITY: ICD-10-CM

## 2023-09-27 DIAGNOSIS — Z92.241 STEROID-DEPENDENT COPD (HCC): Primary | ICD-10-CM

## 2023-09-27 PROCEDURE — 90674 CCIIV4 VAC NO PRSV 0.5 ML IM: CPT | Performed by: STUDENT IN AN ORGANIZED HEALTH CARE EDUCATION/TRAINING PROGRAM

## 2023-09-27 PROCEDURE — 90471 IMMUNIZATION ADMIN: CPT | Performed by: STUDENT IN AN ORGANIZED HEALTH CARE EDUCATION/TRAINING PROGRAM

## 2023-09-27 PROCEDURE — 99214 OFFICE O/P EST MOD 30 MIN: CPT | Performed by: STUDENT IN AN ORGANIZED HEALTH CARE EDUCATION/TRAINING PROGRAM

## 2023-09-27 PROCEDURE — 3075F SYST BP GE 130 - 139MM HG: CPT | Performed by: STUDENT IN AN ORGANIZED HEALTH CARE EDUCATION/TRAINING PROGRAM

## 2023-09-27 PROCEDURE — 3079F DIAST BP 80-89 MM HG: CPT | Performed by: STUDENT IN AN ORGANIZED HEALTH CARE EDUCATION/TRAINING PROGRAM

## 2023-09-27 SDOH — ECONOMIC STABILITY: HOUSING INSECURITY
IN THE LAST 12 MONTHS, WAS THERE A TIME WHEN YOU DID NOT HAVE A STEADY PLACE TO SLEEP OR SLEPT IN A SHELTER (INCLUDING NOW)?: NO

## 2023-09-27 SDOH — ECONOMIC STABILITY: INCOME INSECURITY: HOW HARD IS IT FOR YOU TO PAY FOR THE VERY BASICS LIKE FOOD, HOUSING, MEDICAL CARE, AND HEATING?: NOT HARD AT ALL

## 2023-09-27 SDOH — ECONOMIC STABILITY: FOOD INSECURITY: WITHIN THE PAST 12 MONTHS, YOU WORRIED THAT YOUR FOOD WOULD RUN OUT BEFORE YOU GOT MONEY TO BUY MORE.: NEVER TRUE

## 2023-09-27 SDOH — ECONOMIC STABILITY: FOOD INSECURITY: WITHIN THE PAST 12 MONTHS, THE FOOD YOU BOUGHT JUST DIDN'T LAST AND YOU DIDN'T HAVE MONEY TO GET MORE.: NEVER TRUE

## 2023-09-27 ASSESSMENT — PATIENT HEALTH QUESTIONNAIRE - PHQ9
1. LITTLE INTEREST OR PLEASURE IN DOING THINGS: 0
SUM OF ALL RESPONSES TO PHQ QUESTIONS 1-9: 0
SUM OF ALL RESPONSES TO PHQ9 QUESTIONS 1 & 2: 0
2. FEELING DOWN, DEPRESSED OR HOPELESS: 0
SUM OF ALL RESPONSES TO PHQ QUESTIONS 1-9: 0

## 2023-09-27 NOTE — PROGRESS NOTES
Patient:  Sylvia Bazzi 46 y.o. female     09/27/23      Chiefcomplaint:   Chief Complaint   Patient presents with    Weight Gain    Pain     Problems and Overview     Dry mouth and thirst and increased urination - wondering about diabetes  Prednisone dependent  Prior a1c  Hemoglobin A1C   Date Value Ref Range Status   06/28/2022 6.4 % Final     Concern for weight gain   Wt Readings from Last 3 Encounters:   09/27/23 192 lb 3.2 oz (87.2 kg)   12/08/22 188 lb (85.3 kg)   06/28/22 178 lb (80.7 kg)     Pain  Shin, knees, back - says she needed fusion in past but couldn't do it bc she was smoking. Previously saw neurosurg. Had injections in past. Pain right now is radicular down into feet with numbness in toes that is intermittent. No weakness, no bowel or bladder changes.     COPD - says she was going to be considered for double lung transplant but weight was a concern - last dexa was normal in 2021    Patient Active Problem List    Diagnosis Date Noted    Essential hypertension 06/28/2022     Priority: Medium    Psoriatic arthritis ([address]) 09/22/2021    Irritable bowel syndrome     Status post total knee replacement, right 07/12/2021    Steroid-dependent COPD ([address]) 02/14/2011    Hyperlipidemia 02/14/2011    Plantar fasciitis 02/14/2011    Anxiety     Chronic back pain     Psoriasis     GERD (gastroesophageal reflux disease)      Hiatal hernia      Asthma     Obesity       Prevention:  Health Maintenance Due   Topic Date Due    Hepatitis B vaccine (1 of 3 - 3-dose series) Never done    HIV screen  Never done    Cervical cancer screen  Never done    Colorectal Cancer Screen  Never done    Breast cancer screen  Never done    Shingles vaccine (1 of 2) Never done    Low dose CT lung screening &/or counseling  Never done    Pneumococcal 0-64 years Vaccine (2 - PCV) 09/03/2021    COVID-19 Vaccine (4 - Moderna series) 05/26/2022    A1C test (Diabetic or Prediabetic)  06/28/2023    Depression Screen  06/28/2023    Flu vaccine

## 2023-10-07 ENCOUNTER — HOSPITAL ENCOUNTER (OUTPATIENT)
Age: 53
Discharge: HOME | End: 2023-10-07
Payer: COMMERCIAL

## 2023-10-07 DIAGNOSIS — J44.9 STEROID-DEPENDENT COPD (HCC): ICD-10-CM

## 2023-10-07 DIAGNOSIS — I10 ESSENTIAL HYPERTENSION: ICD-10-CM

## 2023-10-07 DIAGNOSIS — R73.01 IMPAIRED FASTING GLUCOSE: ICD-10-CM

## 2023-10-07 DIAGNOSIS — Z92.241 STEROID-DEPENDENT COPD (HCC): ICD-10-CM

## 2023-10-07 LAB
ALBUMIN SERPL-MCNC: 4.7 G/DL (ref 3.5–5.2)
ALP SERPL-CCNC: 102 U/L (ref 35–104)
ALT SERPL-CCNC: 41 U/L (ref 0–32)
ANION GAP SERPL CALCULATED.3IONS-SCNC: 11 MMOL/L (ref 7–16)
AST SERPL-CCNC: 30 U/L (ref 0–31)
BASOPHILS # BLD: 0.04 K/UL (ref 0–0.2)
BASOPHILS NFR BLD: 0 % (ref 0–2)
BILIRUB SERPL-MCNC: 0.4 MG/DL (ref 0–1.2)
BUN SERPL-MCNC: 7 MG/DL (ref 6–20)
CALCIUM SERPL-MCNC: 10 MG/DL (ref 8.6–10.2)
CHLORIDE SERPL-SCNC: 94 MMOL/L (ref 98–107)
CHOLEST SERPL-MCNC: 266 MG/DL
CO2 SERPL-SCNC: 31 MMOL/L (ref 22–29)
CREAT SERPL-MCNC: 0.6 MG/DL (ref 0.5–1)
EOSINOPHIL # BLD: 0.1 K/UL (ref 0.05–0.5)
EOSINOPHILS RELATIVE PERCENT: 1 % (ref 0–6)
ERYTHROCYTE [DISTWIDTH] IN BLOOD BY AUTOMATED COUNT: 11.7 % (ref 11.5–15)
GFR SERPL CREATININE-BSD FRML MDRD: >60 ML/MIN/1.73M2
GLUCOSE SERPL-MCNC: 340 MG/DL (ref 74–99)
HBA1C MFR BLD: 11.1 % (ref 4–5.6)
HCT VFR BLD AUTO: 45.1 % (ref 34–48)
HDLC SERPL-MCNC: 70 MG/DL
HGB BLD-MCNC: 14.7 G/DL (ref 11.5–15.5)
IMM GRANULOCYTES # BLD AUTO: 0.05 K/UL (ref 0–0.58)
IMM GRANULOCYTES NFR BLD: 1 % (ref 0–5)
LDLC SERPL CALC-MCNC: 158 MG/DL
LYMPHOCYTES NFR BLD: 2.17 K/UL (ref 1.5–4)
LYMPHOCYTES RELATIVE PERCENT: 20 % (ref 20–42)
MCH RBC QN AUTO: 30.9 PG (ref 26–35)
MCHC RBC AUTO-ENTMCNC: 32.6 G/DL (ref 32–34.5)
MCV RBC AUTO: 94.9 FL (ref 80–99.9)
MONOCYTES NFR BLD: 0.52 K/UL (ref 0.1–0.95)
MONOCYTES NFR BLD: 5 % (ref 2–12)
NEUTROPHILS NFR BLD: 73 % (ref 43–80)
NEUTS SEG NFR BLD: 7.93 K/UL (ref 1.8–7.3)
PLATELET # BLD AUTO: 267 K/UL (ref 130–450)
PMV BLD AUTO: 9.9 FL (ref 7–12)
POTASSIUM SERPL-SCNC: 3.9 MMOL/L (ref 3.5–5)
PROT SERPL-MCNC: 8.4 G/DL (ref 6.4–8.3)
RBC # BLD AUTO: 4.75 M/UL (ref 3.5–5.5)
SODIUM SERPL-SCNC: 136 MMOL/L (ref 132–146)
TRIGL SERPL-MCNC: 190 MG/DL
VLDLC SERPL CALC-MCNC: 38 MG/DL
WBC OTHER # BLD: 10.8 K/UL (ref 4.5–11.5)

## 2023-10-07 PROCEDURE — 83036 HEMOGLOBIN GLYCOSYLATED A1C: CPT

## 2023-10-07 PROCEDURE — 80061 LIPID PANEL: CPT

## 2023-10-07 PROCEDURE — 80053 COMPREHEN METABOLIC PANEL: CPT

## 2023-10-07 PROCEDURE — 85025 COMPLETE CBC W/AUTO DIFF WBC: CPT

## 2023-10-07 PROCEDURE — 36415 COLL VENOUS BLD VENIPUNCTURE: CPT

## 2023-10-10 ENCOUNTER — TELEPHONE (OUTPATIENT)
Dept: FAMILY MEDICINE CLINIC | Age: 53
End: 2023-10-10

## 2023-10-10 NOTE — TELEPHONE ENCOUNTER
Pt had labs done over the weekend [facility]. She would like to have them explained to her.

## 2023-10-12 ENCOUNTER — OFFICE VISIT (OUTPATIENT)
Dept: FAMILY MEDICINE CLINIC | Age: 53
End: 2023-10-12
Payer: COMMERCIAL

## 2023-10-12 VITALS
TEMPERATURE: 96.8 F | OXYGEN SATURATION: 99 % | DIASTOLIC BLOOD PRESSURE: 72 MMHG | SYSTOLIC BLOOD PRESSURE: 127 MMHG | RESPIRATION RATE: 22 BRPM | WEIGHT: 192.6 LBS | HEIGHT: 60 IN | BODY MASS INDEX: 37.81 KG/M2 | HEART RATE: 100 BPM

## 2023-10-12 DIAGNOSIS — E78.2 MIXED HYPERLIPIDEMIA: ICD-10-CM

## 2023-10-12 DIAGNOSIS — E09.9 STEROID-INDUCED DIABETES MELLITUS, INITIAL ENCOUNTER (HCC): Primary | ICD-10-CM

## 2023-10-12 DIAGNOSIS — T38.0X5A STEROID-INDUCED DIABETES MELLITUS, INITIAL ENCOUNTER (HCC): Primary | ICD-10-CM

## 2023-10-12 PROCEDURE — 99214 OFFICE O/P EST MOD 30 MIN: CPT | Performed by: STUDENT IN AN ORGANIZED HEALTH CARE EDUCATION/TRAINING PROGRAM

## 2023-10-12 PROCEDURE — 3074F SYST BP LT 130 MM HG: CPT | Performed by: STUDENT IN AN ORGANIZED HEALTH CARE EDUCATION/TRAINING PROGRAM

## 2023-10-12 PROCEDURE — 3078F DIAST BP <80 MM HG: CPT | Performed by: STUDENT IN AN ORGANIZED HEALTH CARE EDUCATION/TRAINING PROGRAM

## 2023-10-12 RX ORDER — LANCETS 30 GAUGE
1 EACH MISCELLANEOUS DAILY
Qty: 200 EACH | Refills: 5 | Status: SHIPPED | OUTPATIENT
Start: 2023-10-12

## 2023-10-12 RX ORDER — GLUCOSAMINE HCL/CHONDROITIN SU 500-400 MG
1 CAPSULE ORAL DAILY
Qty: 300 STRIP | Refills: 5 | Status: SHIPPED | OUTPATIENT
Start: 2023-10-12

## 2023-10-12 RX ORDER — BLOOD-GLUCOSE METER
1 KIT MISCELLANEOUS DAILY
Qty: 1 KIT | Refills: 0 | Status: SHIPPED | OUTPATIENT
Start: 2023-10-12

## 2023-10-12 RX ORDER — ROSUVASTATIN CALCIUM 5 MG/1
5 TABLET, COATED ORAL DAILY
Qty: 90 TABLET | Refills: 1 | Status: SHIPPED | OUTPATIENT
Start: 2023-10-12

## 2023-10-12 RX ORDER — INSULIN GLARGINE 100 [IU]/ML
10 INJECTION, SOLUTION SUBCUTANEOUS NIGHTLY
Qty: 5 ADJUSTABLE DOSE PRE-FILLED PEN SYRINGE | Refills: 0 | Status: SHIPPED | OUTPATIENT
Start: 2023-10-12

## 2023-10-12 NOTE — PROGRESS NOTES
Patient:  Kai Peguero 46 y.o. female     10/12/23      Chiefcomplaint:   Chief Complaint   Patient presents with    Discuss Labs     Problems and Overview       Prednisone dependent copd. Now with elevated A1c    Hemoglobin A1C   Date Value Ref Range Status   10/07/2023 11.1 (H) 4.0 - 5.6 % Final       Patient Active Problem List    Diagnosis Date Noted    Essential hypertension 06/28/2022     Priority: Medium    Psoriatic arthritis ([address]) 09/22/2021    Irritable bowel syndrome     Status post total knee replacement, right 07/12/2021    Steroid-dependent COPD ([address]) 02/14/2011    Hyperlipidemia 02/14/2011    Plantar fasciitis 02/14/2011    Anxiety     Chronic back pain     Psoriasis     GERD (gastroesophageal reflux disease)      Hiatal hernia      Asthma     Obesity       Prevention:  Health Maintenance Due   Topic Date Due    Hepatitis B vaccine (1 of 3 - 3-dose series) Never done    HIV screen  Never done    Cervical cancer screen  Never done    Colorectal Cancer Screen  Never done    Breast cancer screen  Never done    Shingles vaccine (1 of 2) Never done    Low dose CT lung screening &/or counseling  Never done    Pneumococcal 0-64 years Vaccine (2 - PCV) 09/03/2021    COVID-19 Vaccine (4 - Moderna series) 05/26/2022      Meds prior:  Current Outpatient Medications   Medication Instructions    albuterol (PROVENTIL HFA;VENTOLIN HFA) 108 (90 BASE) MCG/ACT inhaler 2 puffs, Inhalation, EVERY 6 HOURS PRN    albuterol (PROVENTIL) 2.5 mg, Nebulization, EVERY 4-6 HOURS PRN    Basaglar KwikPen 10 Units, SubCUTAneous, NIGHTLY    blood glucose monitor strips 1 strip, Other, DAILY, Test one time a day & as needed for symptoms of irregular blood glucose.     fluticasone-umeclidin-vilant (TRELEGY ELLIPTA) 100-62.5-25 MCG/INH AEPB 1 puff, Inhalation, DAILY    glucose monitoring kit 1 kit, Does not apply, DAILY    guaiFENesin 1200 MG TB12 1 tablet, Oral, 2 TIMES DAILY    ketoconazole (NIZORAL) 2 % shampoo Apply topically daily

## 2023-10-19 DIAGNOSIS — I10 ESSENTIAL HYPERTENSION: ICD-10-CM

## 2023-10-20 NOTE — TELEPHONE ENCOUNTER
Last Appointment:  10/12/2023  Future Appointments   Date Time Provider [address]   10/26/2023  3:00 PM DO Estefania Avery [facility]   3/19/2024  3:00 PM YANI Márquez [facility]

## 2023-10-26 ENCOUNTER — OFFICE VISIT (OUTPATIENT)
Dept: FAMILY MEDICINE CLINIC | Age: 53
End: 2023-10-26
Payer: COMMERCIAL

## 2023-10-26 VITALS
OXYGEN SATURATION: 98 % | RESPIRATION RATE: 18 BRPM | WEIGHT: 186.8 LBS | BODY MASS INDEX: 36.67 KG/M2 | HEIGHT: 60 IN | TEMPERATURE: 97.5 F | DIASTOLIC BLOOD PRESSURE: 72 MMHG | SYSTOLIC BLOOD PRESSURE: 131 MMHG | HEART RATE: 107 BPM

## 2023-10-26 DIAGNOSIS — E09.9 STEROID-INDUCED DIABETES MELLITUS, INITIAL ENCOUNTER (HCC): ICD-10-CM

## 2023-10-26 DIAGNOSIS — T38.0X5A STEROID-INDUCED DIABETES MELLITUS, INITIAL ENCOUNTER (HCC): ICD-10-CM

## 2023-10-26 LAB
CHP ED QC CHECK: NORMAL
GLUCOSE BLD-MCNC: 332 MG/DL

## 2023-10-26 PROCEDURE — 99214 OFFICE O/P EST MOD 30 MIN: CPT | Performed by: STUDENT IN AN ORGANIZED HEALTH CARE EDUCATION/TRAINING PROGRAM

## 2023-10-26 PROCEDURE — 82962 GLUCOSE BLOOD TEST: CPT | Performed by: STUDENT IN AN ORGANIZED HEALTH CARE EDUCATION/TRAINING PROGRAM

## 2023-10-26 PROCEDURE — 3078F DIAST BP <80 MM HG: CPT | Performed by: STUDENT IN AN ORGANIZED HEALTH CARE EDUCATION/TRAINING PROGRAM

## 2023-10-26 PROCEDURE — 3075F SYST BP GE 130 - 139MM HG: CPT | Performed by: STUDENT IN AN ORGANIZED HEALTH CARE EDUCATION/TRAINING PROGRAM

## 2023-10-26 RX ORDER — INSULIN GLARGINE 100 [IU]/ML
20 INJECTION, SOLUTION SUBCUTANEOUS 2 TIMES DAILY
Qty: 12 ADJUSTABLE DOSE PRE-FILLED PEN SYRINGE | Refills: 2
Start: 2023-10-26

## 2023-10-26 NOTE — PROGRESS NOTES
Patient:  Chung Womack 46 y.o. female     10/26/23      Chiefcomplaint:   Chief Complaint   Patient presents with    Follow-up     Problems and Overview     Prednisone dependent copd leading to new onset diabetes. Started on insulin. Sugars remain significantly elevated. Also had to take increased dose for a few days. Hemoglobin A1C   Date Value Ref Range Status   10/07/2023 11.1 (H) 4.0 - 5.6 % Final       Patient Active Problem List    Diagnosis Date Noted    Essential hypertension 06/28/2022     Priority: Medium    Psoriatic arthritis ([address]) 09/22/2021    Irritable bowel syndrome     Status post total knee replacement, right 07/12/2021    Steroid-dependent COPD ([address]) 02/14/2011    Hyperlipidemia 02/14/2011    Plantar fasciitis 02/14/2011    Anxiety     Chronic back pain     Psoriasis     GERD (gastroesophageal reflux disease)      Hiatal hernia      Asthma     Obesity       Prevention:  Health Maintenance Due   Topic Date Due    Hepatitis B vaccine (1 of 3 - 3-dose series) Never done    HIV screen  Never done    Cervical cancer screen  Never done    Colorectal Cancer Screen  Never done    Breast cancer screen  Never done    Shingles vaccine (1 of 2) Never done    Low dose CT lung screening &/or counseling  Never done    Pneumococcal 0-64 years Vaccine (2 - PCV) 09/03/2021    COVID-19 Vaccine (4 - Moderna series) 05/26/2022      Meds prior:  Current Outpatient Medications   Medication Instructions    albuterol (PROVENTIL HFA;VENTOLIN HFA) 108 (90 BASE) MCG/ACT inhaler 2 puffs, Inhalation, EVERY 6 HOURS PRN    albuterol (PROVENTIL) 2.5 mg, Nebulization, EVERY 4-6 HOURS PRN    Basaglar KwikPen 20 Units, SubCUTAneous, 2 TIMES DAILY    blood glucose monitor strips 1 strip, Other, DAILY, Test one time a day & as needed for symptoms of irregular blood glucose.     fluticasone-umeclidin-vilant (TRELEGY ELLIPTA) 100-62.5-25 MCG/INH AEPB 1 puff, Inhalation, DAILY    glucose monitoring kit 1 kit, Does not apply, DAILY

## 2023-11-16 ENCOUNTER — OFFICE VISIT (OUTPATIENT)
Dept: FAMILY MEDICINE CLINIC | Age: 53
End: 2023-11-16
Payer: COMMERCIAL

## 2023-11-16 VITALS
HEIGHT: 60 IN | RESPIRATION RATE: 20 BRPM | HEART RATE: 116 BPM | TEMPERATURE: 97.8 F | SYSTOLIC BLOOD PRESSURE: 133 MMHG | WEIGHT: 186.6 LBS | BODY MASS INDEX: 36.63 KG/M2 | DIASTOLIC BLOOD PRESSURE: 83 MMHG

## 2023-11-16 DIAGNOSIS — M54.9 CHRONIC BACK PAIN, UNSPECIFIED BACK LOCATION, UNSPECIFIED BACK PAIN LATERALITY: ICD-10-CM

## 2023-11-16 DIAGNOSIS — J44.9 STEROID-DEPENDENT COPD (HCC): ICD-10-CM

## 2023-11-16 DIAGNOSIS — G89.29 CHRONIC BACK PAIN, UNSPECIFIED BACK LOCATION, UNSPECIFIED BACK PAIN LATERALITY: ICD-10-CM

## 2023-11-16 DIAGNOSIS — T38.0X5A STEROID-INDUCED DIABETES MELLITUS, INITIAL ENCOUNTER (HCC): Primary | ICD-10-CM

## 2023-11-16 DIAGNOSIS — Z92.241 STEROID-DEPENDENT COPD (HCC): ICD-10-CM

## 2023-11-16 DIAGNOSIS — E09.9 STEROID-INDUCED DIABETES MELLITUS, INITIAL ENCOUNTER (HCC): Primary | ICD-10-CM

## 2023-11-16 PROCEDURE — 99214 OFFICE O/P EST MOD 30 MIN: CPT | Performed by: STUDENT IN AN ORGANIZED HEALTH CARE EDUCATION/TRAINING PROGRAM

## 2023-11-16 PROCEDURE — 3079F DIAST BP 80-89 MM HG: CPT | Performed by: STUDENT IN AN ORGANIZED HEALTH CARE EDUCATION/TRAINING PROGRAM

## 2023-11-16 PROCEDURE — 3075F SYST BP GE 130 - 139MM HG: CPT | Performed by: STUDENT IN AN ORGANIZED HEALTH CARE EDUCATION/TRAINING PROGRAM

## 2023-11-16 RX ORDER — METAXALONE 400 MG/1
400 TABLET ORAL 3 TIMES DAILY
Qty: 30 TABLET | Refills: 0 | Status: SHIPPED | OUTPATIENT
Start: 2023-11-16 | End: 2023-11-26

## 2023-11-16 NOTE — PROGRESS NOTES
Patient:  Ghada Arriaza 46 y.o. female     11/16/23      Chiefcomplaint:   Chief Complaint   Patient presents with    Diabetes     Follow  up     Problems and Overview       Prednisone dependent copd leading to new onset diabetes. Started on insulin. Sugars remain significantly elevated. High in mid day. Missig morning bc sometimes won't take basaglar if sugar is low. Takes pred in the morning.      Wt Readings from Last 3 Encounters:   11/16/23 84.6 kg (186 lb 9.6 oz)   10/26/23 84.7 kg (186 lb 12.8 oz)   10/12/23 87.4 kg (192 lb 9.6 oz)     Hemoglobin A1C   Date Value Ref Range Status   10/07/2023 11.1 (H) 4.0 - 5.6 % Final     Htn - lisinopril   Lab Results   Component Value Date    K 3.9 10/07/2023     Lab Results   Component Value Date    CREATININE 0.6 10/07/2023       BP Readings from Last 3 Encounters:   11/16/23 133/83   10/26/23 131/72   10/12/23 127/72         Patient Active Problem List    Diagnosis Date Noted    Essential hypertension 06/28/2022     Priority: Medium    Psoriatic arthritis ([address]) 09/22/2021    Irritable bowel syndrome     Status post total knee replacement, right 07/12/2021    Steroid-dependent COPD ([address]) 02/14/2011    Hyperlipidemia 02/14/2011    Plantar fasciitis 02/14/2011    Anxiety     Chronic back pain     Psoriasis     GERD (gastroesophageal reflux disease)      Hiatal hernia      Asthma     Obesity       Prevention:  Health Maintenance Due   Topic Date Due    Hepatitis B vaccine (1 of 3 - 3-dose series) Never done    HIV screen  Never done    Cervical cancer screen  Never done    Colorectal Cancer Screen  Never done    Breast cancer screen  Never done    Shingles vaccine (1 of 2) Never done    Low dose CT lung screening &/or counseling  Never done    Pneumococcal 0-64 years Vaccine (2 - PCV) 09/03/2021    COVID-19 Vaccine (4 - 2023-24 season) 09/01/2023      Meds prior:  Current Outpatient Medications   Medication Instructions    albuterol (PROVENTIL HFA;VENTOLIN HFA) 108 (90

## 2023-11-21 DIAGNOSIS — T38.0X5A STEROID-INDUCED DIABETES MELLITUS, INITIAL ENCOUNTER (HCC): ICD-10-CM

## 2023-11-21 DIAGNOSIS — E09.9 STEROID-INDUCED DIABETES MELLITUS, INITIAL ENCOUNTER (HCC): ICD-10-CM

## 2023-11-22 RX ORDER — INSULIN GLARGINE 100 [IU]/ML
20 INJECTION, SOLUTION SUBCUTANEOUS 2 TIMES DAILY
Qty: 12 ADJUSTABLE DOSE PRE-FILLED PEN SYRINGE | Refills: 2 | Status: SHIPPED | OUTPATIENT
Start: 2023-11-22

## 2024-01-08 DIAGNOSIS — I10 ESSENTIAL HYPERTENSION: ICD-10-CM

## 2024-01-09 RX ORDER — LISINOPRIL 10 MG/1
10 TABLET ORAL DAILY
Qty: 90 TABLET | Refills: 1 | Status: SHIPPED | OUTPATIENT
Start: 2024-01-09 | End: 2024-07-07

## 2024-02-06 ENCOUNTER — TELEMEDICINE (OUTPATIENT)
Dept: FAMILY MEDICINE CLINIC | Age: 54
End: 2024-02-06
Payer: COMMERCIAL

## 2024-02-06 DIAGNOSIS — J44.9 STEROID-DEPENDENT COPD (HCC): Chronic | ICD-10-CM

## 2024-02-06 DIAGNOSIS — G89.29 CHRONIC BILATERAL LOW BACK PAIN WITHOUT SCIATICA: ICD-10-CM

## 2024-02-06 DIAGNOSIS — M54.50 CHRONIC BILATERAL LOW BACK PAIN WITHOUT SCIATICA: ICD-10-CM

## 2024-02-06 DIAGNOSIS — Z92.241 STEROID-DEPENDENT COPD (HCC): Chronic | ICD-10-CM

## 2024-02-06 DIAGNOSIS — E09.9 STEROID-INDUCED DIABETES MELLITUS, SUBSEQUENT ENCOUNTER (HCC): Primary | ICD-10-CM

## 2024-02-06 DIAGNOSIS — I10 ESSENTIAL HYPERTENSION: Chronic | ICD-10-CM

## 2024-02-06 DIAGNOSIS — T38.0X5D STEROID-INDUCED DIABETES MELLITUS, SUBSEQUENT ENCOUNTER (HCC): Primary | ICD-10-CM

## 2024-02-06 PROBLEM — F17.200 CURRENT EVERY DAY SMOKER: Status: ACTIVE | Noted: 2023-07-03

## 2024-02-06 PROBLEM — J96.11 CHRONIC RESPIRATORY FAILURE WITH HYPOXIA (HCC): Status: ACTIVE | Noted: 2023-07-03

## 2024-02-06 PROBLEM — R09.02 HYPOXEMIA: Status: ACTIVE | Noted: 2023-07-03

## 2024-02-06 PROCEDURE — 99214 OFFICE O/P EST MOD 30 MIN: CPT | Performed by: STUDENT IN AN ORGANIZED HEALTH CARE EDUCATION/TRAINING PROGRAM

## 2024-02-06 NOTE — PROGRESS NOTES
Nasal, DAILY      Physical Exam   Vitals: [facility] 07/01/2021   General Appearance: Alert, oriented, no acute distress  HEENT: No scleral icterus. No visible discharge from eyes  Extremities:  No edema  Neuro: Alert and oriented        Psych: Appropriate mood and appropriate affect  Assessment and Plan   Uncontrolled dm2 - continue basaglar 20 units bid. Need repeat A1c. Would like to consider ozempic for wt loss, diabetes.     Multiple exac recently. On abx. Following pulm.    May want to consider reingaging [place] for back pain. No new exac, weakness, bowel/bladder changes.    Steroid-induced diabetes mellitus, subsequent encounter (HCC)  -     Hemoglobin A1C; Future  Essential hypertension  -     Comprehensive Metabolic Panel; Future  Steroid-dependent COPD (HCC)  Chronic bilateral low back pain without sciatica              No follow-ups on file.    Alan Ortiz,      This document may have been prepared at least partially through the use of voice recognition software. Although effort is taken to assure the accuracy of this document, it is possible that grammatical, syntax,  or spelling errors may occur.

## 2024-03-19 ENCOUNTER — OFFICE VISIT (OUTPATIENT)
Dept: ENDOCRINOLOGY | Age: 54
End: 2024-03-19
Payer: COMMERCIAL

## 2024-03-19 VITALS
WEIGHT: 203 LBS | OXYGEN SATURATION: 99 % | BODY MASS INDEX: 39.85 KG/M2 | HEART RATE: 109 BPM | SYSTOLIC BLOOD PRESSURE: 165 MMHG | HEIGHT: 60 IN | DIASTOLIC BLOOD PRESSURE: 59 MMHG

## 2024-03-19 DIAGNOSIS — E66.09 CLASS 2 OBESITY DUE TO EXCESS CALORIES WITHOUT SERIOUS COMORBIDITY WITH BODY MASS INDEX (BMI) OF 39.0 TO 39.9 IN ADULT: ICD-10-CM

## 2024-03-19 DIAGNOSIS — T38.0X5A STEROID-INDUCED DIABETES MELLITUS, INITIAL ENCOUNTER (HCC): ICD-10-CM

## 2024-03-19 DIAGNOSIS — E09.9 STEROID-INDUCED DIABETES MELLITUS, INITIAL ENCOUNTER (HCC): ICD-10-CM

## 2024-03-19 DIAGNOSIS — Z91.119 DIETARY NONCOMPLIANCE: ICD-10-CM

## 2024-03-19 DIAGNOSIS — E11.65 POORLY CONTROLLED DIABETES MELLITUS (HCC): Primary | ICD-10-CM

## 2024-03-19 LAB — HBA1C MFR BLD: 7.8 %

## 2024-03-19 PROCEDURE — 99205 OFFICE O/P NEW HI 60 MIN: CPT | Performed by: CLINICAL NURSE SPECIALIST

## 2024-03-19 PROCEDURE — 3077F SYST BP >= 140 MM HG: CPT | Performed by: CLINICAL NURSE SPECIALIST

## 2024-03-19 PROCEDURE — 3051F HG A1C>EQUAL 7.0%<8.0%: CPT | Performed by: CLINICAL NURSE SPECIALIST

## 2024-03-19 PROCEDURE — 83036 HEMOGLOBIN GLYCOSYLATED A1C: CPT | Performed by: CLINICAL NURSE SPECIALIST

## 2024-03-19 PROCEDURE — 3078F DIAST BP <80 MM HG: CPT | Performed by: CLINICAL NURSE SPECIALIST

## 2024-03-19 RX ORDER — INSULIN GLARGINE 100 [IU]/ML
10 INJECTION, SOLUTION SUBCUTANEOUS 2 TIMES DAILY
Qty: 36 ADJUSTABLE DOSE PRE-FILLED PEN SYRINGE | Refills: 1
Start: 2024-03-19

## 2024-03-19 RX ORDER — SEMAGLUTIDE 0.68 MG/ML
INJECTION, SOLUTION SUBCUTANEOUS
Qty: 9 ML | Refills: 1 | Status: SHIPPED | OUTPATIENT
Start: 2024-03-19

## 2024-03-19 RX ORDER — SEMAGLUTIDE 0.68 MG/ML
INJECTION, SOLUTION SUBCUTANEOUS
Qty: 9 ML | Refills: 1
Start: 2024-03-19 | End: 2024-03-19 | Stop reason: SDUPTHER

## 2024-03-19 NOTE — PROGRESS NOTES
YANI Reeves     [facility]   [address], [place], [place], [address]  Phone: [phone]  Fax: [phone]  --------------------------------------------  An electronic signature was used to authenticate this note. YANI Sanchez on 3/19/2024 at 3:38 PM

## 2024-03-21 ENCOUNTER — COMMUNITY OUTREACH (OUTPATIENT)
Dept: FAMILY MEDICINE CLINIC | Age: 54
End: 2024-03-21

## 2024-03-27 ENCOUNTER — PATIENT MESSAGE (OUTPATIENT)
Dept: ENDOCRINOLOGY | Age: 54
End: 2024-03-27

## 2024-04-01 RX ORDER — POLYMYXIN B SULFATE AND TRIMETHOPRIM 1; 10000 MG/ML; [USP'U]/ML
1 SOLUTION OPHTHALMIC EVERY 4 HOURS
Qty: 3 ML | Refills: 0 | Status: SHIPPED | OUTPATIENT
Start: 2024-04-01 | End: 2024-04-11

## 2024-04-04 DIAGNOSIS — M54.50 ACUTE EXACERBATION OF CHRONIC LOW BACK PAIN: Primary | ICD-10-CM

## 2024-04-04 DIAGNOSIS — G89.29 ACUTE EXACERBATION OF CHRONIC LOW BACK PAIN: Primary | ICD-10-CM

## 2024-04-05 ENCOUNTER — TELEPHONE (OUTPATIENT)
Dept: ENDOCRINOLOGY | Age: 54
End: 2024-04-05

## 2024-04-08 DIAGNOSIS — E78.2 MIXED HYPERLIPIDEMIA: ICD-10-CM

## 2024-04-08 DIAGNOSIS — I10 ESSENTIAL HYPERTENSION: ICD-10-CM

## 2024-04-09 RX ORDER — ROSUVASTATIN CALCIUM 5 MG/1
5 TABLET, COATED ORAL DAILY
Qty: 90 TABLET | Refills: 3 | Status: SHIPPED | OUTPATIENT
Start: 2024-04-09

## 2024-04-12 RX ORDER — LISINOPRIL 10 MG/1
10 TABLET ORAL DAILY
Qty: 90 TABLET | Refills: 1 | OUTPATIENT
Start: 2024-04-12 | End: 2024-10-09

## 2024-04-15 DIAGNOSIS — T38.0X5A STEROID-INDUCED DIABETES MELLITUS, INITIAL ENCOUNTER (HCC): ICD-10-CM

## 2024-04-15 DIAGNOSIS — E09.9 STEROID-INDUCED DIABETES MELLITUS, INITIAL ENCOUNTER (HCC): ICD-10-CM

## 2024-04-15 RX ORDER — INSULIN GLARGINE 100 [IU]/ML
10 INJECTION, SOLUTION SUBCUTANEOUS 2 TIMES DAILY
Qty: 36 ADJUSTABLE DOSE PRE-FILLED PEN SYRINGE | Refills: 1 | Status: SHIPPED | OUTPATIENT
Start: 2024-04-15

## 2024-04-15 RX ORDER — BLOOD SUGAR DIAGNOSTIC
STRIP MISCELLANEOUS
Qty: 100 EACH | Refills: 3 | Status: SHIPPED | OUTPATIENT
Start: 2024-04-15

## 2024-05-02 DIAGNOSIS — G89.29 CHRONIC BILATERAL LOW BACK PAIN WITHOUT SCIATICA: Primary | ICD-10-CM

## 2024-05-02 DIAGNOSIS — M54.50 CHRONIC BILATERAL LOW BACK PAIN WITHOUT SCIATICA: Primary | ICD-10-CM

## 2024-05-02 RX ORDER — METAXALONE 800 MG/1
800 TABLET ORAL 3 TIMES DAILY
Qty: 90 TABLET | Refills: 0 | Status: SHIPPED | OUTPATIENT
Start: 2024-05-02 | End: 2024-06-01

## 2024-06-12 ENCOUNTER — OFFICE VISIT (OUTPATIENT)
Dept: FAMILY MEDICINE CLINIC | Age: 54
End: 2024-06-12
Payer: COMMERCIAL

## 2024-06-12 VITALS
RESPIRATION RATE: 18 BRPM | HEIGHT: 60 IN | HEART RATE: 105 BPM | OXYGEN SATURATION: 99 % | DIASTOLIC BLOOD PRESSURE: 93 MMHG | SYSTOLIC BLOOD PRESSURE: 160 MMHG | TEMPERATURE: 98.5 F | BODY MASS INDEX: 36.63 KG/M2 | WEIGHT: 186.6 LBS

## 2024-06-12 DIAGNOSIS — J01.90 ACUTE BACTERIAL SINUSITIS: Primary | ICD-10-CM

## 2024-06-12 DIAGNOSIS — I10 ESSENTIAL HYPERTENSION: Chronic | ICD-10-CM

## 2024-06-12 DIAGNOSIS — B96.89 ACUTE BACTERIAL SINUSITIS: Primary | ICD-10-CM

## 2024-06-12 DIAGNOSIS — J96.11 CHRONIC RESPIRATORY FAILURE WITH HYPOXIA (HCC): Chronic | ICD-10-CM

## 2024-06-12 PROCEDURE — 3077F SYST BP >= 140 MM HG: CPT | Performed by: STUDENT IN AN ORGANIZED HEALTH CARE EDUCATION/TRAINING PROGRAM

## 2024-06-12 PROCEDURE — 99214 OFFICE O/P EST MOD 30 MIN: CPT | Performed by: STUDENT IN AN ORGANIZED HEALTH CARE EDUCATION/TRAINING PROGRAM

## 2024-06-12 PROCEDURE — 3079F DIAST BP 80-89 MM HG: CPT | Performed by: STUDENT IN AN ORGANIZED HEALTH CARE EDUCATION/TRAINING PROGRAM

## 2024-06-12 RX ORDER — AMOXICILLIN AND CLAVULANATE POTASSIUM 875; 125 MG/1; MG/1
1 TABLET, FILM COATED ORAL 2 TIMES DAILY
Qty: 14 TABLET | Refills: 0 | Status: SHIPPED | OUTPATIENT
Start: 2024-06-12 | End: 2024-06-19

## 2024-06-12 RX ORDER — LISINOPRIL 30 MG/1
30 TABLET ORAL DAILY
Qty: 90 TABLET | Refills: 1
Start: 2024-06-12 | End: 2024-06-12

## 2024-06-12 RX ORDER — LISINOPRIL 30 MG/1
30 TABLET ORAL DAILY
Qty: 90 TABLET | Refills: 1 | Status: SHIPPED | OUTPATIENT
Start: 2024-06-12 | End: 2024-12-09

## 2024-06-12 NOTE — PROGRESS NOTES
her put it back on. Continue current inhalers. Return if not improving.     Acute bacterial sinusitis  -     amoxicillin-clavulanate (AUGMENTIN) 875-125 MG per tablet; Take 1 tablet by mouth 2 times daily for 7 days, Disp-14 tablet, R-0Normal  Essential hypertension  -     lisinopril (PRINIVIL;ZESTRIL) 30 MG tablet; Take 1 tablet by mouth daily, Disp-90 tablet, R-1Normal  Chronic respiratory failure with hypoxia (HCC)      No follow-ups on file.    Alan Ortiz,      This document may have been prepared at least partially through the use of voice recognition software. Although effort is taken to assure the accuracy of this document, it is possible that grammatical, syntax,  or spelling errors may occur.

## 2024-07-24 ENCOUNTER — OFFICE VISIT (OUTPATIENT)
Dept: ENDOCRINOLOGY | Age: 54
End: 2024-07-24
Payer: COMMERCIAL

## 2024-07-24 VITALS
OXYGEN SATURATION: 100 % | DIASTOLIC BLOOD PRESSURE: 89 MMHG | SYSTOLIC BLOOD PRESSURE: 157 MMHG | WEIGHT: 185 LBS | HEIGHT: 60 IN | BODY MASS INDEX: 36.32 KG/M2 | HEART RATE: 90 BPM

## 2024-07-24 DIAGNOSIS — Z91.119 DIETARY NONCOMPLIANCE: ICD-10-CM

## 2024-07-24 DIAGNOSIS — E09.9 STEROID-INDUCED DIABETES MELLITUS, INITIAL ENCOUNTER (HCC): ICD-10-CM

## 2024-07-24 DIAGNOSIS — T38.0X5A STEROID-INDUCED DIABETES MELLITUS, INITIAL ENCOUNTER (HCC): ICD-10-CM

## 2024-07-24 DIAGNOSIS — E11.65 POORLY CONTROLLED DIABETES MELLITUS (HCC): Primary | ICD-10-CM

## 2024-07-24 DIAGNOSIS — E66.09 CLASS 2 OBESITY DUE TO EXCESS CALORIES WITHOUT SERIOUS COMORBIDITY WITH BODY MASS INDEX (BMI) OF 39.0 TO 39.9 IN ADULT: ICD-10-CM

## 2024-07-24 LAB — HBA1C MFR BLD: 6.6 %

## 2024-07-24 PROCEDURE — 83036 HEMOGLOBIN GLYCOSYLATED A1C: CPT | Performed by: CLINICAL NURSE SPECIALIST

## 2024-07-24 PROCEDURE — 99214 OFFICE O/P EST MOD 30 MIN: CPT | Performed by: CLINICAL NURSE SPECIALIST

## 2024-07-24 PROCEDURE — 3077F SYST BP >= 140 MM HG: CPT | Performed by: CLINICAL NURSE SPECIALIST

## 2024-07-24 PROCEDURE — 3079F DIAST BP 80-89 MM HG: CPT | Performed by: CLINICAL NURSE SPECIALIST

## 2024-07-24 PROCEDURE — 3044F HG A1C LEVEL LT 7.0%: CPT | Performed by: CLINICAL NURSE SPECIALIST

## 2024-07-24 RX ORDER — SEMAGLUTIDE 1.34 MG/ML
1 INJECTION, SOLUTION SUBCUTANEOUS
Qty: 1 ADJUSTABLE DOSE PRE-FILLED PEN SYRINGE | Refills: 5 | Status: SHIPPED | OUTPATIENT
Start: 2024-07-24

## 2024-07-24 RX ORDER — INSULIN GLARGINE 100 [IU]/ML
6 INJECTION, SOLUTION SUBCUTANEOUS 2 TIMES DAILY
Qty: 36 ADJUSTABLE DOSE PRE-FILLED PEN SYRINGE | Refills: 1
Start: 2024-07-24

## 2024-07-24 NOTE — PROGRESS NOTES
[facility] [employer]  [facility] Department of Endocrinology Diabetes and Metabolism   [address], [place], [address]  Phone: [phone]  Fax: [phone]    Date of Service: 7/25/2024    Primary Care Physician: Alan Ortiz DO  Referring physician: No ref. provider found  Provider: YANI Sanchez - CNS     Reason for the visit:  Type 2 diabetes       History of Present Illness:  The history is provided by the patient. No  was used. Accuracy of the patient data is excellent.  aLurie Galvan is a very pleasant 54 y.o. female seen today for diabetes management     Laurie Galvan was diagnosed with diabetes at age 53  and currently on Ozempic 0.5 mg weekly, Basaglar 10 units BID    On Prednisone 10 mg daily   Hx of COPD and emphysema   The patient has been checking blood sugar 2 times per day  Fasting 100's later in day 200's     .    Most recent A1c results summarized below  Lab Results   Component Value Date/Time    LABA1C 6.6 07/24/2024 03:47 PM    LABA1C 7.8 03/19/2024 03:16 PM    LABA1C 11.1 10/07/2023 08:05 AM       Patient has had no hypoglycemic episodes   The patient has been mindful of what has been eating and following diabetic diet as encouraged  I reviewed current medications and the patient has no issues with diabetes medications  Laurie Galvan is up to date with eye exam and denied any history of diabetic retinopathy   The patient is due for an eye exam. Last eye exam was never only recently diagnosed  , no h/o diabetic retinopathy   And also performs  own feet care  Microvascular complications:  No Retinopathy, Nephropathy or Neuropathy   Macrovascular complications: no CAD, PVD, or Stroke  No HX of pancreatitis  No Hx of MTC  No HX of gastroparesis   No HX of UTI/Mycotic infection       PAST MEDICAL HISTORY   Past Medical History:   Diagnosis Date    Acute respiratory failure with hypoxia and hypercapnia (HCC) 11/28/2021    Anxiety

## 2024-09-09 ENCOUNTER — HOSPITAL ENCOUNTER (OUTPATIENT)
Dept: PULMONOLOGY | Age: 54
Discharge: HOME | End: 2024-09-09
Attending: INTERNAL MEDICINE
Payer: COMMERCIAL

## 2024-09-09 DIAGNOSIS — J44.9 CHRONIC OBSTRUCTIVE PULMONARY DISEASE, UNSPECIFIED COPD TYPE (HCC): ICD-10-CM

## 2024-09-09 PROCEDURE — 94726 PLETHYSMOGRAPHY LUNG VOLUMES: CPT

## 2024-09-09 PROCEDURE — 94729 DIFFUSING CAPACITY: CPT

## 2024-09-09 PROCEDURE — 94060 EVALUATION OF WHEEZING: CPT

## 2024-11-09 ENCOUNTER — HOSPITAL ENCOUNTER (OUTPATIENT)
Age: 54
Discharge: HOME | End: 2024-11-09
Payer: COMMERCIAL

## 2024-11-09 DIAGNOSIS — E11.65 POORLY CONTROLLED DIABETES MELLITUS (HCC): ICD-10-CM

## 2024-11-09 LAB
ALBUMIN SERPL-MCNC: 4.2 G/DL (ref 3.5–5.2)
ALP SERPL-CCNC: 68 U/L (ref 35–104)
ALT SERPL-CCNC: 23 U/L (ref 0–32)
ANION GAP SERPL CALCULATED.3IONS-SCNC: 10 MMOL/L (ref 7–16)
AST SERPL-CCNC: 18 U/L (ref 0–31)
BILIRUB SERPL-MCNC: 0.2 MG/DL (ref 0–1.2)
BUN SERPL-MCNC: 12 MG/DL (ref 6–20)
CALCIUM SERPL-MCNC: 9.7 MG/DL (ref 8.6–10.2)
CHLORIDE SERPL-SCNC: 99 MMOL/L (ref 98–107)
CHOLEST SERPL-MCNC: 166 MG/DL
CO2 SERPL-SCNC: 32 MMOL/L (ref 22–29)
CREAT SERPL-MCNC: 0.7 MG/DL (ref 0.5–1)
GFR, ESTIMATED: >90 ML/MIN/1.73M2
GLUCOSE SERPL-MCNC: 141 MG/DL (ref 74–99)
HBA1C MFR BLD: 6.7 % (ref 4–5.6)
HDLC SERPL-MCNC: 69 MG/DL
LDLC SERPL CALC-MCNC: 75 MG/DL
POTASSIUM SERPL-SCNC: 4 MMOL/L (ref 3.5–5)
PROT SERPL-MCNC: 7.7 G/DL (ref 6.4–8.3)
SODIUM SERPL-SCNC: 141 MMOL/L (ref 132–146)
TRIGL SERPL-MCNC: 109 MG/DL
VLDLC SERPL CALC-MCNC: 22 MG/DL

## 2024-11-09 PROCEDURE — 36415 COLL VENOUS BLD VENIPUNCTURE: CPT

## 2024-11-09 PROCEDURE — 83036 HEMOGLOBIN GLYCOSYLATED A1C: CPT

## 2024-11-09 PROCEDURE — 80053 COMPREHEN METABOLIC PANEL: CPT

## 2024-11-09 PROCEDURE — 80061 LIPID PANEL: CPT

## 2024-11-13 ENCOUNTER — TELEPHONE (OUTPATIENT)
Dept: ENDOCRINOLOGY | Age: 54
End: 2024-11-13

## 2024-11-13 NOTE — TELEPHONE ENCOUNTER
----- Message from Jonathan SYLVESTER sent at 11/11/2024  9:09 AM EST -----  Please call patient and inform her I have reviewed blood work and will discuss with her at next office visit

## 2024-12-09 ENCOUNTER — PATIENT MESSAGE (OUTPATIENT)
Dept: FAMILY MEDICINE CLINIC | Age: 54
End: 2024-12-09

## 2024-12-09 DIAGNOSIS — E09.9 STEROID-INDUCED DIABETES MELLITUS, INITIAL ENCOUNTER (HCC): ICD-10-CM

## 2024-12-09 DIAGNOSIS — I10 ESSENTIAL HYPERTENSION: Chronic | ICD-10-CM

## 2024-12-09 DIAGNOSIS — T38.0X5A STEROID-INDUCED DIABETES MELLITUS, INITIAL ENCOUNTER (HCC): ICD-10-CM

## 2024-12-09 RX ORDER — BLOOD SUGAR DIAGNOSTIC
1 STRIP MISCELLANEOUS 2 TIMES DAILY
Qty: 200 EACH | Refills: 3 | Status: SHIPPED | OUTPATIENT
Start: 2024-12-09

## 2024-12-10 DIAGNOSIS — I10 ESSENTIAL HYPERTENSION: Chronic | ICD-10-CM

## 2024-12-10 RX ORDER — LISINOPRIL 30 MG/1
30 TABLET ORAL DAILY
Qty: 90 TABLET | Refills: 1 | Status: SHIPPED
Start: 2024-12-10 | End: 2024-12-10 | Stop reason: SDUPTHER

## 2024-12-10 RX ORDER — LISINOPRIL 30 MG/1
30 TABLET ORAL DAILY
Qty: 90 TABLET | Refills: 1 | Status: SHIPPED | OUTPATIENT
Start: 2024-12-10 | End: 2025-06-08

## 2024-12-10 NOTE — TELEPHONE ENCOUNTER
Name of Medication(s) Requested:  Requested Prescriptions     Pending Prescriptions Disp Refills    lisinopril (PRINIVIL;ZESTRIL) 30 MG tablet 90 tablet 1     Sig: Take 1 tablet by mouth daily       Medication is on current medication list Yes    Dosage and directions were verified? Yes    Quantity verified: 90 day supply     Pharmacy Verified?  Yes    Last Appointment:  6/12/2024    Future appts:  Future Appointments   Date Time Provider Department Center   12/23/2024  3:40 PM Jonathan Reeves, APRN - CNS [facility]        (If no appt send self scheduling link. .REFILLAPPT)  Scheduling request sent?     [] Yes  [x] No    Does patient need updated?  [] Yes  [x] No

## 2024-12-23 ENCOUNTER — OFFICE VISIT (OUTPATIENT)
Dept: ENDOCRINOLOGY | Age: 54
End: 2024-12-23
Payer: COMMERCIAL

## 2024-12-23 VITALS
HEIGHT: 60 IN | DIASTOLIC BLOOD PRESSURE: 90 MMHG | HEART RATE: 82 BPM | SYSTOLIC BLOOD PRESSURE: 166 MMHG | WEIGHT: 177 LBS | OXYGEN SATURATION: 99 % | BODY MASS INDEX: 34.75 KG/M2

## 2024-12-23 DIAGNOSIS — Z91.119 DIETARY NONCOMPLIANCE: ICD-10-CM

## 2024-12-23 DIAGNOSIS — E66.812 CLASS 2 OBESITY DUE TO EXCESS CALORIES WITHOUT SERIOUS COMORBIDITY WITH BODY MASS INDEX (BMI) OF 36.0 TO 36.9 IN ADULT: ICD-10-CM

## 2024-12-23 DIAGNOSIS — E09.9 STEROID-INDUCED DIABETES MELLITUS, INITIAL ENCOUNTER (HCC): Primary | ICD-10-CM

## 2024-12-23 DIAGNOSIS — E66.09 CLASS 2 OBESITY DUE TO EXCESS CALORIES WITHOUT SERIOUS COMORBIDITY WITH BODY MASS INDEX (BMI) OF 36.0 TO 36.9 IN ADULT: ICD-10-CM

## 2024-12-23 DIAGNOSIS — T38.0X5A STEROID-INDUCED DIABETES MELLITUS, INITIAL ENCOUNTER (HCC): Primary | ICD-10-CM

## 2024-12-23 PROCEDURE — 3080F DIAST BP >= 90 MM HG: CPT | Performed by: CLINICAL NURSE SPECIALIST

## 2024-12-23 PROCEDURE — 3077F SYST BP >= 140 MM HG: CPT | Performed by: CLINICAL NURSE SPECIALIST

## 2024-12-23 PROCEDURE — 99214 OFFICE O/P EST MOD 30 MIN: CPT | Performed by: CLINICAL NURSE SPECIALIST

## 2024-12-23 PROCEDURE — G2211 COMPLEX E/M VISIT ADD ON: HCPCS | Performed by: CLINICAL NURSE SPECIALIST

## 2024-12-23 RX ORDER — SEMAGLUTIDE 2.68 MG/ML
INJECTION, SOLUTION SUBCUTANEOUS
Qty: 3 ML | Refills: 5 | Status: SHIPPED | OUTPATIENT
Start: 2024-12-23

## 2024-12-23 RX ORDER — INSULIN GLARGINE 100 [IU]/ML
6 INJECTION, SOLUTION SUBCUTANEOUS 2 TIMES DAILY
Qty: 36 ADJUSTABLE DOSE PRE-FILLED PEN SYRINGE | Refills: 1 | Status: SHIPPED | OUTPATIENT
Start: 2024-12-23

## 2024-12-23 NOTE — PROGRESS NOTES
Monofilament sensation decreased bilateral , muscle power normal  Psych: normal mood, and affect      Review of Laboratory Data:  I personally reviewed the following lab:  Lab Results   Component Value Date/Time    WBC 10.8 10/07/2023 08:05 AM    RBC 4.75 10/07/2023 08:05 AM    HGB 14.7 10/07/2023 08:05 AM    HCT 45.1 10/07/2023 08:05 AM    MCV 94.9 10/07/2023 08:05 AM    MCH 30.9 10/07/2023 08:05 AM    MCHC 32.6 10/07/2023 08:05 AM    RDW 11.7 10/07/2023 08:05 AM     10/07/2023 08:05 AM    MPV 9.9 10/07/2023 08:05 AM      Lab Results   Component Value Date/Time     11/09/2024 08:10 AM    K 4.0 11/09/2024 08:10 AM    K 3.9 12/01/2021 04:00 AM    CO2 32 (H) 11/09/2024 08:10 AM    BUN 12 11/09/2024 08:10 AM    CREATININE 0.7 11/09/2024 08:10 AM    CALCIUM 9.7 11/09/2024 08:10 AM    LABGLOM >90 11/09/2024 08:10 AM    LABGLOM >60 10/07/2023 08:05 AM    GFRAA >60 12/03/2021 04:15 AM      Lab Results   Component Value Date/Time    TSH 0.402 11/28/2021 08:06 AM     Lab Results   Component Value Date/Time    LABA1C 6.7 11/09/2024 08:10 AM    GLUCOSE 141 11/09/2024 08:10 AM    GLUCOSE 113 02/08/2011 09:15 AM     Lab Results   Component Value Date/Time    LABA1C 6.7 11/09/2024 08:10 AM    LABA1C 6.6 07/24/2024 03:47 PM    LABA1C 7.8 03/19/2024 03:16 PM     Lab Results   Component Value Date/Time    TRIG 109 11/09/2024 08:10 AM    HDL 69 11/09/2024 08:10 AM    CHOL 166 11/09/2024 08:10 AM     No results found for: \"VITD25\"    ASSESSMENT & RECOMMENDATIONS   Laurie S Cole, a 54 y.o.-old female seen in for the following issues       Assessment:      Diagnosis Orders   1. Steroid-induced diabetes mellitus, initial encounter (HCC)        2. Class 2 obesity due to excess calories without serious comorbidity with body mass index (BMI) of 36.0 to 36.9 in adult        3. Dietary noncompliance                Plan:     1. Poorly controlled diabetes mellitus (HCC)   Patient's diabetes much improved.  Hemoglobin A1c 6.7

## 2025-01-21 ASSESSMENT — PATIENT HEALTH QUESTIONNAIRE - PHQ9
2. FEELING DOWN, DEPRESSED OR HOPELESS: SEVERAL DAYS
SUM OF ALL RESPONSES TO PHQ QUESTIONS 1-9: 2
SUM OF ALL RESPONSES TO PHQ QUESTIONS 1-9: 2
SUM OF ALL RESPONSES TO PHQ9 QUESTIONS 1 & 2: 2
SUM OF ALL RESPONSES TO PHQ QUESTIONS 1-9: 2
1. LITTLE INTEREST OR PLEASURE IN DOING THINGS: SEVERAL DAYS
SUM OF ALL RESPONSES TO PHQ QUESTIONS 1-9: 2

## 2025-01-22 ASSESSMENT — PATIENT HEALTH QUESTIONNAIRE - PHQ9
SUM OF ALL RESPONSES TO PHQ9 QUESTIONS 1 & 2: 2
2. FEELING DOWN, DEPRESSED OR HOPELESS: SEVERAL DAYS
1. LITTLE INTEREST OR PLEASURE IN DOING THINGS: SEVERAL DAYS

## 2025-01-28 ENCOUNTER — OFFICE VISIT (OUTPATIENT)
Dept: FAMILY MEDICINE CLINIC | Age: 55
End: 2025-01-28

## 2025-01-28 VITALS
HEIGHT: 60 IN | HEART RATE: 104 BPM | OXYGEN SATURATION: 99 % | TEMPERATURE: 97 F | DIASTOLIC BLOOD PRESSURE: 79 MMHG | SYSTOLIC BLOOD PRESSURE: 119 MMHG | WEIGHT: 171.4 LBS | RESPIRATION RATE: 18 BRPM | BODY MASS INDEX: 33.65 KG/M2

## 2025-01-28 DIAGNOSIS — T38.0X5D STEROID-INDUCED DIABETES MELLITUS, SUBSEQUENT ENCOUNTER (HCC): Chronic | ICD-10-CM

## 2025-01-28 DIAGNOSIS — M62.838 MUSCLE SPASM: Primary | ICD-10-CM

## 2025-01-28 DIAGNOSIS — Z23 NEED FOR IMMUNIZATION AGAINST INFLUENZA: ICD-10-CM

## 2025-01-28 DIAGNOSIS — Z12.31 BREAST CANCER SCREENING BY MAMMOGRAM: ICD-10-CM

## 2025-01-28 DIAGNOSIS — Z12.11 SCREEN FOR COLON CANCER: ICD-10-CM

## 2025-01-28 DIAGNOSIS — G89.29 CHRONIC BILATERAL LOW BACK PAIN WITHOUT SCIATICA: Chronic | ICD-10-CM

## 2025-01-28 DIAGNOSIS — Z92.241 STEROID-DEPENDENT COPD (HCC): Chronic | ICD-10-CM

## 2025-01-28 DIAGNOSIS — E09.9 STEROID-INDUCED DIABETES MELLITUS, SUBSEQUENT ENCOUNTER (HCC): Chronic | ICD-10-CM

## 2025-01-28 DIAGNOSIS — I10 ESSENTIAL HYPERTENSION: Chronic | ICD-10-CM

## 2025-01-28 DIAGNOSIS — M54.50 CHRONIC BILATERAL LOW BACK PAIN WITHOUT SCIATICA: Chronic | ICD-10-CM

## 2025-01-28 DIAGNOSIS — J96.11 CHRONIC RESPIRATORY FAILURE WITH HYPOXIA: ICD-10-CM

## 2025-01-28 DIAGNOSIS — J44.9 STEROID-DEPENDENT COPD (HCC): Chronic | ICD-10-CM

## 2025-01-28 PROBLEM — F17.200 CURRENT EVERY DAY SMOKER: Status: RESOLVED | Noted: 2023-07-03 | Resolved: 2025-01-28

## 2025-01-28 LAB
CREATININE URINE: 219 MG/DL (ref 29–226)
MICROALBUMIN/CREAT 24H UR: 20 MG/L (ref 0–19)
MICROALBUMIN/CREAT UR-RTO: 9 MCG/MG CREAT (ref 0–30)

## 2025-01-28 RX ORDER — GABAPENTIN 300 MG/1
300 CAPSULE ORAL NIGHTLY
Qty: 30 CAPSULE | Refills: 0 | Status: SHIPPED | OUTPATIENT
Start: 2025-01-28 | End: 2025-02-27

## 2025-01-28 SDOH — ECONOMIC STABILITY: FOOD INSECURITY: WITHIN THE PAST 12 MONTHS, YOU WORRIED THAT YOUR FOOD WOULD RUN OUT BEFORE YOU GOT MONEY TO BUY MORE.: NEVER TRUE

## 2025-01-28 SDOH — ECONOMIC STABILITY: FOOD INSECURITY: WITHIN THE PAST 12 MONTHS, THE FOOD YOU BOUGHT JUST DIDN'T LAST AND YOU DIDN'T HAVE MONEY TO GET MORE.: NEVER TRUE

## 2025-01-28 NOTE — PROGRESS NOTES
Patient:  Laurie Galvan 54 y.o. female     01/28/25      Chiefcomplaint:   Chief Complaint   Patient presents with    Annual Exam    lump     On forehead-- getting bigger    Other     Sores in nose.    leg cramps     Problems and Overview     Lump on forehead is increasing in size - cyst  Leg cramps, nocturnal, chronic - tried muscle roller and pain patches but not improving.   Recnet labs did not show electrolyte abnormality. Hx back issues.    Chronic resp failure due to copd - o2 1-4. trelegy    Steroid induced dm  Hemoglobin A1C   Date Value Ref Range Status   11/09/2024 6.7 (H) 4.0 - 5.6 % Final       Patient Active Problem List    Diagnosis Date Noted    Essential hypertension 06/28/2022     Priority: Medium    Chronic respiratory failure with hypoxia 07/03/2023    Hypoxemia 07/03/2023    Psoriatic arthritis (HCC) 09/22/2021    Irritable bowel syndrome     Status post total knee replacement, right 07/12/2021    Steroid-dependent COPD (HCC) 02/14/2011    Hyperlipidemia 02/14/2011    Plantar fasciitis 02/14/2011    Anxiety     Chronic back pain     Psoriasis     GERD (gastroesophageal reflux disease)      Hiatal hernia      Asthma     Obesity       Prevention:  Health Maintenance Due   Topic Date Due    Diabetic foot exam  Never done    HIV screen  Never done    Diabetic Alb to Cr ratio (uACR) test  Never done    Diabetic retinal exam  Never done    Hepatitis B vaccine (1 of 3 - 19+ 3-dose series) Never done    Cervical cancer screen  Never done    Breast cancer screen  Never done    Colorectal Cancer Screen  Never done    Shingles vaccine (1 of 2) Never done    Pneumococcal 0-64 years Vaccine (2 of 2 - PCV) 09/03/2021    Lung Cancer Screening &/or Counseling  10/15/2023    COVID-19 Vaccine (4 - 2023-24 season) 09/01/2024      Meds prior:  Current Outpatient Medications   Medication Instructions    albuterol (PROVENTIL HFA;VENTOLIN HFA) 108 (90 BASE) MCG/ACT inhaler 2 puffs, Inhalation, EVERY 6 HOURS PRN

## 2025-01-29 DIAGNOSIS — Z12.31 BREAST CANCER SCREENING BY MAMMOGRAM: Primary | ICD-10-CM

## 2025-02-12 ENCOUNTER — PATIENT MESSAGE (OUTPATIENT)
Dept: FAMILY MEDICINE CLINIC | Age: 55
End: 2025-02-12

## 2025-02-12 DIAGNOSIS — D23.30 DERMOID CYST OF FACE: Primary | ICD-10-CM

## 2025-02-13 DIAGNOSIS — L72.0 EPIDERMAL CYST OF FACE: Primary | ICD-10-CM

## 2025-02-14 DIAGNOSIS — L72.0 EPIDERMAL CYST: Primary | ICD-10-CM

## 2025-02-25 ENCOUNTER — TRANSCRIBE ORDERS (OUTPATIENT)
Dept: ADMINISTRATIVE | Age: 55
End: 2025-02-25

## 2025-02-25 DIAGNOSIS — J44.9 CHRONIC OBSTRUCTIVE PULMONARY DISEASE, UNSPECIFIED COPD TYPE (HCC): Primary | ICD-10-CM

## 2025-02-28 ENCOUNTER — PREP FOR PROCEDURE (OUTPATIENT)
Dept: SURGERY | Age: 55
End: 2025-02-28

## 2025-02-28 ENCOUNTER — OFFICE VISIT (OUTPATIENT)
Dept: SURGERY | Age: 55
End: 2025-02-28

## 2025-02-28 VITALS
WEIGHT: 166 LBS | SYSTOLIC BLOOD PRESSURE: 129 MMHG | BODY MASS INDEX: 32.59 KG/M2 | OXYGEN SATURATION: 100 % | HEART RATE: 95 BPM | HEIGHT: 60 IN | DIASTOLIC BLOOD PRESSURE: 77 MMHG | RESPIRATION RATE: 16 BRPM

## 2025-02-28 DIAGNOSIS — J43.2 CENTRILOBULAR EMPHYSEMA (HCC): ICD-10-CM

## 2025-02-28 DIAGNOSIS — L72.0 EPIDERMAL CYST OF FACE: Primary | ICD-10-CM

## 2025-02-28 DIAGNOSIS — J44.9 STEROID-DEPENDENT COPD (HCC): ICD-10-CM

## 2025-02-28 DIAGNOSIS — Z92.241 STEROID-DEPENDENT COPD (HCC): ICD-10-CM

## 2025-02-28 DIAGNOSIS — L72.0 EPIDERMOID CYST: ICD-10-CM

## 2025-02-28 NOTE — H&P (VIEW-ONLY)
Stability: Low Risk  (1/28/2025)    Housing Stability Vital Sign     Unable to Pay for Housing in the Last Year: No     Number of Times Moved in the Last Year: 0     Homeless in the Last Year: No       Review of systems-pertinent positives noted in HPI, otherwise negative    Physical Exam:  Vitals:    02/28/25 0853   BP: 129/77   Pulse: 95   Resp: 16   SpO2: 100%       PSYCH: mood and affect normal, alert and oriented x 3  CONSTITUTIONAL: No apparent distress, comfortable  EYES: Sclera white, pupils equal round and reactive to light  ENMT:  Hearing normal, trachea midline, ears externally intact    RESP: Breath sounds were clear and equal             Respiratory effort was normal   CV: Heart sounds were normal with a regular rate and rhythm.     GI/ Abdomen: The abdomen was soft and non distended.                     There was no tenderness, guarding, rebound, or rigidity.     Rectal -deferred  MSK: no clubbing/ no cyanosis/ gait normal    Assessment & Plan   Assessment/Plan:     Diagnosis Orders   1. Epidermal cyst of face        2. Centrilobular emphysema (HCC)        3. Steroid-dependent COPD (HCC)               I have reviewed the prior progress note from the patient's Pulmonary provider visit on 2/19  .  I have reviewed the progress note from the FM visit on 1/28.    I have reviewed the following labs:  CBC with Differential:    Lab Results   Component Value Date/Time    WBC 10.8 10/07/2023 08:05 AM    RBC 4.75 10/07/2023 08:05 AM    HGB 14.7 10/07/2023 08:05 AM    HCT 45.1 10/07/2023 08:05 AM     10/07/2023 08:05 AM    MCV 94.9 10/07/2023 08:05 AM    MCH 30.9 10/07/2023 08:05 AM    MCHC 32.6 10/07/2023 08:05 AM    RDW 11.7 10/07/2023 08:05 AM    NRBC 0.0 07/07/2021 10:47 AM    LYMPHOPCT 20 10/07/2023 08:05 AM    MONOPCT 5 10/07/2023 08:05 AM    EOSPCT 1 10/07/2023 08:05 AM    BASOPCT 0 10/07/2023 08:05 AM    MONOSABS 0.52 10/07/2023 08:05 AM    LYMPHSABS 2.17 10/07/2023 08:05 AM    EOSABS 0.10

## 2025-02-28 NOTE — PROGRESS NOTES
[facility]    General Surgery Attending History and Physical    Patient's Name/Date of Birth: Laurie Galvan / 1970 (54 y.o.)    Date: February 28, 2025     CC:facial cyst    HPI:  55 yo complains of facial cyst that has been present for several years. 2 weeks ago, it started draining. She was referred here for evaluation.     She is a former smoker--quit smoking 4 years ago. She has emphsema. She takes chronic prednisone 10 mg daily for COPD  She is currently on 2 L NC    Pt has been on ozempic and has lost 44 pounds         Past Medical History:   Diagnosis Date    Acute respiratory failure with hypoxia and hypercapnia ([facility]) 11/28/2021    Anxiety     Arthritis     Asthma     Chronic back pain     Chronic lower back pain     Chronic neck pain     COPD     COPD exacerbation (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)     Wears contact lenses     Wears dentures     upper    Wears glasses        Past Surgical History:   Procedure Laterality Date    CHOLECYSTECTOMY  11/2003    EYE SURGERY      Cataracts    HERNIA REPAIR  11/2003    umbilical    KNEE ARTHROSCOPY Right     LAPAROSCOPY      LEEP      LYMPH NODE BIOPSY      TOTAL KNEE ARTHROPLASTY Right 07/12/2021    RIGHT KNEE JOHNNIE ROBOTIC ASSISTED TOTAL ARTHROPLASTY  PNB performed by Jordan Echevarria MD at [facility] OR    TUBAL LIGATION      UPPER GASTROINTESTINAL ENDOSCOPY         Current Outpatient Medications   Medication Sig Dispense Refill    semaglutide, 2 MG/DOSE, (OZEMPIC, 2 MG/DOSE,) 8 MG/3ML SOPN sc

## 2025-03-04 NOTE — PROGRESS NOTES
[facility]   PRE-ADMISSION TESTING GENERAL INSTRUCTIONS  PAT Phone Number: [phone]      GENERAL INSTRUCTIONS:    [x] Antibacterial Soap Shower Night before AND the Morning of procedure.  [x] Do not wear makeup, lotions, powders, deodorant the morning of surgery.  [x] No solid food after midnight. You may have SIPS of clear liquids up until 2 hours before your arrival time to the hospital.   [x] You may brush your teeth, gargle, but do not swallow water.   [x] No tobacco products, illegal drugs, or alcohol within 24 hours of your surgery.  [x] Jewelry or valuables should not be brought to the hospital. All body and/or tongue piercing's must be removed prior to arriving to hospital. No contact lens or hair pins.   [x] Arrange transportation with a responsible adult  to and from the hospital. Arrange for someone to be with you for the remainder of the day and for 24 hours after your procedure due to having had anesthesia.          -Who will be your  for transportation?         -Who will be staying with you for 24 hrs after your procedure?   [x] Bring insurance card and photo ID.     PARKING INSTRUCTIONS:     [x] ARRIVAL DATE & TIME: 3/10 at 0530  [x] Times are subject to change. We will contact you the business day before surgery if that were to occur.  [x] Enter into the [facility] Entrance. Two people may accompany you. Masks are not required.  [x] Parking Lot \"I\" is where you will park. It is located on the corner of [address] and [address]. The entrance is on [address].   Only one vehicle - per patient, is permitted in parking lot.   Upon entering the parking lot, a voucher ticket will print.    EDUCATION INSTRUCTIONS:           [] [facility] Pamphlet placed in chart.  [] Pre-admission Testing educational folder given.  [] Incentive Spirometry,coughing & deep breathing exercises reviewed.  [] Fluoroscopy-Xray

## 2025-03-08 ENCOUNTER — ANESTHESIA EVENT (OUTPATIENT)
Dept: OPERATING ROOM | Age: 55
End: 2025-03-08
Payer: COMMERCIAL

## 2025-03-10 ENCOUNTER — ANESTHESIA (OUTPATIENT)
Dept: OPERATING ROOM | Age: 55
End: 2025-03-10
Payer: COMMERCIAL

## 2025-03-10 ENCOUNTER — HOSPITAL ENCOUNTER (OUTPATIENT)
Age: 55
Setting detail: OUTPATIENT SURGERY
Discharge: HOME | End: 2025-03-10
Attending: SURGERY | Admitting: SURGERY
Payer: COMMERCIAL

## 2025-03-10 VITALS
TEMPERATURE: 97.8 F | OXYGEN SATURATION: 100 % | HEIGHT: 60 IN | BODY MASS INDEX: 32.59 KG/M2 | DIASTOLIC BLOOD PRESSURE: 78 MMHG | WEIGHT: 166 LBS | HEART RATE: 86 BPM | SYSTOLIC BLOOD PRESSURE: 128 MMHG | RESPIRATION RATE: 18 BRPM

## 2025-03-10 DIAGNOSIS — Z01.812 PRE-OPERATIVE LABORATORY EXAMINATION: Primary | ICD-10-CM

## 2025-03-10 DIAGNOSIS — L72.0 EPIDERMOID CYST: ICD-10-CM

## 2025-03-10 LAB — GLUCOSE BLD-MCNC: 91 MG/DL (ref 74–99)

## 2025-03-10 PROCEDURE — 7100000000 HC PACU RECOVERY - FIRST 15 MIN: Performed by: SURGERY

## 2025-03-10 PROCEDURE — 6360000002 HC RX W HCPCS: Performed by: SURGERY

## 2025-03-10 PROCEDURE — 2580000003 HC RX 258: Performed by: SURGERY

## 2025-03-10 PROCEDURE — 2500000003 HC RX 250 WO HCPCS: Performed by: SURGERY

## 2025-03-10 PROCEDURE — 3600000015 HC SURGERY LEVEL 5 ADDTL 15MIN: Performed by: SURGERY

## 2025-03-10 PROCEDURE — 82962 GLUCOSE BLOOD TEST: CPT

## 2025-03-10 PROCEDURE — 3700000001 HC ADD 15 MINUTES (ANESTHESIA): Performed by: SURGERY

## 2025-03-10 PROCEDURE — 3600000005 HC SURGERY LEVEL 5 BASE: Performed by: SURGERY

## 2025-03-10 PROCEDURE — 2709999900 HC NON-CHARGEABLE SUPPLY: Performed by: SURGERY

## 2025-03-10 PROCEDURE — 3700000000 HC ANESTHESIA ATTENDED CARE: Performed by: SURGERY

## 2025-03-10 PROCEDURE — 88304 TISSUE EXAM BY PATHOLOGIST: CPT

## 2025-03-10 PROCEDURE — 6370000000 HC RX 637 (ALT 250 FOR IP): Performed by: SURGERY

## 2025-03-10 PROCEDURE — 6360000002 HC RX W HCPCS

## 2025-03-10 PROCEDURE — 7100000001 HC PACU RECOVERY - ADDTL 15 MIN: Performed by: SURGERY

## 2025-03-10 RX ORDER — FENTANYL CITRATE 50 UG/ML
INJECTION, SOLUTION INTRAMUSCULAR; INTRAVENOUS
Status: DISCONTINUED | OUTPATIENT
Start: 2025-03-10 | End: 2025-03-10 | Stop reason: SDUPTHER

## 2025-03-10 RX ORDER — BACITRACIN ZINC 500 [USP'U]/G
OINTMENT TOPICAL PRN
Status: DISCONTINUED | OUTPATIENT
Start: 2025-03-10 | End: 2025-03-10 | Stop reason: ALTCHOICE

## 2025-03-10 RX ORDER — ACETAMINOPHEN 500 MG
500 TABLET ORAL 4 TIMES DAILY PRN
Qty: 56 TABLET | Refills: 0 | Status: SHIPPED | OUTPATIENT
Start: 2025-03-10 | End: 2025-03-24

## 2025-03-10 RX ORDER — LIDOCAINE HYDROCHLORIDE AND EPINEPHRINE 10; 10 MG/ML; UG/ML
INJECTION, SOLUTION INFILTRATION; PERINEURAL PRN
Status: DISCONTINUED | OUTPATIENT
Start: 2025-03-10 | End: 2025-03-10 | Stop reason: ALTCHOICE

## 2025-03-10 RX ORDER — SODIUM CHLORIDE 9 MG/ML
INJECTION, SOLUTION INTRAVENOUS PRN
Status: CANCELLED | OUTPATIENT
Start: 2025-03-10

## 2025-03-10 RX ORDER — POVIDONE-IODINE 5 %
SOLUTION, NON-ORAL OPHTHALMIC (EYE) PRN
Status: DISCONTINUED | OUTPATIENT
Start: 2025-03-10 | End: 2025-03-10 | Stop reason: ALTCHOICE

## 2025-03-10 RX ORDER — NALOXONE HYDROCHLORIDE 0.4 MG/ML
INJECTION, SOLUTION INTRAMUSCULAR; INTRAVENOUS; SUBCUTANEOUS PRN
Status: CANCELLED | OUTPATIENT
Start: 2025-03-10

## 2025-03-10 RX ORDER — SODIUM CHLORIDE 9 MG/ML
INJECTION, SOLUTION INTRAVENOUS CONTINUOUS
Status: DISCONTINUED | OUTPATIENT
Start: 2025-03-10 | End: 2025-03-10 | Stop reason: HOSPADM

## 2025-03-10 RX ORDER — SODIUM CHLORIDE 9 MG/ML
INJECTION, SOLUTION INTRAVENOUS PRN
Status: DISCONTINUED | OUTPATIENT
Start: 2025-03-10 | End: 2025-03-10 | Stop reason: HOSPADM

## 2025-03-10 RX ORDER — MIDAZOLAM HYDROCHLORIDE 1 MG/ML
INJECTION, SOLUTION INTRAMUSCULAR; INTRAVENOUS
Status: DISCONTINUED | OUTPATIENT
Start: 2025-03-10 | End: 2025-03-10 | Stop reason: SDUPTHER

## 2025-03-10 RX ORDER — SODIUM CHLORIDE 0.9 % (FLUSH) 0.9 %
5-40 SYRINGE (ML) INJECTION EVERY 12 HOURS SCHEDULED
Status: CANCELLED | OUTPATIENT
Start: 2025-03-10

## 2025-03-10 RX ORDER — FENTANYL CITRATE 50 UG/ML
25 INJECTION, SOLUTION INTRAMUSCULAR; INTRAVENOUS EVERY 5 MIN PRN
Refills: 0 | Status: CANCELLED | OUTPATIENT
Start: 2025-03-10

## 2025-03-10 RX ORDER — SODIUM CHLORIDE 0.9 % (FLUSH) 0.9 %
5-40 SYRINGE (ML) INJECTION PRN
Status: DISCONTINUED | OUTPATIENT
Start: 2025-03-10 | End: 2025-03-10 | Stop reason: HOSPADM

## 2025-03-10 RX ORDER — FENTANYL CITRATE 50 UG/ML
50 INJECTION, SOLUTION INTRAMUSCULAR; INTRAVENOUS EVERY 5 MIN PRN
Refills: 0 | Status: CANCELLED | OUTPATIENT
Start: 2025-03-10

## 2025-03-10 RX ORDER — SODIUM CHLORIDE 0.9 % (FLUSH) 0.9 %
5-40 SYRINGE (ML) INJECTION EVERY 12 HOURS SCHEDULED
Status: DISCONTINUED | OUTPATIENT
Start: 2025-03-10 | End: 2025-03-10 | Stop reason: HOSPADM

## 2025-03-10 RX ORDER — ONDANSETRON 2 MG/ML
INJECTION INTRAMUSCULAR; INTRAVENOUS
Status: DISCONTINUED | OUTPATIENT
Start: 2025-03-10 | End: 2025-03-10 | Stop reason: SDUPTHER

## 2025-03-10 RX ORDER — SODIUM CHLORIDE 0.9 % (FLUSH) 0.9 %
5-40 SYRINGE (ML) INJECTION PRN
Status: CANCELLED | OUTPATIENT
Start: 2025-03-10

## 2025-03-10 RX ORDER — ONDANSETRON 2 MG/ML
4 INJECTION INTRAMUSCULAR; INTRAVENOUS
Status: CANCELLED | OUTPATIENT
Start: 2025-03-10 | End: 2025-03-11

## 2025-03-10 RX ORDER — BACITRACIN ZINC AND POLYMYXIN B SULFATE 500; 1000 [USP'U]/G; [USP'U]/G
OINTMENT TOPICAL
Qty: 1 EACH | Refills: 0 | Status: SHIPPED | OUTPATIENT
Start: 2025-03-10 | End: 2025-03-17

## 2025-03-10 RX ORDER — PROPOFOL 10 MG/ML
INJECTION, EMULSION INTRAVENOUS
Status: DISCONTINUED | OUTPATIENT
Start: 2025-03-10 | End: 2025-03-10 | Stop reason: SDUPTHER

## 2025-03-10 RX ADMIN — FENTANYL CITRATE 50 MCG: 50 INJECTION, SOLUTION INTRAMUSCULAR; INTRAVENOUS at 07:26

## 2025-03-10 RX ADMIN — CEFAZOLIN 2000 MG: 2 INJECTION, POWDER, FOR SOLUTION INTRAMUSCULAR; INTRAVENOUS at 07:33

## 2025-03-10 RX ADMIN — MIDAZOLAM 2 MG: 1 INJECTION INTRAMUSCULAR; INTRAVENOUS at 07:18

## 2025-03-10 RX ADMIN — SODIUM CHLORIDE: 9 INJECTION, SOLUTION INTRAVENOUS at 06:22

## 2025-03-10 RX ADMIN — PROPOFOL 50 MCG/KG/MIN: 10 INJECTION, EMULSION INTRAVENOUS at 07:26

## 2025-03-10 RX ADMIN — ONDANSETRON HYDROCHLORIDE 4 MG: 2 SOLUTION INTRAMUSCULAR; INTRAVENOUS at 07:26

## 2025-03-10 RX ADMIN — SODIUM CHLORIDE: 9 INJECTION, SOLUTION INTRAVENOUS at 07:18

## 2025-03-10 RX ADMIN — FENTANYL CITRATE 50 MCG: 50 INJECTION, SOLUTION INTRAMUSCULAR; INTRAVENOUS at 07:39

## 2025-03-10 ASSESSMENT — PAIN - FUNCTIONAL ASSESSMENT: PAIN_FUNCTIONAL_ASSESSMENT: NONE - DENIES PAIN

## 2025-03-10 NOTE — OP NOTE
Operative Note      Patient: Laurie Galvan  YOB: 1970  MRN: [id number]    Date of Procedure: 3/10/2025    Pre-Op Diagnosis Codes:      * Epidermoid cyst [L72.0] of face, COPD on chronic steroids-prednisone 10 mg daily, chronic hypoxia on 2 L nasal cannula    Post-Op Diagnosis: Same       Procedure(s):  FACIAL LESION BIOPSY EXCISION:  1.  Surgical excision of facial lesion.  Size of excision -- 2 centimeters  2.  Intermediate repair of facial wound with layered closure.  Total length of closure equals 2 cm centimeters    Anesthesia: 10 cc 1% lidocaine with epinephrine      Surgeon(s):  Nader Nino MD    Assistant:   Resident: Cynthia Soliz MD; Aniceto Walker DO    Anesthesia: Monitor Anesthesia Care    Estimated Blood Loss (mL): Minimal    Complications: None    Specimens:   ID Type Source Tests Collected by Time Destination   A : FOREHEAD LESION Tissue Tissue SURGICAL PATHOLOGY Nader Nino MD 3/10/2025 0744        Implants:  * No implants in log *      Drains: * No LDAs found *    Findings:  Infection Present At Time Of Surgery (PATOS) (choose all levels that have infection present):  No infection present  Other Findings: 2 cm epidermoid cyst   This procedure was not performed to treat primary cutaneous melanoma through wide local excision    Detailed Description of Procedure:     The area was prepped and draped in the standard sterile fashion. 10 cc 1% lidocaine with epinephrine was injected to provide local anesthesia.  Using a #15 blade, an elliptical incision was used to excise the skin lesion.  Lesion was located in the subcutaneous space down to the muscle.    The lesion excised measured  2 x 0.5 centimeters.    The specimen was removed and placed in a specimen container for surgical pathology. hemostasis was achieved.     Afterwards the wound was irrigated with sterile saline and it closed in multiple layers.  3-0 Vicryl was used to reapproximate the deep dermal layers with one

## 2025-03-10 NOTE — INTERVAL H&P NOTE
Update History & Physical    The patient's History and Physical of February 28, 2025 was reviewed with the patient and I examined the patient. Pt states her cyst broke and drained a few days ago, denies fevers, chills. The surgical site was confirmed by the patient and me.     Plan: The risks, benefits, expected outcome, and alternative to the recommended procedure have been discussed with the patient. Patient understands and wants to proceed with the procedure.     Electronically signed by Cynthia Soliz MD on 3/10/2025 at 5:59 AM

## 2025-03-10 NOTE — DISCHARGE INSTRUCTIONS
SURGERY DISCHARGE INSTRUCTIONS    You may be drowsy or lightheaded after receiving sedation or anesthesia.    A responsible person should be with you for the next 24 hours.    FOLLOW UP: Call office to schedule follow-up appointment in 2 weeks.    DIET: Advance your diet as tolerated. Start with light diet and progress to your normal diet as you feel like eating. If you experience nausea or repeated episodes of vomiting which persist beyond 12-24 hours, notify your doctor.    ACTIVITY: Rest today. Increase activity gradually.  No driving while on prescription pain medication. No heavy lifting for 4 weeks - nothing over 10 lbs, or heavier than a milk jug.    SHOWER/BATHING: Okay to shower in 24 hours after procedure. No tub bathing, swimming or soaking for 2 weeks.    WOUND CARE: You have a clean band aide. You may remove it in 24 hours. You do not need a new dressing but may apply one if you would like. Keep incisions clean and dry. Always ensure you and your care giver clean hands before and after caring for the wound. Apply bacitracin to the incision.     MEDICATIONS: Take as prescribed. You may take over the counter ibuprofen or tylenol for pain as directed, limit total amount of acetaminophen (tylenol) to 3 grams per 24-hour period. Okay to resume anticoagulant medication after 24hrs. You may experience constipation while taking pain medication, you may take over the counter stool softeners (Docusate/Colace or Senokot-S) as directed.     SPECIAL INSTRUCTIONS:   Call physician if they or any other problems occur:  Call the office if you have a fever over >101F, or if your incision becomes red, tender, or drains more than a small amount of clear fluid  Fever over 101°    Redness, swelling, hardness or warmth at the operative site  Unrelieved nausea    Foul smelling or cloudy drainage at the operative site   Unrelieved pain    Blood soaked dressing (Some oozing may be normal)

## 2025-03-10 NOTE — ANESTHESIA POSTPROCEDURE EVALUATION
Department of Anesthesiology  Postprocedure Note    Patient: Laurie Galvan  MRN: [id number]  YOB: 1970  Date of evaluation: 3/10/2025    Procedure Summary       Date: 03/10/25 Room / Location: [address]    Anesthesia Start: 0718 Anesthesia Stop: 0802    Procedure: FACIAL LESION BIOPSY EXCISION (Face) Diagnosis:       Epidermoid cyst      (Epidermoid cyst [L72.0])    Surgeons: Nader Nino MD Responsible Provider: Chinedu Arevalo DO    Anesthesia Type: MAC ASA Status: 3            Anesthesia Type: MAC    Theodore Phase I: Theodore Score: 9    Theodore Phase II:      Anesthesia Post Evaluation    Patient location during evaluation: PACU  Patient participation: complete - patient participated  Level of consciousness: awake and alert  Pain score: 1  Airway patency: patent  Nausea & Vomiting: no nausea and no vomiting  Cardiovascular status: hemodynamically stable  Respiratory status: acceptable  Hydration status: euvolemic  Pain management: adequate    No notable events documented.

## 2025-03-12 LAB — SURGICAL PATHOLOGY REPORT: NORMAL

## 2025-03-28 ENCOUNTER — HOSPITAL ENCOUNTER (OUTPATIENT)
Dept: CT IMAGING | Age: 55
Discharge: HOME | End: 2025-03-30
Attending: INTERNAL MEDICINE
Payer: COMMERCIAL

## 2025-03-28 DIAGNOSIS — J44.9 CHRONIC OBSTRUCTIVE PULMONARY DISEASE, UNSPECIFIED COPD TYPE (HCC): ICD-10-CM

## 2025-03-28 PROCEDURE — 71250 CT THORAX DX C-: CPT

## 2025-03-30 DIAGNOSIS — I10 ESSENTIAL HYPERTENSION: ICD-10-CM

## 2025-03-30 DIAGNOSIS — E78.2 MIXED HYPERLIPIDEMIA: ICD-10-CM

## 2025-03-31 ENCOUNTER — PATIENT MESSAGE (OUTPATIENT)
Dept: FAMILY MEDICINE CLINIC | Age: 55
End: 2025-03-31

## 2025-03-31 NOTE — TELEPHONE ENCOUNTER
Name of Medication(s) Requested:  Requested Prescriptions     Pending Prescriptions Disp Refills    metoprolol tartrate (LOPRESSOR) 25 MG tablet 90 tablet 3     Sig: Take 0.5 tablets by mouth 2 times daily    rosuvastatin (CRESTOR) 5 MG tablet 90 tablet 3     Sig: Take 1 tablet by mouth daily       Medication is on current medication list Yes    Dosage and directions were verified? Yes    Quantity verified: 90 day supply     Pharmacy Verified?  Yes    Last Appointment:  1/28/2025    Future appts:  Future Appointments   Date Time Provider Department Center   4/9/2025  1:30 [facility]   4/23/2025  2:40 PM Jonathan Reeves, APRN - CNS [facility]        (If no appt send self scheduling link. .REFILLAPPT)  Scheduling request sent?     [] Yes  [x] No    Does patient need updated?  [] Yes  [x] No

## 2025-04-01 RX ORDER — ROSUVASTATIN CALCIUM 5 MG/1
5 TABLET, COATED ORAL DAILY
Qty: 90 TABLET | Refills: 3 | Status: SHIPPED | OUTPATIENT
Start: 2025-04-01

## 2025-04-01 RX ORDER — METOPROLOL TARTRATE 25 MG/1
12.5 TABLET, FILM COATED ORAL 2 TIMES DAILY
Qty: 90 TABLET | Refills: 3 | Status: SHIPPED | OUTPATIENT
Start: 2025-04-01

## 2025-04-07 NOTE — TELEPHONE ENCOUNTER
Name of Medication(s) Requested:  Requested Prescriptions     Pending Prescriptions Disp Refills    ketoconazole (NIZORAL) 2 % shampoo 120 mL 3     Sig: Apply topically daily as needed.       Medication is on current medication list Yes    Dosage and directions were verified? Yes    Quantity verified: 90 day supply     Pharmacy Verified?  Yes    Last Appointment:  1/28/2025    Future appts:  Future Appointments   Date Time Provider Department Center   4/9/2025  1:30 [facility]   4/23/2025  2:40 PM Jonathan Reeves APRN - CNS BD ENDO [facility]        (If no appt send self scheduling link. .REFILLAPPT)  Scheduling request sent?     [] Yes  [x] No    Does patient need updated?  [] Yes  [x] No

## 2025-04-08 RX ORDER — KETOCONAZOLE 20 MG/ML
SHAMPOO, SUSPENSION TOPICAL
Qty: 120 ML | Refills: 3 | Status: SHIPPED | OUTPATIENT
Start: 2025-04-08

## 2025-04-09 PROBLEM — Z01.812 PRE-OPERATIVE LABORATORY EXAMINATION: Status: RESOLVED | Noted: 2025-03-10 | Resolved: 2025-04-09

## 2025-04-25 ENCOUNTER — TELEPHONE (OUTPATIENT)
Dept: SURGERY | Age: 55
End: 2025-04-25

## 2025-04-25 NOTE — TELEPHONE ENCOUNTER
Pt called in to cancel her post op appt for today, she said she has been sick for a month and she doesn't wish to r/s.

## 2025-05-02 DIAGNOSIS — R25.2 MUSCLE CRAMP, NOCTURNAL: ICD-10-CM

## 2025-05-02 DIAGNOSIS — I70.223 REST PAIN OF BOTH LOWER EXTREMITIES DUE TO ATHEROSCLEROSIS (HCC): Primary | ICD-10-CM

## 2025-05-02 RX ORDER — PREGABALIN 25 MG/1
25 CAPSULE ORAL 2 TIMES DAILY
Qty: 60 CAPSULE | Refills: 0 | Status: SHIPPED | OUTPATIENT
Start: 2025-05-02 | End: 2025-06-01

## 2025-06-03 ENCOUNTER — PATIENT MESSAGE (OUTPATIENT)
Dept: ENDOCRINOLOGY | Age: 55
End: 2025-06-03

## 2025-06-03 DIAGNOSIS — T38.0X5A STEROID-INDUCED DIABETES MELLITUS, INITIAL ENCOUNTER: ICD-10-CM

## 2025-06-03 DIAGNOSIS — E09.9 STEROID-INDUCED DIABETES MELLITUS, INITIAL ENCOUNTER: ICD-10-CM

## 2025-06-03 RX ORDER — SEMAGLUTIDE 2.68 MG/ML
INJECTION, SOLUTION SUBCUTANEOUS
Qty: 3 ML | Refills: 5 | Status: SHIPPED | OUTPATIENT
Start: 2025-06-03

## 2025-06-03 RX ORDER — BLOOD SUGAR DIAGNOSTIC
1 STRIP MISCELLANEOUS 2 TIMES DAILY
Qty: 200 EACH | Refills: 3 | Status: SHIPPED | OUTPATIENT
Start: 2025-06-03

## 2025-06-04 DIAGNOSIS — I10 ESSENTIAL HYPERTENSION: Chronic | ICD-10-CM

## 2025-06-04 NOTE — TELEPHONE ENCOUNTER
Name of Medication(s) Requested:  Requested Prescriptions     Pending Prescriptions Disp Refills    lisinopril (PRINIVIL;ZESTRIL) 30 MG tablet 90 tablet 1     Sig: Take 1 tablet by mouth daily       Medication is on current medication list Yes    Dosage and directions were verified? Yes    Quantity verified: 90 day supply     Pharmacy Verified?  Yes    Last Appointment:  1/28/2025    Future appts:  Future Appointments   Date Time Provider Department Center   6/13/2025  9:00 AM [facility]   10/7/2025  2:20 PM [facility]        (If no appt send self scheduling link. .REFILLAPPT)  Scheduling request sent?     [] Yes  [] No    Does patient need updated?  [] Yes  [] No

## 2025-06-05 RX ORDER — LISINOPRIL 30 MG/1
30 TABLET ORAL DAILY
Qty: 90 TABLET | Refills: 1 | Status: SHIPPED | OUTPATIENT
Start: 2025-06-05 | End: 2025-12-02

## 2025-06-09 ENCOUNTER — PATIENT MESSAGE (OUTPATIENT)
Dept: ENDOCRINOLOGY | Age: 55
End: 2025-06-09

## 2025-06-13 ENCOUNTER — OFFICE VISIT (OUTPATIENT)
Age: 55
End: 2025-06-13
Payer: COMMERCIAL

## 2025-06-13 VITALS
HEART RATE: 104 BPM | TEMPERATURE: 96.7 F | DIASTOLIC BLOOD PRESSURE: 75 MMHG | OXYGEN SATURATION: 100 % | RESPIRATION RATE: 17 BRPM | SYSTOLIC BLOOD PRESSURE: 127 MMHG

## 2025-06-13 DIAGNOSIS — J43.2 CENTRILOBULAR EMPHYSEMA (HCC): ICD-10-CM

## 2025-06-13 DIAGNOSIS — I50.22 CHRONIC SYSTOLIC CONGESTIVE HEART FAILURE (HCC): Primary | ICD-10-CM

## 2025-06-13 PROCEDURE — 99205 OFFICE O/P NEW HI 60 MIN: CPT | Performed by: INTERNAL MEDICINE

## 2025-06-13 PROCEDURE — 3074F SYST BP LT 130 MM HG: CPT | Performed by: INTERNAL MEDICINE

## 2025-06-13 PROCEDURE — 3078F DIAST BP <80 MM HG: CPT | Performed by: INTERNAL MEDICINE

## 2025-06-13 NOTE — PROGRESS NOTES
New pt for Valve evaluation today to see Dr Linares. Plan for some additional testing to be done to evaluate results and will follow up in office after pt has testing completed. Pt to schedule Echo and given info sheet to call and arrange. Lab testing to be done and office to schedule PFT and Chest CT Airway. Will referral to Pulm Rehab and mail out testing appt letter and office appt card later.    
TAKE ONE TABLET BY MOUTH THREE TIMES A DAY AS NEEDED FOR PAIN      aspirin 81 MG EC tablet Take 1 tablet by mouth daily       No current facility-administered medications for this visit.     Social History       Tobacco History       Smoking Status  Former Smoking Start Date  11/25/1981 Quit Date  11/25/2021 Average Packs/Day  0.5 packs/day for 40.0 years (20.0 ttl pk-yrs) Smoking Tobacco Type  Cigarettes from 11/25/1981 to 11/25/2021   Pack Year History     Packs/Day From To Years    0 11/25/2021  3.5    0.5 11/25/1981 11/25/2021 40.0      Smokeless Tobacco Use  Never              Alcohol History       Alcohol Use Status  Yes Drinks/Week  0 Glasses of wine, 0 Cans of beer, 0 Shots of liquor, 0 Drinks containing 0.5 oz of alcohol per week Comment  socially              Drug Use       Drug Use Status  No              Sexual Activity       Sexually Active  Yes Partners  Male                     Family History   Problem Relation Age of Onset    Cancer Mother 44        Non-Hodgkin Lymphoma    Diabetes Sister     High Blood Pressure Sister     Arthritis Sister         Gout    Arthritis Father     Psoriasis Father     Diabetes Father     High Blood Pressure Father     Kidney Disease Father     Heart Disease Father     High Blood Pressure Sister           Review of Systems    Review of Systems   General- No headaches , dizzinesss, syncope   Pulmonary - No chest pain , hemoptysis   Cardiovascular- No chest pain,   GI- No abd pain ,No difficulty swallowing, diarrhea , no vomiting   Musculoskeletal- No extremity weakness, no edema , no cyanosis   Genitourinary- No burning urination, no dysuria, no incontinence  Neuro- NO syncope , AMS  Or weakness , No tingling , no numbness.   Skin- No rashes , no cyanosis.   Psychiatric/Behavioral: Negative for confusion, hallucinations and sleep disturbance. The patient is not nervous/anxious.       Physical Exam    There were no vitals filed for this visit.       General appearance: Not

## 2025-06-17 ENCOUNTER — TELEPHONE (OUTPATIENT)
Dept: CARDIAC REHAB | Age: 55
End: 2025-06-17

## 2025-06-17 NOTE — TELEPHONE ENCOUNTER
Called patient to see if she was interested in attending pulm rehab.  Pt. Stated that she is but called her insurance and they stated it would not be covered.  She is going to check back with her insurance and get back to us.     Postop Diagnosis: same

## 2025-07-18 ENCOUNTER — HOSPITAL ENCOUNTER (OUTPATIENT)
Dept: CT IMAGING | Age: 55
Discharge: HOME | End: 2025-07-20
Attending: INTERNAL MEDICINE
Payer: COMMERCIAL

## 2025-07-18 ENCOUNTER — HOSPITAL ENCOUNTER (OUTPATIENT)
Dept: PULMONOLOGY | Age: 55
End: 2025-07-18
Attending: INTERNAL MEDICINE
Payer: COMMERCIAL

## 2025-07-18 DIAGNOSIS — J43.2 CENTRILOBULAR EMPHYSEMA (HCC): ICD-10-CM

## 2025-07-18 PROCEDURE — 71250 CT THORAX DX C-: CPT

## 2025-08-06 ENCOUNTER — HOSPITAL ENCOUNTER (OUTPATIENT)
Dept: CARDIOLOGY | Age: 55
Discharge: HOME | End: 2025-08-08
Attending: INTERNAL MEDICINE
Payer: COMMERCIAL

## 2025-08-06 VITALS
SYSTOLIC BLOOD PRESSURE: 127 MMHG | WEIGHT: 175 LBS | HEIGHT: 60 IN | DIASTOLIC BLOOD PRESSURE: 75 MMHG | BODY MASS INDEX: 34.36 KG/M2

## 2025-08-06 DIAGNOSIS — I50.22 CHRONIC SYSTOLIC CONGESTIVE HEART FAILURE (HCC): ICD-10-CM

## 2025-08-06 PROCEDURE — 93306 TTE W/DOPPLER COMPLETE: CPT

## 2025-08-07 LAB
ECHO AO ASC DIAM: 2.7 CM
ECHO AO ASCENDING AORTA INDEX: 1.53 CM/M2
ECHO AV AREA PEAK VELOCITY: 2.8 CM2
ECHO AV AREA VTI: 2.9 CM2
ECHO AV AREA/BSA PEAK VELOCITY: 1.6 CM2/M2
ECHO AV AREA/BSA VTI: 1.6 CM2/M2
ECHO AV CUSP MM: 1.6 CM
ECHO AV MEAN GRADIENT: 4 MMHG
ECHO AV MEAN VELOCITY: 0.9 M/S
ECHO AV PEAK GRADIENT: 8 MMHG
ECHO AV PEAK VELOCITY: 1.4 M/S
ECHO AV VELOCITY RATIO: 0.79
ECHO AV VTI: 22.5 CM
ECHO BSA: 1.83 M2
ECHO EST RA PRESSURE: 3 MMHG
ECHO LA DIAMETER INDEX: 1.76 CM/M2
ECHO LA DIAMETER: 3.1 CM
ECHO LA VOL A-L A2C: 29 ML (ref 22–52)
ECHO LA VOL A-L A4C: 34 ML (ref 22–52)
ECHO LA VOL MOD A2C: 27 ML (ref 22–52)
ECHO LA VOL MOD A4C: 31 ML (ref 22–52)
ECHO LA VOLUME AREA LENGTH: 32 ML
ECHO LA VOLUME INDEX A-L A2C: 16 ML/M2 (ref 16–34)
ECHO LA VOLUME INDEX A-L A4C: 19 ML/M2 (ref 16–34)
ECHO LA VOLUME INDEX AREA LENGTH: 18 ML/M2 (ref 16–34)
ECHO LA VOLUME INDEX MOD A2C: 15 ML/M2 (ref 16–34)
ECHO LA VOLUME INDEX MOD A4C: 18 ML/M2 (ref 16–34)
ECHO LV EDV A2C: 66 ML
ECHO LV EDV A4C: 70 ML
ECHO LV EDV BP: 69 ML (ref 56–104)
ECHO LV EDV INDEX A4C: 40 ML/M2
ECHO LV EDV INDEX BP: 39 ML/M2
ECHO LV EDV NDEX A2C: 38 ML/M2
ECHO LV EF PHYSICIAN: 50 %
ECHO LV EJECTION FRACTION A2C: 47 %
ECHO LV EJECTION FRACTION A4C: 48 %
ECHO LV EJECTION FRACTION BIPLANE: 46 % (ref 55–100)
ECHO LV ESV A2C: 35 ML
ECHO LV ESV A4C: 37 ML
ECHO LV ESV BP: 37 ML (ref 19–49)
ECHO LV ESV INDEX A2C: 20 ML/M2
ECHO LV ESV INDEX A4C: 21 ML/M2
ECHO LV ESV INDEX BP: 21 ML/M2
ECHO LV FRACTIONAL SHORTENING: 27 % (ref 28–44)
ECHO LV INTERNAL DIMENSION DIASTOLE INDEX: 3.13 CM/M2
ECHO LV INTERNAL DIMENSION DIASTOLIC: 5.5 CM (ref 3.9–5.3)
ECHO LV INTERNAL DIMENSION SYSTOLIC INDEX: 2.27 CM/M2
ECHO LV INTERNAL DIMENSION SYSTOLIC: 4 CM
ECHO LV IVSD: 1 CM (ref 0.6–0.9)
ECHO LV MASS 2D: 213.2 G (ref 67–162)
ECHO LV MASS INDEX 2D: 121.1 G/M2 (ref 43–95)
ECHO LV POSTERIOR WALL DIASTOLIC: 1 CM (ref 0.6–0.9)
ECHO LV RELATIVE WALL THICKNESS RATIO: 0.36
ECHO LVOT AREA: 3.8 CM2
ECHO LVOT AV VTI INDEX: 0.76
ECHO LVOT DIAM: 2.2 CM
ECHO LVOT MEAN GRADIENT: 2 MMHG
ECHO LVOT PEAK GRADIENT: 4 MMHG
ECHO LVOT PEAK VELOCITY: 1.1 M/S
ECHO LVOT STROKE VOLUME INDEX: 37.1 ML/M2
ECHO LVOT SV: 65.3 ML
ECHO LVOT VTI: 17.2 CM
ECHO MV A VELOCITY: 0.84 M/S
ECHO MV AREA PHT: 3.5 CM2
ECHO MV AREA VTI: 3.3 CM2
ECHO MV E DECELERATION TIME (DT): 223.5 MS
ECHO MV E VELOCITY: 0.56 M/S
ECHO MV E/A RATIO: 0.67
ECHO MV LVOT VTI INDEX: 1.16
ECHO MV MAX VELOCITY: 1 M/S
ECHO MV MEAN GRADIENT: 1 MMHG
ECHO MV MEAN VELOCITY: 0.6 M/S
ECHO MV PEAK GRADIENT: 4 MMHG
ECHO MV PRESSURE HALF TIME (PHT): 63.2 MS
ECHO MV VTI: 19.9 CM
ECHO PV MAX VELOCITY: 0.9 M/S
ECHO PV MEAN GRADIENT: 1 MMHG
ECHO PV MEAN VELOCITY: 0.5 M/S
ECHO PV PEAK GRADIENT: 3 MMHG
ECHO PV VTI: 14.6 CM
ECHO RV INTERNAL DIMENSION: 2.1 CM

## 2025-08-07 PROCEDURE — 93306 TTE W/DOPPLER COMPLETE: CPT | Performed by: INTERNAL MEDICINE

## 2025-08-12 ENCOUNTER — TELEPHONE (OUTPATIENT)
Dept: CARDIAC REHAB | Age: 55
End: 2025-08-12

## 2025-08-21 ENCOUNTER — HOSPITAL ENCOUNTER (OUTPATIENT)
Dept: PULMONOLOGY | Age: 55
Discharge: HOME | End: 2025-08-21
Attending: INTERNAL MEDICINE
Payer: COMMERCIAL

## 2025-08-21 ENCOUNTER — CLINICAL DOCUMENTATION (OUTPATIENT)
Dept: PULMONOLOGY | Age: 55
End: 2025-08-21

## 2025-08-21 VITALS — WEIGHT: 162 LBS | BODY MASS INDEX: 31.8 KG/M2 | HEIGHT: 60 IN

## 2025-08-21 DIAGNOSIS — R06.02 SHORTNESS OF BREATH: Primary | ICD-10-CM

## 2025-08-21 PROCEDURE — 94618 PULMONARY STRESS TESTING: CPT

## 2025-08-21 ASSESSMENT — 6 MINUTE WALK TEST (6MWT)
BORG FATIGUE SCALE SCORE: SLIGHT (LIGHT)
ADDTIONAL O2 FLOW RATE (L/MIN): YES
O2 SATURATION 4: 98
% PREDICTED: 55.33
BORG DYSPNEA SCALE SCORE: SLIGHT (LIGHT)
O2 SATURATION 2: 98
O2 SATURATION: 100
O2 FLOW RATE 3 (L/MIN): 2
BORG DYSPNEA SCALE SCORE: SOMEWHAT SEVERE
HEART RATE: 110
O2 SATURATION 5: 99
HEART RATE: 114
BORG FATIGUE SCALE SCORE: SLIGHT (LIGHT)
HEART RATE: 116
O2 SATURATION 3: 98
BLOOD PRESSURE: 155/90
TOTAL DISTANCE WALKED (M): 275
BORG DYSPNEA SCALE SCORE: MODERATE
BLOOD PRESSURE 1: 138/92
O2 FLOW RATE (L/MIN): 2
DID PATIENT STOP OR PAUSE BEFORE 6 MINUTES?: NO
O2 FLOW RATE 2 (L/MIN): 2
OXYGEN DEVICE: NASAL CANNULA
O2 SATURATION: 99
BORG FATIGUE SCALE SCORE: VERY, VERY SLIGHT (JUST NOTICEABLE)
O2 SATURATION: 99
O2 FLOW RATE 4 (L/MIN): 2
SYMPTOMS: SOB
O2 FLOW RATE (L/MIN): 2
O2 FLOW RATE 5 (L/MIN): 2

## 2025-08-28 ENCOUNTER — TELEPHONE (OUTPATIENT)
Dept: CARDIAC REHAB | Age: 55
End: 2025-08-28

## (undated) DEVICE — TRAY SYSTEM 7 DRILL REUSABLE

## (undated) DEVICE — DRAPE,REIN 53X77,STERILE: Brand: MEDLINE

## (undated) DEVICE — INSTRUMENT ANGLED CURRETTES REUSABLE

## (undated) DEVICE — SOLUTION IV 500ML 0.9% SOD CHL PH 5 INJ USP VIAFLX PLAS

## (undated) DEVICE — 3M™ IOBAN™ 2 ANTIMICROBIAL INCISE DRAPE 6651EZ: Brand: IOBAN™ 2

## (undated) DEVICE — TRAY TRIATHLON SIZE 3-6CR FEM/TIB TRIAL REUSABLE

## (undated) DEVICE — TRAY STRYKER MAKO LEG POSITIONER INSTR

## (undated) DEVICE — GOWN,SIRUS,FABRNF,XL,20/CS: Brand: MEDLINE

## (undated) DEVICE — ADHESIVE SKIN CLSR 0.7ML TOP DERMBND ADV

## (undated) DEVICE — SET ORTHO STD STORTSTD1

## (undated) DEVICE — TRAY TRIATLON PATELLA PREP AND TRIAL REUSABLE

## (undated) DEVICE — GLOVE SURG 7.5 PF POLYMER WHT STRL SIGN LTX ESSENTIAL LTX

## (undated) DEVICE — SUTURE STRATAFIX SYMMETRIC SZ 1 L18IN ABSRB VLT CT1 L36CM SXPP1A404

## (undated) DEVICE — DOUBLE BASIN SET: Brand: MEDLINE INDUSTRIES, INC.

## (undated) DEVICE — TRAY LAMBOTS REUSABLE

## (undated) DEVICE — TUBE IRRIG HNDPC HI FLO TP INTRPULS W/SUCTION TUBE

## (undated) DEVICE — INSTRUMENT SYSTEM 7 BATTERY REUSABLE

## (undated) DEVICE — COVER HNDL LT DISP

## (undated) DEVICE — BANDAGE COMPR W6INXL12FT SMOOTH FOR LIMB EXSANG ESMARCH

## (undated) DEVICE — ELEVATOR COBBS

## (undated) DEVICE — DECANTER: Brand: UNBRANDED

## (undated) DEVICE — KIT DRP FOR RIO ROBOTIC ARM ASST SYS

## (undated) DEVICE — GLOVE SURG SZ 8 L12IN FNGR THK94MIL STD WHT LTX FREE

## (undated) DEVICE — TRAY TRIATHLON SIZE 3-6FEM/TIB PREP REUSABLE

## (undated) DEVICE — SPONGE LAP W18XL18IN WHT COT 4 PLY FLD STRUNG RADPQ DISP ST

## (undated) DEVICE — 4-PORT MANIFOLD: Brand: NEPTUNE 2

## (undated) DEVICE — TRAY STRYKER MAKO TOTAL KNEE POWER

## (undated) DEVICE — ELECTRODE PT RET AD L9FT HI MOIST COND ADH HYDRGEL CORDED

## (undated) DEVICE — Z DISCONTINUED PER MEDLINE USE 2741943 DRESSING AQUACEL 10 IN ALG W9XL25CM SIL CVR WTRPRF VIR BACT BARR ANTIMIC

## (undated) DEVICE — Z DISCONTINUED USE 2275686 GLOVE SURG SZ 8 L12IN FNGR THK13MIL WHT ISOLEX POLYISOPRENE

## (undated) DEVICE — SYRINGE MED 30ML STD CLR PLAS LUERLOCK TIP N CTRL DISP

## (undated) DEVICE — INSTRUMENT GRADUATE REUSABLE

## (undated) DEVICE — SOLUTION SURG PREP 26 CC PURPREP

## (undated) DEVICE — TOTAL KNEE PK

## (undated) DEVICE — PIN BNE FIX TEMP L140MM DIA4MM MAKO

## (undated) DEVICE — 3M™ COBAN™ NL STERILE NON-LATEX SELF-ADHERENT WRAP, 2084S, 4 IN X 5 YD (10 CM X 4,5 M), 18 ROLLS/CASE: Brand: 3M™ COBAN™

## (undated) DEVICE — TUBING, SUCTION, 9/32" X 10', STRAIGHT: Brand: MEDLINE

## (undated) DEVICE — TRAY STRYKER MAKO TOTAL KNEE ARRAY

## (undated) DEVICE — TOWEL,OR,DSP,ST,BLUE,STD,6/PK,12PK/CS: Brand: MEDLINE

## (undated) DEVICE — Device

## (undated) DEVICE — BNDG,ELSTC,MATRIX,STRL,6"X5YD,LF,HOOK&LP: Brand: MEDLINE

## (undated) DEVICE — ZIMMER® STERILE DISPOSABLE TOURNIQUET CUFF WITH PLC, DUAL PORT, SINGLE BLADDER, 30 IN. (76 CM)

## (undated) DEVICE — SOLUTION IV IRRIG WATER 1000ML POUR BRL 2F7114

## (undated) DEVICE — BOOT POS LEG DEMAYO

## (undated) DEVICE — BASIC SINGLE BASIN 1-LF: Brand: MEDLINE INDUSTRIES, INC.

## (undated) DEVICE — GLOVE SURG SZ 8 CRM LTX FREE POLYISOPRENE POLYMER BEAD ANTI

## (undated) DEVICE — 3M™ STERI-DRAPE™ U-DRAPE 1015: Brand: STERI-DRAPE™

## (undated) DEVICE — STERILE PVP: Brand: MEDLINE INDUSTRIES, INC.

## (undated) DEVICE — GOWN,SIRUS,NONRNF,SETINSLV,XL,20/CS: Brand: MEDLINE

## (undated) DEVICE — TAPE ADH W3INXL10YD WHT COT WVN BK POWERFUL RUB BASE HIGHLY

## (undated) DEVICE — DRAPE,TOP,102X53,STERILE: Brand: MEDLINE

## (undated) DEVICE — DRESSING FOAM ADH POLYUR W/ SIL WND SHT 4IN LEN 4IN W

## (undated) DEVICE — STRYKER PERFORMANCE SERIES SAGITTAL BLADE: Brand: STRYKER PERFORMANCE SERIES

## (undated) DEVICE — PIN BNE FIX TEMP L110MM DIA4MM MAKO

## (undated) DEVICE — TRAY TRIATHLON MISC INST REUSABLE

## (undated) DEVICE — Z INACTIVE USE 2660664 SOLUTION IRRIG 3000ML 0.9% SOD CHL USP UROMATIC PLAS CONT

## (undated) DEVICE — CORD RETRCT SIL

## (undated) DEVICE — PACK PROCEDURE SURG GEN CUST

## (undated) DEVICE — SOLUTION IV IRRIG POUR BRL 0.9% SODIUM CHL 2F7124

## (undated) DEVICE — STANDARD HYPODERMIC NEEDLE,POLYPROPYLENE HUB: Brand: MONOJECT

## (undated) DEVICE — KIT TRK KNEE PROC VIZADISC

## (undated) DEVICE — KIT INT FIX FEM TIB CKPT MAKOPLASTY